# Patient Record
Sex: FEMALE | Race: BLACK OR AFRICAN AMERICAN | NOT HISPANIC OR LATINO | Employment: OTHER | URBAN - METROPOLITAN AREA
[De-identification: names, ages, dates, MRNs, and addresses within clinical notes are randomized per-mention and may not be internally consistent; named-entity substitution may affect disease eponyms.]

---

## 2017-01-05 ENCOUNTER — ALLSCRIPTS OFFICE VISIT (OUTPATIENT)
Dept: OTHER | Facility: OTHER | Age: 57
End: 2017-01-05

## 2017-02-24 ENCOUNTER — ALLSCRIPTS OFFICE VISIT (OUTPATIENT)
Dept: OTHER | Facility: OTHER | Age: 57
End: 2017-02-24

## 2017-03-01 ENCOUNTER — GENERIC CONVERSION - ENCOUNTER (OUTPATIENT)
Dept: OTHER | Facility: OTHER | Age: 57
End: 2017-03-01

## 2017-03-15 ENCOUNTER — GENERIC CONVERSION - ENCOUNTER (OUTPATIENT)
Dept: OTHER | Facility: OTHER | Age: 57
End: 2017-03-15

## 2017-03-29 ENCOUNTER — ALLSCRIPTS OFFICE VISIT (OUTPATIENT)
Dept: OTHER | Facility: OTHER | Age: 57
End: 2017-03-29

## 2017-04-10 ENCOUNTER — ALLSCRIPTS OFFICE VISIT (OUTPATIENT)
Dept: OTHER | Facility: OTHER | Age: 57
End: 2017-04-10

## 2017-04-12 ENCOUNTER — GENERIC CONVERSION - ENCOUNTER (OUTPATIENT)
Dept: OTHER | Facility: OTHER | Age: 57
End: 2017-04-12

## 2017-04-12 ENCOUNTER — ALLSCRIPTS OFFICE VISIT (OUTPATIENT)
Dept: OTHER | Facility: OTHER | Age: 57
End: 2017-04-12

## 2017-05-10 ENCOUNTER — ALLSCRIPTS OFFICE VISIT (OUTPATIENT)
Dept: OTHER | Facility: OTHER | Age: 57
End: 2017-05-10

## 2017-05-17 ENCOUNTER — ALLSCRIPTS OFFICE VISIT (OUTPATIENT)
Dept: OTHER | Facility: OTHER | Age: 57
End: 2017-05-17

## 2017-06-01 ENCOUNTER — GENERIC CONVERSION - ENCOUNTER (OUTPATIENT)
Dept: OTHER | Facility: OTHER | Age: 57
End: 2017-06-01

## 2017-06-14 ENCOUNTER — ALLSCRIPTS OFFICE VISIT (OUTPATIENT)
Dept: OTHER | Facility: OTHER | Age: 57
End: 2017-06-14

## 2017-06-28 ENCOUNTER — ALLSCRIPTS OFFICE VISIT (OUTPATIENT)
Dept: OTHER | Facility: OTHER | Age: 57
End: 2017-06-28

## 2017-08-03 ENCOUNTER — ALLSCRIPTS OFFICE VISIT (OUTPATIENT)
Dept: OTHER | Facility: OTHER | Age: 57
End: 2017-08-03

## 2017-08-17 ENCOUNTER — ALLSCRIPTS OFFICE VISIT (OUTPATIENT)
Dept: OTHER | Facility: OTHER | Age: 57
End: 2017-08-17

## 2017-08-30 ENCOUNTER — ALLSCRIPTS OFFICE VISIT (OUTPATIENT)
Dept: OTHER | Facility: OTHER | Age: 57
End: 2017-08-30

## 2017-08-31 ENCOUNTER — ALLSCRIPTS OFFICE VISIT (OUTPATIENT)
Dept: OTHER | Facility: OTHER | Age: 57
End: 2017-08-31

## 2017-10-19 ENCOUNTER — ALLSCRIPTS OFFICE VISIT (OUTPATIENT)
Dept: OTHER | Facility: OTHER | Age: 57
End: 2017-10-19

## 2017-11-01 ENCOUNTER — GENERIC CONVERSION - ENCOUNTER (OUTPATIENT)
Dept: OTHER | Facility: OTHER | Age: 57
End: 2017-11-01

## 2017-11-15 ENCOUNTER — ALLSCRIPTS OFFICE VISIT (OUTPATIENT)
Dept: OTHER | Facility: OTHER | Age: 57
End: 2017-11-15

## 2017-11-17 ENCOUNTER — ALLSCRIPTS OFFICE VISIT (OUTPATIENT)
Dept: OTHER | Facility: OTHER | Age: 57
End: 2017-11-17

## 2017-11-29 ENCOUNTER — ALLSCRIPTS OFFICE VISIT (OUTPATIENT)
Dept: OTHER | Facility: OTHER | Age: 57
End: 2017-11-29

## 2018-01-09 NOTE — PSYCH
History of Present Illness  [facility] Progress Note [facility]:   Specialized Services Documentation - Therapist must complete separate progress note for each specific clinical activity in which the client participated during the day  (230) Group Psychotherapy: ([id number]) Antolin Bay participated only during the introductory phase of psychotherapy group focused on recovery  She sat quietly with poor eye contact and head bowed  No progress towards goal  Continue group to offer opportunity to relate to peers around similar issues  Treatment Plan Problem(s): 1 1  Latoya Llanes LCSW     (318) Group Psychotherapy: [id number] Antolin Bay participated minimally in wellness group focused on assessment of weekly goal work in each area of functioning; including physical, emotional, cognitive, social and spiritual  Pt sat slumped down in chair with hat on and had poor eye contact with peers  Antolin Bay briefly shared that she will work on emotional area of functioning next week but did not want to discuss objectives to this goal  Pt made minimal progress toward goal  Continue group to educate and encourage patient to identify areas of functioning requiring ongoing attention for healthier functioning  Treatment Plan Problem(s): 1 1  Prisca Romero RN       (535) Education Therapy Goals set - exercise    Treatment Plan Problem(s): 1 1  Education Therapy Time - 0900 - 0930, Time first treatment day Previous goal was met  Readiness to Learning:  She is receptive to learning  There are  no barriers to learning  Learning Assessment Time - 1330 - 1400   Education completed on  illness, medication and wellness tools  The teaching method was  verbal and written  Shared area of learning: Yes  ALEYDA Grace     (163) Allied Therapy [id number] was excused related to case management evaluation   ALEYDA Grace     ( ) Other Case Management/UR Per Tee Joseph of shun VALENTIN, 12 PHP days approved with LCD 5/2/16  [facility]  # [phone]  Reviewer: Gray Ashley [phone] Anival Meza  [id number]  Treatment Plan Problem(s): N/A  Alberto Robles LCSW           Active Problems     1  Abnormal CBC (790 6) (R79 89)   2  Allergic rhinitis (477 9) (J30 9)   3  Asthma (493 90) (J45 909)   4  Asymptomatic menopausal state (V49 81) (Z78 0)   5  Breast cancer screening (V76 10) (Z12 39)   6  Bronchospasm (519 11) (J98 01)   7  Eczema (692 9) (L30 9)   8  Encounter for screening mammogram for malignant neoplasm of breast (V76 12)   (Z12 31)   9  Fatigue (780 79) (R53 83)   10  History of allergy (V15 09) (Z88 9)   11  Hyperlipidemia (272 4) (E78 5)   12  Laboratory examination ordered as part of a routine general medical examination    (V72 62) (Z00 00)   13  Major depressive disorder, recurrent, moderate (296 32) (F33 1)   14  Need for hepatitis B vaccination (V05 3) (Z23)   15  Need for prophylactic vaccination and inoculation against bacterial diseases (V03 9)    (Z23)   16  Need for prophylactic vaccination and inoculation against influenza (V04 81) (Z23)   17  Positive PPD (795 51) (R76 11)   18  Post-menopausal (V49 81) (Z78 0)   19  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)   20  Screening examination for pulmonary tuberculosis (V74 1) (Z11 1)   21  Vitamin D deficiency (268 9) (E55 9)    Severe recurrent major depression without psychotic features (296 33) (F33 2)       JOSE ROBERTO (generalized anxiety disorder) (300 02) (F41 1)          Past Medical History    1  History of A Fall (E888 9)   2  History of Acute upper respiratory infection (465 9) (J06 9)   3  History of Atelectasis (518 0) (J98 11)   4  History of Contusion Of The Chest Wall With Intact Skin Surface (922 1)   5  History of acute bronchitis (V12 69) (Z87 09)   6  History of acute bronchitis (V12 69) (Z87 09)   7  History of fatigue (V13 89) (Z87 898)   8  Denied: History of head injury   9  History of low back pain (V13 59) (Z87 39)   10   History of low back pain (V13 59) (Z87 39)   11  History of shortness of breath (V13 89) (Z87 898)   12  History of Joint pain, knee (719 46) (M25 569)   13  Need for hepatitis B vaccination (V05 3) (Z23)   14  Need for prophylactic vaccination and inoculation against bacterial diseases (V03 9)    (Z23)   15  Need for prophylactic vaccination and inoculation against influenza (V04 81) (Z23)   16  History of Palpitations (785 1) (R00 2)   17  History of 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)   18  Denied: History of Seizures   19  History of Subconjunctival hemorrhage, unspecified laterality (372 72) (H11 30)    Allergies    1  No Known Drug Allergies    Current Meds   1  Advair Diskus 250-50 MCG/DOSE Inhalation Aerosol Powder Breath Activated; 1 PUFF   TWICE DAILY; RINSE MOUTH AFTER USE AS NEEDED; Therapy: 43PIQ0008 to (Evaluate:54Mup6015)  Requested for: 21Apr2015; Last   Rx:21Apr2015 Ordered   2  ALPRAZolam 0 5 MG Oral Tablet; Take 1 tablet 3 times daily as needed; Therapy: 39ANI1615 to (Evaluate:59Bur4320)  Requested for: 12Apr2016; Last   Rx:02Mar2016; Status: ACTIVE - Renewal Denied Ordered   3  Calcium 1250 MG TABS; TAKE 1 TABLET DAILY; Therapy: 47VMJ3285 to Recorded   4  Fluticasone Propionate 50 MCG/ACT Nasal Suspension; USE 2 SPRAYS IN EACH   NOSTRIL ONCE DAILY; Therapy: 44HWT6475 to (Arlon Babinski)  Requested for: 81VXH5268; Last   Rx:12May2015 Ordered   5  Multi-Vitamins Oral Tablet; TAKE 1 TABLET DAILY; Therapy: 02OAZ9245 to Recorded   6  PARoxetine HCl - 30 MG Oral Tablet; TAKE 1 TABLET IN THE EVENING; Therapy: 51RCB0368 to (Evaluate:30Pwp3866)  Requested for: 88DTD1742; Last   Rx:02Mar2016 Ordered   7  ProAir  (90 Base) MCG/ACT Inhalation Aerosol Solution; INHALE 2 PUFFS   EVERY 4 HOURS AS NEEDED FOR COUGH AND WHEEZE;   Therapy: 85IUP3353 to (Evaluate:70Gcu3885)  Requested for: 21Apr2015; Last   Rx:21Apr2015 Ordered   8   Verapamil HCl  MG Oral Capsule Extended Release 24 Hour; TAKE 1 CAPSULE   Weekly; Therapy: 29Gqo4863 to (Evaluate:23Jun2015) Recorded   9  Vitamin C 1000 MG Oral Tablet; TAKE 1 TABLET DAILY; Therapy: 06DOY4417 to Recorded   10  Vitamin D3 2000 UNIT Oral Capsule; TAKE 1 CAPSULE Daily; Therapy: 63QTH9178 to (Last Rx:25Oct2013) Ordered    Family Psych History  Mother    1  No family history of mental disorder  Father    2  No family history of mental disorder  Grandmother    3  Family history of depression (V17 0) (Z81 8)  Family History    4  Family history of Hodgkin Disease   5  Family history of Hypothyroidism   6  Family history of Osteoporosis (V17 81)   7  Family history of Stroke Syndrome (V17 1)    Social History    · Being A Social Drinker   · Marital History - Currently    · Never A Smoker   · No drug use   · Denied: History of Tobacco Use   · Travel / Residence In [place]   · Denied: History of Using Intravenous Drugs   · Work History    Future Appointments    Date/Time Provider Specialty Site   04/18/2016 12:15 PM SHANTE Patel  Psychiatry [facility]   04/19/2016 11:45 AM SHANTE Patel  Psychiatry [facility]   04/20/2016 11:45 AM SHANTE Patel  [facility]iatry [facility]   04/21/2016 11:45 AM SHANTE Patel  [facility]iatry [facility]   04/22/2016 11:45 AM SHANTE Patel  [facility]iatry [facility]   06/29/2016 02:15 PM SHANTE Bowman  [facility]iatry [facility]   05/05/2016 11:15 AM Emily Cabrera MS, APRN, PMHCNS_BS  [facility]     Signatures   Electronically signed by : ALEYDA Che;  Apr 15 2016  2:18PM EST                       (Author)    Electronically signed by : Darlene Kam LCSW; Apr 15 2016  3:27PM EST                       (Author)    Electronically signed by : Veronica Chong RN; Apr 18 2016  3:08PM EST                       (Author)

## 2018-01-09 NOTE — PSYCH
Progress Note  Psychotherapy Provided [facility]: Individual Psychotherapy 45 minutes provided today  Goals addressed in session:   Goal #1  D: "It's a 'down day',and I can't seem to get organized   "   Pt Shelbi Branham) has a depressed, low-energy affect  Depressed mood  Low motivation, and she acknowledges that her home is 'disorganized ' Pt denies BRENNAN Samuels  PHQ9=14  Her sleep is in a hypersomnia-pattern---she sleeps "to escape " Appetite is good,and pt is trying to sit-on her eating and to lose a few lbs  Pt processes her stressors,and her thoughts, feelings,and behaviors  She is angry at herself for having such a clutttered/ disorganized house, with "paperwork everywhere", taxes not done (even from years ago), and she lost an important large envelope of paperwork to do, for Francesco's [address]---" I received it in the mail,and I don't know what I did with it " She has slight memory lapses, at times, with her depression  Pt also identifies an over-riding constant stressor---Francesco, 14, suffer from severe Autism,and he's in [employer] this year, but pt says , " I don't have any Plans together for him for when he DOES graduate High School, like where could he possibly go? What could he do?"    and "He's too big for me, to wash in the shower, and to teach him how to urinate properly without pulling his whole pants down at the urinal, when we go places with him---like when we were in [place] with him recently---it was so embarrassing when whole boat- crew went in the [facility] and saw him that way!" Pt does some Cognitive-reframing,and problem-solving, in session today  She will enlist the help of her  Jh Mcnally ,to teach Betty Arbela his toileting regimen and hand-washing afterward  She is advised to join the [employer] Group for parents of Autistic Children ,and to give political input to her ,as a Group, re: Autistic Care Residences for young adults, for post-High School years   (She's already been given the numbers for Support Group/ and National Association, in the past ) Pt to give self-affirmation, for her personal strengths, accomplishments in her life,and for keeping her family together  She has Francesco in [employer] past [employer], plus other Seasonal Sports with the [employer], at times  Active Listening,Support,and Validation given  Today ,her goal is that she will get a NEW packet from Francesco's school,and fill it out right away,and submit it  Then , organize her piles of paperwork at home, clear the decks---put important documents in a file cabinet---she hasn't done that yet  She did start Exercise,each week,and went this am--->affirmation given  She continues meds, no side effects except some weight  She is thankful for some Financial relief from [employer], a positive in her current life  A: Major Depressive Disorder, recurrent, severe, F33 2; Generalized Anxiety Disorder, F41 10  Stress re: being the parent of an adopted Severely Autistic Son,and total uncertainty about his future  P: Continue Treatment Plan, Meds, and Psychotherapy  Pt to organize herself,and get her paperwork either done, or filed, or discarded if appropriate! Pain Scale and Suicide Risk [facility]: Current Pain Assessment: moderate to severe   On a scale of 0 to 10, the patient rates current pain at 1   Current suicide risk is low   Behavioral Health Treatment Plan Jaylan Singh: Diagnosis and Treatment Plan explained to patient, patient relates understanding diagnosis and is agreeable to Treatment Plan  Results/Data  PHQ-9 Adult Depression Screening [id number] 10:56AM Jesus Bean     Test Name Result Flag Reference   PHQ-9 Adult Depression Score 14     Over the last two weeks, how often have you been bothered by any of the following problems?   Little interest or pleasure in doing things: Several days - 1  Feeling down, depressed, or hopeless: More than half the days - 2  Trouble falling or staying asleep, or sleeping too much: More than half the days - 2  Feeling tired or having little energy: Nearly every day - 3  Poor appetite or over eating: Several days - 1  Feeling bad about yourself - or that you are a failure or have let yourself or your family down: More than half the days - 2  Trouble concentrating on things, such as reading the newspaper or watching television: More than half the days - 2  Moving or speaking so slowly that other people could have noticed  Or the opposite -  being so fidgety or restless that you have been moving around a lot more than usual: Several days - 1  Thoughts that you would be better off dead, or of hurting yourself in some way: Not at all - 0   PHQ-9 Adult Depression Screening Positive     PHQ-9 Difficulty Level Very difficult     PHQ-9 Severity Moderate Depression         Assessment    1  Major depressive disorder, recurrent severe without psychotic features (296 33) (F33 2)   2  JOSE ROBERTO (generalized anxiety disorder) (300 02) (F41 1)   3   Marital conflict (F32 00) (M62 6)    Signatures   Electronically signed by : [facility] , MSAPRNPMHCNS-BC; Aug 31 2017 12:51PM EST                       (Author)    Electronically signed by : [facility] , MSAPRNPCNS-BC; Aug 31 2017 12:51PM EST                       (Author)

## 2018-01-10 NOTE — PSYCH
Treatment Plan Tracking    #1 Treatment Plan not completed within required time limits due to: Client presented with emotional/behavioral issues that required clinical intervention            Signatures   Electronically signed by : Junie Vásquez MSAPRNPMHCNARIADNE; Mar 29 2017  2:25PM EST                       (Author)

## 2018-01-10 NOTE — PSYCH
Progress Note  Psychotherapy Provided [facility]: Individual Psychotherapy 50 minutes provided today  Goals addressed in session:   Goal #1  D: " I have been very, very depressed this week  Elin King I'm worried    my 's On-Strike with his co-workers at [address] will only pay them $200 after striking a full 2 weeks---we will never get by on that " (Crying)   " I have almost no energy, no motivation, I didn't even do the things that our  needed  '   "I can't think straight",  " I feel hopeless "'   " I admit that yesterday, for the first time, I actually had suicidal thoughts and I wanted to take extra pills"    " I didn't take them , though"   " I did talk to Dr Paresh Reece, and I did talk to the Crisis worker that the [place] sent out,and I don't think I need to go to the hospital'"   " I'm taking care of Josué Petties, I had a meeting at his school yesterday    and we just deal with his Autism one day at a time    but THAT is so depressing! "  Elin King " I promise I will not hurt myself "   " I WILL go to [facility], as you are saying today " (Crying)    Pt has a depressed, hopeless mood  Pt has an anxious, low-energy , tearful affect,and poor eye-contact, until two-thirds of the session is over  Pt had SI yesterday,and [facility] interventions were done, as directed by Dr Paresh Reece  A Crisis Worker "Berna" ([facility]) also spoke with this Therapist this morning,and she confirmed that pt made a Verbal Safety Contract yesterday, when Patrice Pino spoke with pt at her home for an hour  Pt denies an active suicidal plan or intent today,and if the SI returns , pt is continuing her Verbal Safety Contract  Pt thinks of her ,and her autistic adopted son Francesco---who depends on pt a great deal, for functioning in this life   Pt is aware of emergency Resources,also,and she agrees to go to the nearest Emergency Room if she ever gets persistent SI---pt is keeping communications open with her  "Enrrique Cary", also  Pt processes the recent precipitating events---especially dire financial situation, as now the sole Provider in their family, Enrrique Cary, has been placed out On- Strike at [employer] they have been working without a contract since August of 2015,and pt feels that the workers are being "used" and hurt by [facility], working excessive overtime, having limited workers to do the expanding work, and pt worries about her 's health, too---"how long can he be under this stress, with no income from me?", pt states  Pt has limited energy and concentration and stamina in recent months,and she is not able to hold down a job outside the home at this time  Pt's energy she does muster, goes toward the care and educational /services coordination, of their adopted severely autistic son "Samuel Orozco", who is almost a teen  Pt is given listening, support,and validation as a person  This Therapist offers pt treatment at [facility], here at [facility]  Pt agrees to participate in the program,and I fill out the extensive Referral form---> Form is given to NAVEED Ballard,  for ALICE Garcia they do have openings  (A copy of the Referral to [facility] is also in the "Scanning" bin, to be entered in pt's electronic chart ) Dr Alycia Cleary is aware  Pt also expresses much worry and hopelessness re: the fact that her Social Security  Marian Madden  ) just announced his long-term   pt's claim was Denied already by Social Security,and pt was in the process of Appealing it  Today, pt signed another updated Release of Information,and we called Saud Adhikari's office: [phone]  The 's  stated that Bushra Rodriguez will go forward with pt's Appeal process,and his office will get back to pt,after his current Vacation plans  Pt thanks this Therapist for making the call to Saud Adhikari,and she has a smidgeon of Hope, at the end of session   We review calm, deep-breathing,and review some of her strengths and blessings-- pt regularly attends Religion---her Antonia Mock is important to her and to her &son  Pt continues her medications ,and will only take the doses of Paxil and Xanax as prescribed by Dr Vivien Lundborg, she states  A: Major Depressive Disorder, recurrent, severe, F33 2, no psychosis,and no current SI / plan today, but pt has decompensated; Generalized Anxiety Disorder, F41 1; Severe Financial Stress, and Family stressors while raising severely autistic son  P: Pt is referred to [facility],and she agrees to attend  Continue Verbal Safety Contract,and pt to use as positive of Coping strategies as possible  She also is aware of Emergency Resources if ever needed  Pt to resume Outpatient Psychotherapy with this Therapist after the [facility] program is completed  Pain Scale and Suicide Risk [facility]: Current Pain Assessment: moderate to severe   On a scale of 0 to 10, the patient rates current pain at 2   Current suicide risk is low   Behavioral Health Treatment Plan [place]: Diagnosis and Treatment Plan explained to patient, patient relates understanding diagnosis and is agreeable to Treatment Plan  Assessment    1  Severe recurrent major depression without psychotic features (296 33) (F33 2)   2  JOSE ROBERTO (generalized anxiety disorder) (300 02) (F41 1)    Signatures   Electronically signed by : ELISABET Jarrett; Apr 14 2016  9:27PM EST                       (Author)    Electronically signed by : ELISABET Jarrett;  Apr 14 2016  9:28PM EST                       (Author)

## 2018-01-10 NOTE — PSYCH
Psych Med Mgmt    Appearance: was calm and cooperative, adequate hygiene and grooming and good eye contact  Observed mood: depressed and anxious  Observed mood: affect was constricted  Speech: speech soft  Thought processes: coherent/organized  Hallucinations: no hallucinations present  Thought Content: no delusions  Abnormal Thoughts: The patient has no suicidal thoughts and no homicidal thoughts  Orientation: The patient is oriented to person, place and time  Recent and Remote Memory: short term memory intact and long term memory intact  Attention Span And Concentration: concentration impaired  Insight: Insight intact  Judgment: Her judgment was intact  Muscle Strength And Tone  Muscle strength and tone were normal  Normal gait and station  Language: no difficulty naming common objects  Fund of knowledge: Patient displays adequate knowledge of current events, adequate fund of knowledge regarding past history and adequate fund of knowledge regarding vocabulary  The patient is experiencing no localized pain  Treatment Recommendations: Continue Paxil 30 mg per day and switch to evening due to tiredness  Does not want dose increase  Continue Xanax 0 5 mg tid PRN  Therapy with Niyah Bertrand  Risks, Benefits And Possible Side Effects Of Medications: Risks, benefits, and possible side effects of medications explained to patient and patient verbalizes understanding  She reports normal appetite, decreased energy, no weight change and normal number of sleep hours  Patient states she still has been experiencing on and off anxiety on and off "every day is different"  Still some depression "it is about the same'  No suicidality or homicidality  No psychotic symptoms  Reports some tiredness, no other side effects from medications reported          Vitals  Signs [Data Includes: Current Encounter]   Recorded: [id number] 03:48PM   Heart Rate: 74  Systolic: 978  Diastolic: 82  BP Cuff Size: Large  Height: 5 ft 3 in  Weight: 166 lb   BMI Calculated: 29 41  BSA Calculated: 1 79    Assessment    1  JOSE ROBERTO (generalized anxiety disorder) (300 02) (F41 1)   2  Major depressive disorder, recurrent, moderate (296 32) (F33 1)    Plan    1  ALPRAZolam 0 5 MG Oral Tablet (Xanax); Take 1 tablet 3 times daily as needed    2  From  PARoxetine HCl - 30 MG Oral Tablet TAKE 1 TABLET DAILY To   PARoxetine HCl - 30 MG Oral Tablet TAKE 1 TABLET IN THE EVENING   3  Follow-up visit in 3 months Evaluation and Treatment  Follow-up  Status: Hold For -   Scheduling  Requested for: 96ZWS2319    Review of Systems    Constitutional: feeling tired  Cardiovascular: no complaints of slow or fast heart rate, no chest pain, no palpitations  Respiratory: no complaints of shortness of breath, no wheezing, no dyspnea on exertion  Gastrointestinal: no complaints of abdominal pain, no constipation, no nausea, no diarrhea, no vomiting  Genitourinary: no complaints of dysuria, no incontinence, no pelvic pain, no urinary frequency  Musculoskeletal: no complaints of arthralgia, no myalgias, no limb pain, no joint stiffness  Integumentary: no complaints of skin rash, no itching, no dry skin  Neurological: no complaints of headache, no confusion, no numbness, no dizziness  Other Symptoms: Nasal congestion  Past Psychiatric History    Past Psychiatric History: No prior history of inpatient psychiatric treatment  Attended Partial Program in past one time  No history of past suicide attempts  Active Problems    1  Abnormal CBC (790 6) (R79 89)   2  Allergic rhinitis (477 9) (J30 9)   3  Asthma (493 90) (J45 909)   4  Asymptomatic menopausal state (V49 81) (Z78 0)   5  Breast cancer screening (V76 10) (Z12 39)   6  Bronchospasm (519 11) (J98 01)   7  Eczema (692 9) (L30 9)   8  Encounter for screening mammogram for malignant neoplasm of breast (V76 12)   (Z12 31)   9  Fatigue (780 79) (R53 83)   10   JOSE ROBERTO (generalized anxiety disorder) (300 02) (F41 1)   11  History of allergy (V15 09) (Z88 9)   12  Hyperlipidemia (272 4) (E78 5)   13  Laboratory examination ordered as part of a routine general medical examination    (V72 62) (Z00 00)   14  Need for hepatitis B vaccination (V05 3) (Z23)   15  Need for prophylactic vaccination and inoculation against bacterial diseases (V03 9)    (Z23)   16  Need for prophylactic vaccination and inoculation against influenza (V04 81) (Z23)   17  Positive PPD (795 51) (R76 11)   18  Post-menopausal (V49 81) (Z78 0)   19  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)   20  Screening examination for pulmonary tuberculosis (V74 1) (Z11 1)   21  Severe recurrent major depression without psychotic features (296 33) (F33 2)   22  Vitamin D deficiency (268 9) (E55 9)    Past Medical History    1  History of A Fall (E888 9)   2  History of Acute upper respiratory infection (465 9) (J06 9)   3  History of Atelectasis (518 0) (J98 11)   4  History of Contusion Of The Chest Wall With Intact Skin Surface (922 1)   5  History of acute bronchitis (V12 69) (Z87 09)   6  History of acute bronchitis (V12 69) (Z87 09)   7  History of fatigue (V13 89) (Z87 898)   8  Denied: History of head injury   9  History of low back pain (V13 59) (Z87 39)   10  History of low back pain (V13 59) (Z87 39)   11  History of shortness of breath (V13 89) (Z87 898)   12  History of Joint pain, knee (719 46) (M25 569)   13  Need for hepatitis B vaccination (V05 3) (Z23)   14  Need for prophylactic vaccination and inoculation against bacterial diseases (V03 9)    (Z23)   15  Need for prophylactic vaccination and inoculation against influenza (V04 81) (Z23)   16  History of Palpitations (785 1) (R00 2)   17  History of 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)   18  Denied: History of Seizures   19   History of Subconjunctival hemorrhage, unspecified laterality (372 72) (H11 30)    The active problems and past medical history were reviewed and updated today  Allergies    1  No Known Drug Allergies    Current Meds   1  Advair Diskus 250-50 MCG/DOSE Inhalation Aerosol Powder Breath Activated; 1 PUFF   TWICE DAILY; RINSE MOUTH AFTER USE AS NEEDED; Therapy: 34TXO7288 to (Evaluate:18Sep2015)  Requested for: 21Apr2015; Last   Rx:21Apr2015 Ordered   2  ALPRAZolam 0 5 MG Oral Tablet; Take 1 tablet 3 times daily as needed; Therapy: 65DRF4771 to (Evaluate:12Mar2016)  Requested for: 54XHO1510; Last   Rx:45Fee8332; Status: ACTIVE - Renewal Denied Ordered   3  Calcium 1250 MG TABS; TAKE 1 TABLET DAILY; Therapy: 93XDW7067 to Recorded   4  Fluticasone Propionate 50 MCG/ACT Nasal Suspension; USE 2 SPRAYS IN EACH   NOSTRIL ONCE DAILY; Therapy: 42YWJ4730 to (Billy Miguel)  Requested for: 79ARB4982; Last   Rx:56Dpv8849 Ordered   5  Multi-Vitamins Oral Tablet; TAKE 1 TABLET DAILY; Therapy: 01EQW4386 to Recorded   6  PARoxetine HCl - 30 MG Oral Tablet; TAKE 1 TABLET DAILY; Therapy: 82KWG1369 to (Evaluate:13Mar2016)  Requested for: 84Jqu6637; Last   Rx:12Ffq8021 Ordered   7  ProAir  (90 Base) MCG/ACT Inhalation Aerosol Solution; INHALE 2 PUFFS   EVERY 4 HOURS AS NEEDED FOR COUGH AND WHEEZE;   Therapy: 17QZS4080 to (Evaluate:14Efl6412)  Requested for: 21Apr2015; Last   Rx:21Apr2015 Ordered   8  Verapamil HCl  MG Oral Capsule Extended Release 24 Hour; TAKE 1 CAPSULE   Weekly; Therapy: 82Vbz5087 to (Evaluate:23Jun2015) Recorded   9  Vitamin C 1000 MG Oral Tablet; TAKE 1 TABLET DAILY; Therapy: 57AVX2179 to Recorded   10  Vitamin D3 2000 UNIT Oral Capsule; TAKE 1 CAPSULE Daily; Therapy: 87RSA9044 to (Last Rx:25Oct2013) Ordered    The medication list was reviewed and updated today  Family Psych History    1  No family history of mental disorder    2  No family history of mental disorder    3  Family history of depression (V17 0) (Z81 8)    4  Family history of Hodgkin Disease   5   Family history of Hypothyroidism   6  Family history of Osteoporosis (V17 81)   7  Family history of Stroke Syndrome (V17 1)    The family history was reviewed and updated today  Social History    · Being A Social Drinker   · Marital History - Currently    · Never A Smoker   · No drug use   · Denied: History of Tobacco Use   · Travel / Residence In [place]   · Denied: History of Using Intravenous Drugs   · Work History  The social history was reviewed and updated today  The social history was reviewed and is unchanged  , lives with  and 15year old adoptive son who is autistic  Unemployed, worked in office management  Applied for disability  Has bachelor's degree in communications  History Of Phys/Sex Abuse Or Perpetration    History Of Phys/Sex Abuse or Perpetration: No history of physical or sexual abuse  End of Encounter Meds    1  Advair Diskus 250-50 MCG/DOSE Inhalation Aerosol Powder Breath Activated; 1 PUFF   TWICE DAILY; RINSE MOUTH AFTER USE AS NEEDED; Therapy: 56TSI5764 to (Evaluate:17Uxe1407)  Requested for: 21Apr2015; Last   Rx:21Apr2015 Ordered   2  Fluticasone Propionate 50 MCG/ACT Nasal Suspension; USE 2 SPRAYS IN EACH   NOSTRIL ONCE DAILY; Therapy: 80EFX3936 to (Equilla Ora)  Requested for: 99HSW9868; Last   Rx:12May2015 Ordered    3  ProAir  (90 Base) MCG/ACT Inhalation Aerosol Solution; INHALE 2 PUFFS   EVERY 4 HOURS AS NEEDED FOR COUGH AND WHEEZE;   Therapy: 75XER4306 to (Evaluate:13Qny0474)  Requested for: 21Apr2015; Last   Rx:21Apr2015 Ordered    4  ALPRAZolam 0 5 MG Oral Tablet (Xanax); Take 1 tablet 3 times daily as needed; Therapy: 61HLL0186 to (Evaluate:51Rog5994)  Requested for: 49SWF3059; Last   Rx:02Mar2016 Ordered    5  PARoxetine HCl - 30 MG Oral Tablet; TAKE 1 TABLET IN THE EVENING; Therapy: 23QCT5330 to (Evaluate:13Ori0347)  Requested for: 38WEQ8534; Last   Rx:02Mar2016 Ordered    6  Calcium 1250 MG TABS; TAKE 1 TABLET DAILY;    Therapy: 70YLS5653 to Recorded   7  Multi-Vitamins Oral Tablet; TAKE 1 TABLET DAILY; Therapy: 10DZE9763 to Recorded   8  Vitamin C 1000 MG Oral Tablet; TAKE 1 TABLET DAILY; Therapy: 57JES6871 to Recorded    9  Verapamil HCl  MG Oral Capsule Extended Release 24 Hour; TAKE 1 CAPSULE   Weekly; Therapy: 21Frc6870 to (Evaluate:23Jun2015) Recorded    10  Vitamin D3 2000 UNIT Oral Capsule; TAKE 1 CAPSULE Daily;     Therapy: 74AEL7453 to (Last Rx:25Oct2013) Ordered    Future Appointments    Date/Time Provider Specialty Site   03/03/2016 10:15 AM Jasmine Trammell MS, SINTIA, PMHCNS_BS  [facility]   03/31/2016 11:15 AM Jasmine Trammell MS, APRN, PMHCNS_BS  [facility]   04/14/2016 11:15 AM Jasmine Trammell MS, APRN, PMHCNS_BS  [facility]   05/05/2016 11:15 AM Jasmine Trammell MS, APRN, PMHCNS_BS  [facility]     Signatures   Electronically signed by : SHANTE Brown ; Mar  2 2016  3:59PM EST                       (Author)

## 2018-01-10 NOTE — PSYCH
Progress Note  Psychotherapy Provided [facility]: Individual Psychotherapy 50 minutes provided today  Goals addressed in session:   Goal #1  D: ' I have low motivation to get anything done, I have to push, push myself "'   " I finally got our information to our ,and we'll see what happens about our Back-Taxes"      "Our finances are in shambles, since I haven't been able to work   and my  is out sick this week, recovering from surgery for Nasal Polyps removal---he couldn't breathe well,and now he CAN "   " But he was suspended for a week before his surgery, by [employer], because of like a miniscule time problem---he always goes to work,and he works overtime for them, but this is the thanks he gets!"   " He will go back to work when the surgeon releases him "   " We may have to foreclose on my Mommy's house, where my sister lives---we got an 's letter from [place] about this "   " And now , NO WORD from my [address] claim,and my  is retiring? Perfect timing, I can't stand it!" (Crying)   " Baldo Rich is still [place], and HE is most of my work, with his Autism   "   Pt has a depressed mood, low energy,low motivation,and appears to not have the stamina for a job right now  Denies Holly Mercado / Gustabo Gee / Raymond Christensen /   Affirmation is given, for getting her tax info to her   Pt processes her stressors,and her thoughts, feelings,and behaviors  Pt has severe financial stress---I give her Information that I received today in a general letter to patients, re; her  Jennifer Corona, is retiring and ,his practice is sold to an  Gretchen---pt to call the new  ASAP, to see if any hearing can be expedited re: her case  Pt is assisted with deep-breathing,and cognitive reframing---all is not bleak, pt and  just have to continue problem-solving, one step at a time   Pt continues meds,and continues daily care and support of their severely autistic adopted son Baldo Rich  Listening, support,and validation also given  A: Major depressive disorder, recurrent ,severe, F33 2; Generalized Anxiety Disorder, F41 1, severe family stressors and Financial stressors  P: Continue Treatment Plan, Meds,and Psychotherapy  Pt to make an joe't ASAP with the new  (Christopher Gudino) who bought her 's, [employer], [employer] Practice, and see if her case can possibly be expedited for a Hearing, due to dire financial straits  Pain Scale and Suicide Risk [facility]: Current Pain Assessment: moderate to severe   On a scale of 0 to 10, the patient rates current pain at 1   Current suicide risk is low   Behavioral Health Treatment Plan [facility]: Diagnosis and Treatment Plan explained to patient, patient relates understanding diagnosis and is agreeable to Treatment Plan  Assessment    1  JOSE ROBERTO (generalized anxiety disorder) (300 02) (F41 1)   2   Severe recurrent major depression without psychotic features (296 33) (F33 2)    Signatures   Electronically signed by : Johnson Eddy MSAPRNPMHCNS-BC; Mar 31 2016 10:00PM EST                       (Author)

## 2018-01-10 NOTE — PSYCH
Progress Note  Psychotherapy Provided [facility]: Individual Psychotherapy 50 minutes provided today  Goals addressed in session:   Goal #1 (See new Treatment Plan, completed today )  D: " I feel SPENT "  Adriana Skipper     " I don't know what's wrong with me"   " No energy, no motivation---I do push myself to get Francesco ready and off to school each school day, but I'm NOT organized with my housework,at all "   " Lewis Alanus is too much, for me   he's too big for me to bathe alone, I need Genaro's help---but Lawrencejordi Vickers has to work long hours to support us    I feel guilty, that I can't get it together to work and have a job    but I have no concentration or drive, to work the way I used to, years ago at [employer]"   " Ever since Francesco---I'm down, I'm spent "   Pt has a depressed,tearful affect and mood  Denies SI Herminia Thakkarer [address]  But she has hopeless thoughts many days, has low energy  low motivation, and her sleep is sporadic  Pt processes her thoughts, feelings,and behaviors  We attempt Cognitive-reframing---but pt is not amenable to changing her automatic- negative thoughts to more realistic or some positive thoughts  Pt does her Treatment Plan today  Pt to do one goal per day (see Tx  Plan),and then give self-affirmation  Pt says her Saleem Quinn helps her to [place],and they will go to [facility] this Easter Sunday  Pt to ask Lawrence Vickers for help showering Francesco/ helping with Francesco's Shelvy Sofie is in puberty, age 15 now, and stronger/ bigger than pt Kayleigh Crooks  Pt is preparing for Francesco's IEP meeting At his school April 20,and 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, if possible---all of this is a full time job for pt,and she feels "spent ' Pt thought that "Respite Care" for Debbie Wagner was more trouble than it was worth,so pt did not want to pursue it again  Pt has a Social Security Disability Hearing in [place]---she already signed Releases for her Psychiatric information to be shared with her  ,Richard To, [address]     A: Major Depressive Disorder , recurrent, severe, F33 2; Generalized Anxiety Disorder, F41 10; Severe Stress related to the care of her Adopted severely Autistic son Shanique Askew   P: Continue new Treatment Plan, meds, and Psychotherapy  Pain Scale and Suicide Risk [facility]: Current Pain Assessment: moderate to severe   On a scale of 0 to 10, the patient rates current pain at 1   Current suicide risk is low   Behavioral Health Treatment Plan [facility]: Diagnosis and Treatment Plan explained to patient, patient relates understanding diagnosis and is agreeable to Treatment Plan  Assessment    1  Major depressive disorder, recurrent severe without psychotic features (296 33) (F33 2)   2  JOSE ROBERTO (generalized anxiety disorder) (300 02) (F41 1)   3  Marital conflict (J02 02) (Z42 5)    Signatures   Electronically signed by : NATALY Aragon-BC; Apr 12 2017  7:57PM EST                       (Author)    Electronically signed by : NATALY Aragon-BC;  Apr 12 2017  7:57PM EST                       (Author)

## 2018-01-11 NOTE — MISCELLANEOUS
Message  Pt was NO SHOW for today's 11:15am Psychotherapy joe't    She did not call  This Therapist called her and left a phone message re: the No Show, and that I hope she is doing all right today,and to please call and reschedule joe't  at our [facility]  office [phone] ---X  Deborah, MS, APRN, PMHCNS      Active Problems    1  Abnormal CBC (790 6) (R79 89)   2  Allergic rhinitis (477 9) (J30 9)   3  Asthma (493 90) (J45 909)   4  Asymptomatic menopausal state (V49 81) (Z78 0)   5  Breast cancer screening (V76 10) (Z12 39)   6  Bronchospasm (519 11) (J98 01)   7  Eczema (692 9) (L30 9)   8  Encounter for screening mammogram for malignant neoplasm of breast (V76 12)   (Z12 31)   9  Fatigue (780 79) (R53 83)   10  JOSE ROBERTO (generalized anxiety disorder) (300 02) (F41 1)   11  History of allergy (V15 09) (Z88 9)   12  Hyperlipidemia (272 4) (E78 5)   13  Laboratory examination ordered as part of a routine general medical examination    (V72 62) (Z00 00)   14  Major depressive disorder, recurrent severe without psychotic features (296 33) (F33 2)   15  Need for hepatitis B vaccination (V05 3) (Z23)   16  Need for prophylactic vaccination and inoculation against bacterial diseases (V03 9)    (Z23)   17  Need for prophylactic vaccination and inoculation against influenza (V04 81) (Z23)   18  Positive PPD (795 51) (R76 11)   19  Post-menopausal (V49 81) (Z78 0)   20  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)   21  Screening examination for pulmonary tuberculosis (V74 1) (Z11 1)   22  Vitamin D deficiency (268 9) (E55 9)    Current Meds   1  Advair Diskus 250-50 MCG/DOSE Inhalation Aerosol Powder Breath Activated; 1 PUFF   TWICE DAILY; RINSE MOUTH AFTER USE AS NEEDED; Therapy: 08VXG6972 to (Evaluate:49Yhb0281)  Requested for: 21Apr2015; Last   Rx:21Apr2015 Ordered   2  ALPRAZolam 0 5 MG Oral Tablet (Xanax); Take 1 tablet 3 times daily as needed;    Therapy: 57ZWE7958 to (Evaluate:06Mar2017)  Requested for: 20FYV8874; Last   Rx:99Ptp2396; Status: ACTIVE - Renewal Denied Ordered   3  BuPROPion HCl ER (SR) 100 MG Oral Tablet Extended Release 12 Hour; Take 1 tablet   daily; Therapy: 78Wuz4152 to (Evaluate:52Xcs1145)  Requested for: 79Acb6917; Last   Rx:15Apr2016 Ordered   4  BuPROPion HCl ER (XL) 300 MG Oral Tablet Extended Release 24 Hour; TAKE 1   TABLET DAILY; Therapy: 55PLP2522 to (Evaluate:04Jan2017)  Requested for: 39VIB5624; Last   Rx:06Oct2016 Ordered   5  Calcium 1250 MG TABS; TAKE 1 TABLET DAILY; Therapy: 96BRK4443 to Recorded   6  Fluticasone Propionate 50 MCG/ACT Nasal Suspension; USE 2 SPRAYS IN EACH   NOSTRIL ONCE DAILY; Therapy: 89ZDQ6296 to (Kaelyn Greene)  Requested for: 20EPY4864; Last   Rx:12May2015 Ordered   7  Multi-Vitamins Oral Tablet; TAKE 1 TABLET DAILY; Therapy: 25MBW4702 to Recorded   8  PARoxetine HCl - 30 MG Oral Tablet; TAKE 1 TABLET IN THE EVENING; Therapy: 42BIP1648 to (Desire Joel)  Requested for: 96IWI6427; Last   Rx:73Lvl9810 Ordered   9  ProAir  (90 Base) MCG/ACT Inhalation Aerosol Solution; INHALE 2 PUFFS   EVERY 4 HOURS AS NEEDED FOR COUGH AND WHEEZE;   Therapy: 93SKH0764 to (Evaluate:53Msl0391)  Requested for: 21Apr2015; Last   Rx:21Apr2015 Ordered   10  Verapamil HCl  MG Oral Capsule Extended Release 24 Hour; TAKE 1 CAPSULE    Weekly; Therapy: 27Vxi4212 to (Evaluate:23Jun2015) Recorded   11  Vitamin C 1000 MG Oral Tablet; TAKE 1 TABLET DAILY; Therapy: 38LJO1363 to Recorded   12  Vitamin D3 2000 UNIT Oral Capsule; TAKE 1 CAPSULE Daily; Therapy: 72ZDF1849 to (Last Rx:25Oct2013) Ordered    Allergies    1   No Known Drug Allergies    Signatures   Electronically signed by : TAMARA SoiranoMHCNS-BC; Oct 20 2016  2:54PM EST                       (Author)

## 2018-01-11 NOTE — PSYCH
Provider Comments  Provider Comments:   Pt was NO SHOW for 2:15pm appt  today, so this Therapist called her ---> pt answered the phone,and she is home today (with her severely Autistic son, and he has no school today due to [address] has Cancelled All Classes today), and pt said she could not get through on the phone- lines to CX today's Therapy appt  for herself  Pt was notified that she can leave a message with the Answering Service, to give 24hrs notice if possible for a Cancellation of her joe't    Support also given to her on the phone  Pt confirmed that she will try to come to the next scheduled joe't here, this month (March, 2017)  -SHANTE Cabrera MS, APRN, [address]      Signatures   Electronically signed by : Adria Mckeon MSAPRNPMHCNARIADNE; Mar 15 2017  2:56PM EST                       (Author)

## 2018-01-11 NOTE — PSYCH
History of Present Illness  [facility] Clinical Progress Note [facility]:   Specialized Services Documentation - Therapist must complete separate progress note for each specific clinical activity in which the client participated during the day  (392) Group Psychotherapy: ([phone]) Madiha Aguilar participated only during the introductory phase of psychotherapy group focused on family relationships  She seemed to be listening as peers shared despite not joining in any peer discussions  No progress towards goal  Continue group to offer opportunity to relate to peers around similar issues  Treatment Plan Problem(s): 1 1  Emmy Carrero LCSW     (433) Group Psychotherapy: 1230-130 Madiha Aguilar participated in wellness group focused on the [employer]  shared that she will become physically tired and feel drained when overwhelmed  Pt stated that she feels she can report S/S of physical as well as mental health symptoms to care givers  Peers discussed practicing relaxation breathing and Madiha Aguilar finds this helpful also to cope with anxiety  Pt made slow progress toward goal  Continue group to educate patient on healthy ways to cope with stressors in an effort to prevent emotional distress creating or exacerbating physical symptoms  Treatment Plan Problem(s): 1 1  Ping Washington RN       (523) Education Therapy Goals set - exercise    Treatment Plan Problem(s): 1 1  Education Therapy Time - 0900 - 0930 Previous goal was met  Readiness to Learning:  She is receptive to learning  There are  no barriers to learning  Learning Assessment Time - 1330 - 1400   Education completed on  illness and wellness tools  The teaching method was  verbal  Shared area of learning: Yes  ALEYDA Obrien     (427) Allied Therapy [id number] Madiha Aguilar actively shared in [facility] group focused on managing anger  She engaged in task exploring effective versus ineffective ways in addition to skills to refocus in the moment   She took notes and was attentive asking relevant questions related to decreasing intensity of emotion and learning productive releases of energy  Group explored the benefits of emotional control and positive choices with intense emotion  Patient encouraged to recognize ânormalcy of angerâ and ability to work with intense emotion  Some beginning effort toward goal noted  Continue AT to explore wellness tool awareness and practice  Treatment Plan Problem(s): 1 2  Lizabeth Cornejo, [facility]       Case Management Note:   ([id number]) June Harris reported a more productive day after program yesterday  She did not nap, repotted some plants, watched a TV show with her  and washed her hair  Because she started her hair care process too late last night, she did not get to sleep until 0100, but did sleep in her bed as opposed to the couch  She is aware she needs to go to bed earlier and get up earlier to avoid rushing around to get her son ready for school  This writer addressed her tardiness and discussed the importance of getting to program on time which is part of the protocol  She has not yet picked up the Wellbutrin from the pharmacy  She reported experiencing a better day in program today  TREATMENT SESSION NUMBER: 3   Current suicide risk is low  Medications not changed/added/denied  Shyla Nguyen LCSW      Active Problems    1  Abnormal CBC (790 6) (R79 89)   2  Allergic rhinitis (477 9) (J30 9)   3  Asthma (493 90) (J45 909)   4  Asymptomatic menopausal state (V49 81) (Z78 0)   5  Breast cancer screening (V76 10) (Z12 39)   6  Bronchospasm (519 11) (J98 01)   7  Eczema (692 9) (L30 9)   8  Encounter for screening mammogram for malignant neoplasm of breast (V76 12)   (Z12 31)   9  Fatigue (780 79) (R53 83)   10  JOSE ROBERTO (generalized anxiety disorder) (300 02) (F41 1)   11  History of allergy (V15 09) (Z88 9)   12  Hyperlipidemia (272 4) (E78 5)   13   Laboratory examination ordered as part of a routine general medical examination (V72 62) (Z00 00)   14  Major depressive disorder, recurrent, moderate (296 32) (F33 1)   15  Need for hepatitis B vaccination (V05 3) (Z23)   16  Need for prophylactic vaccination and inoculation against bacterial diseases (V03 9)    (Z23)   17  Need for prophylactic vaccination and inoculation against influenza (V04 81) (Z23)   18  Positive PPD (795 51) (R76 11)   19  Post-menopausal (V49 81) (Z78 0)   20  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)   21  Screening examination for pulmonary tuberculosis (V74 1) (Z11 1)   22  Severe recurrent major depression without psychotic features (296 33) (F33 2)   23  Vitamin D deficiency (268 9) (E55 9)    Past Medical History    1  History of A Fall (E888 9)   2  History of Acute upper respiratory infection (465 9) (J06 9)   3  History of Atelectasis (518 0) (J98 11)   4  History of Contusion Of The Chest Wall With Intact Skin Surface (922 1)   5  History of acute bronchitis (V12 69) (Z87 09)   6  History of acute bronchitis (V12 69) (Z87 09)   7  History of fatigue (V13 89) (Z87 898)   8  Denied: History of head injury   9  History of low back pain (V13 59) (Z87 39)   10  History of low back pain (V13 59) (Z87 39)   11  History of shortness of breath (V13 89) (Z87 898)   12  History of Joint pain, knee (719 46) (M25 569)   13  Need for hepatitis B vaccination (V05 3) (Z23)   14  Need for prophylactic vaccination and inoculation against bacterial diseases (V03 9)    (Z23)   15  Need for prophylactic vaccination and inoculation against influenza (V04 81) (Z23)   16  History of Palpitations (785 1) (R00 2)   17  History of 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)   18  Denied: History of Seizures   19  History of Subconjunctival hemorrhage, unspecified laterality (372 72) (H11 30)    Allergies    1  No Known Drug Allergies    Current Meds   1   Advair Diskus 250-50 MCG/DOSE Inhalation Aerosol Powder Breath Activated; 1 PUFF   TWICE DAILY; RINSE MOUTH AFTER USE AS NEEDED; Therapy: 54OWG0777 to (Evaluate:02Yew5110)  Requested for: 21Apr2015; Last   Rx:21Apr2015 Ordered   2  ALPRAZolam 0 5 MG Oral Tablet; Take 1 tablet 3 times daily as needed; Therapy: 31CRM4061 to (Evaluate:89Pwr6551)  Requested for: 12Apr2016; Last   Rx:02Mar2016; Status: ACTIVE - Renewal Denied Ordered   3  BuPROPion HCl ER (SR) 100 MG Oral Tablet Extended Release 12 Hour; Take 1 tablet   daily; Therapy: 15Apr2016 to (Evaluate:03Zgt3994)  Requested for: 15Apr2016; Last   Rx:15Apr2016 Ordered   4  Calcium 1250 MG TABS; TAKE 1 TABLET DAILY; Therapy: 25AZJ0405 to Recorded   5  Fluticasone Propionate 50 MCG/ACT Nasal Suspension; USE 2 SPRAYS IN EACH   NOSTRIL ONCE DAILY; Therapy: 77GVR9667 [facility] ([facility])  Requested for: 20NMK3070; Last   Rx:12May2015 Ordered   6  Multi-Vitamins Oral Tablet; TAKE 1 TABLET DAILY; Therapy: 37RSB8677 to Recorded   7  PARoxetine HCl - 30 MG Oral Tablet; TAKE 1 TABLET IN THE EVENING; Therapy: 61ILH9677 to (Evaluate:00Ilp3803)  Requested for: 51YOK2544; Last   Rx:02Mar2016 Ordered   8  ProAir  (90 Base) MCG/ACT Inhalation Aerosol Solution; INHALE 2 PUFFS   EVERY 4 HOURS AS NEEDED FOR COUGH AND WHEEZE;   Therapy: 02WEP1877 to (Evaluate:85Lhq1578)  Requested for: 21Apr2015; Last   Rx:21Apr2015 Ordered   9  Verapamil HCl  MG Oral Capsule Extended Release 24 Hour; TAKE 1 CAPSULE   Weekly; Therapy: 58Abc9625 to (Evaluate:23Jun2015) Recorded   10  Vitamin C 1000 MG Oral Tablet; TAKE 1 TABLET DAILY; Therapy: 39DAO4345 to Recorded   11  Vitamin D3 2000 UNIT Oral Capsule; TAKE 1 CAPSULE Daily; Therapy: 77NQI0804 to (Last Rx:25Oct2013) Ordered    Family Psych History  Mother    1  No family history of mental disorder  Father    2  No family history of mental disorder  Grandmother    3  Family history of depression (V17 0) (Z81 8)  Family History    4  Family history of Hodgkin Disease   5  Family history of Hypothyroidism   6   Family history of Osteoporosis (V17 81)   7  Family history of Stroke Syndrome (V17 1)    Social History    · Being A Social Drinker   · College graduate   · Marital History - Currently    · Never A Smoker   · No drug use   · Denied: History of Tobacco Use   · Travel / Residence In [place]   · Denied: History of Using Intravenous Drugs   · Work History    Future Appointments    Date/Time Provider Specialty Site   04/20/2016 11:45 AM SHANTE Green  Psychiatry [facility] PARTIAL HOSPITALIZATION   04/21/2016 11:45 AM SHANTE Green  Psychiatry [facility] PARTIAL HOSPITALIZATION   04/22/2016 11:45 AM SHANTE Green  Psychiatry [facility] PARTIAL HOSPITALIZATION   06/29/2016 02:15 PM SHANTE Jay  Psychiatry [facility]   05/05/2016 11:15 AM Vickie Cabrera MS, APRN, PMHCNS_BS  [facility] PSYCH ASSOC THERAPISTS     Signatures   Electronically signed by : ALEYDA Marte;  Apr 19 2016  2:20PM EST                       (Author)    Electronically signed by : Gloria Hoang LCSW; Apr 19 2016  2:28PM EST                       (Author)    Electronically signed by : Payton Trujillo RN; Apr 19 2016  4:24PM EST                       (Author)

## 2018-01-11 NOTE — PSYCH
History of Present Illness  [facility] Progress [facility]:   Specialized Services Documentation - Therapist must complete separate progress note for each specific clinical activity in which the client participated during the day  (284) Group Psychotherapy: (5048-6189) Antonio Hamilton briefly participated towards the end of psychotherapy group focused on stressors  Antonio Hamilton joined in peer discussion centered on family responsibilities  Although she engaged in conversation with a peer, she did not share her own personal issues that this writer knew she had that were similar to this particular peer's issues  Slow progress towards goal  Continue group to offer opportunity to relate to peers around similar issues  Treatment Plan Problem(s): 1 1  Kenny Martinez LCSW     (652) Group Psychotherapy: [id number] Antonio Hamilton participated in wellness group focused on learning to use positive coping skills to replace negative âhabitsâ of coping with mental health symptoms, as well as daily life  Pt shared more actively in group today her struggles to complete activities of daily living and being "underproductive" due to depression  Peers encouraged pt to "take baby steps" and productivity can increase as pt begins to practice better coping strategies than were being used such as "going to bed when depressed " Pt liked the suggestion of keeping a small notebook with top goals listed and checked off as they are accomplished throughout the day  Antonio Hamilton stated that doing this would help her to see her progress toward increased productivity  Pt stated she will try this behavioral strategy in recovery  Pt made good progress toward goal  Continue group to educate pt and provide opportunity to rehearse new behaviors and try new, healthier cognitive strategies for coping  Treatment Plan Problem(s): 1 1  Radha Woody, RN       (153) Education Therapy Goals set - "I am going to exercise"    Treatment Plan Problem(s): 1 1  Education Therapy Time - 0900 - 0930 Previous goal was not met  Readiness to Learning:  She is receptive to learning  There are  no barriers to learning  Learning Assessment Time - 1330 - 1400   Education completed on  illness and wellness tools  The teaching method was  verbal and demonstration  Shared area of learning: Yes  ALEYDA Oliver     (208) Allied Therapy 9770-6958 [facility] actively shared in [facility] group focused on challenging cognitive distortions and relaxation exercises  Fully engaged in therapist led relaxation exercises offering feedback related to benefits  She expressed struggling with unhelpful thinking styles and found learning the different types of cognitive distortions to be very helpful  She asked questions related to [facility] to challenge them and engaged as group practiced CBT technique to challenge and change thoughts  She was encouraged to do additional practice of skill/strategy with given handout  Some beginning effort noted toward goal  Continue AT to increase awareness and use of wellness tools  Treatment Plan Problem(s): 1 1, 1 2  ALEYDA Oliver       Case Management Note:   (2887-5798) [facility] denied significant issues, but tardiness to program occurred again today  After program yesterday she took her son to his equestrian therapy appointment and did not nap  She again got to bed later than she wanted to--around midnight  [facility] denied SI since she began program  She attributed recent SI to becoming overwhelmed with life stressors such that she was unable to rationally problem solve  Finances remain a worry since at this time her  is not getting paid due to the strike  Disorganization and her son's special needs remain ongoing stressors  It is unclear what [facility] has deemed priorities to address while in program  She often becomes tangential, but realizes she has gotten somewhat off topic, as she will stop herself and say she was rambling   Ineffective coping continues  TREATMENT SESSION NUMBER: 4   Current suicide risk is low  Medications not changed/added/denied  Simin Shafer LCSW      Active Problems    1  Abnormal CBC (790 6) (R79 89)   2  Allergic rhinitis (477 9) (J30 9)   3  Asthma (493 90) (J45 909)   4  Asymptomatic menopausal state (V49 81) (Z78 0)   5  Breast cancer screening (V76 10) (Z12 39)   6  Bronchospasm (519 11) (J98 01)   7  Eczema (692 9) (L30 9)   8  Encounter for screening mammogram for malignant neoplasm of breast (V76 12)   (Z12 31)   9  Fatigue (780 79) (R53 83)   10  JOSE ROBERTO (generalized anxiety disorder) (300 02) (F41 1)   11  History of allergy (V15 09) (Z88 9)   12  Hyperlipidemia (272 4) (E78 5)   13  Laboratory examination ordered as part of a routine general medical examination    (V72 62) (Z00 00)   14  Major depressive disorder, recurrent, moderate (296 32) (F33 1)   15  Need for hepatitis B vaccination (V05 3) (Z23)   16  Need for prophylactic vaccination and inoculation against bacterial diseases (V03 9)    (Z23)   17  Need for prophylactic vaccination and inoculation against influenza (V04 81) (Z23)   18  Positive PPD (795 51) (R76 11)   19  Post-menopausal (V49 81) (Z78 0)   20  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)   21  Screening examination for pulmonary tuberculosis (V74 1) (Z11 1)   22  Severe recurrent major depression without psychotic features (296 33) (F33 2)   23  Vitamin D deficiency (268 9) (E55 9)    Past Medical History    1  History of A Fall (E888 9)   2  History of Acute upper respiratory infection (465 9) (J06 9)   3  History of Atelectasis (518 0) (J98 11)   4  History of Contusion Of The Chest Wall With Intact Skin Surface (922 1)   5  History of acute bronchitis (V12 69) (Z87 09)   6  History of acute bronchitis (V12 69) (Z87 09)   7  History of fatigue (V13 89) (Z87 898)   8  Denied: History of head injury   9  History of low back pain (V13 59) (Z87 39)   10   History of low back pain (V13 59) (Z87 39)   11  History of shortness of breath (V13 89) (Z87 898)   12  History of Joint pain, knee (719 46) (M25 569)   13  Need for hepatitis B vaccination (V05 3) (Z23)   14  Need for prophylactic vaccination and inoculation against bacterial diseases (V03 9)    (Z23)   15  Need for prophylactic vaccination and inoculation against influenza (V04 81) (Z23)   16  History of Palpitations (785 1) (R00 2)   17  History of 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)   18  Denied: History of Seizures   19  History of Subconjunctival hemorrhage, unspecified laterality (372 72) (H11 30)    Allergies    1  No Known Drug Allergies    Current Meds   1  Advair Diskus 250-50 MCG/DOSE Inhalation Aerosol Powder Breath Activated; 1 PUFF   TWICE DAILY; RINSE MOUTH AFTER USE AS NEEDED; Therapy: 54WEC3251 to (Evaluate:95Eqg1954)  Requested for: 05Nzd2669; Last   Rx:19Wmx3260 Ordered   2  ALPRAZolam 0 5 MG Oral Tablet; Take 1 tablet 3 times daily as needed; Therapy: 18EJK4576 to (Evaluate:28Yxb7452)  Requested for: 11Ldw9975; Last   Rx:02Mar2016; Status: ACTIVE - Renewal Denied Ordered   3  BuPROPion HCl ER (SR) 100 MG Oral Tablet Extended Release 12 Hour; Take 1 tablet   daily; Therapy: 43Wwt6688 to (Evaluate:94Ebw1514)  Requested for: 99Jhj2257; Last   Rx:08Qph4323 Ordered   4  Calcium 1250 MG TABS; TAKE 1 TABLET DAILY; Therapy: 35OIX6751 to Recorded   5  Fluticasone Propionate 50 MCG/ACT Nasal Suspension; USE 2 SPRAYS IN EACH   NOSTRIL ONCE DAILY; Therapy: 54KSF3796 to ([facility])  Requested for: 68YRX0920; Last   Rx:00Bcn2057 Ordered   6  Multi-Vitamins Oral Tablet; TAKE 1 TABLET DAILY; Therapy: 76HGR8450 to Recorded   7  PARoxetine HCl - 30 MG Oral Tablet; TAKE 1 TABLET IN THE EVENING; Therapy: 44NQX1711 to (Evaluate:52Pfs6924)  Requested for: 91ELJ7116; Last   Rx:02Mar2016 Ordered   8   ProAir  (90 Base) MCG/ACT Inhalation Aerosol Solution; INHALE 2 PUFFS   EVERY 4 HOURS AS NEEDED FOR COUGH AND WHEEZE;   Therapy: 40VAP6321 to (Evaluate:89Foa1267)  Requested for: 21Apr2015; Last   Rx:21Apr2015 Ordered   9  Verapamil HCl  MG Oral Capsule Extended Release 24 Hour; TAKE 1 CAPSULE   Weekly; Therapy: 07Ost7794 to (Evaluate:44Jrj9981) Recorded   10  Vitamin C 1000 MG Oral Tablet; TAKE 1 TABLET DAILY; Therapy: 89LLV5670 to Recorded   11  Vitamin D3 2000 UNIT Oral Capsule; TAKE 1 CAPSULE Daily; Therapy: 99KSD5125 to (Last Rx:10Yzn5528) Ordered    Family Psych History  Mother    1  No family history of mental disorder  Father    2  No family history of mental disorder  Grandmother    3  Family history of depression (V17 0) (Z81 8)  Family History    4  Family history of Hodgkin Disease   5  Family history of Hypothyroidism   6  Family history of Osteoporosis (V17 81)   7  Family history of Stroke Syndrome (V17 1)    Social History    · Being A Social Drinker   · College graduate   · Marital History - Currently    · Never A Smoker   · No drug use   · Denied: History of Tobacco Use   · Travel / Residence In [place]   · Denied: History of Using Intravenous Drugs   · Work History    Future Appointments    Date/Time Provider Specialty Site   04/21/2016 11:45 AM SHANTE Leal  Psychiatry [facility] PARTIAL HOSPITALIZATION   04/22/2016 11:45 AM SHANTE Leal  Psychiatry [facility] HOSPITALIZATION   06/29/2016 02:15 PM SHANTE Hernandez  Psychiatry [facility] PSYCHIATRIC ASSOC   05/05/2016 11:15 AM Altagracia Cabrera, MS, APRN, PMHCNS_BS  [facility] ASS THERAPISTS     Signatures   Electronically signed by : Kathy De Leon LCSW; Apr 20 2016  1:12PM EST                       (Author)    Electronically signed by : ALEYDA Oliver;  Apr 20 2016  2:14PM EST                       (Author)    Electronically signed by : Stephanie Loja RN; Apr 20 2016  2:32PM EST                       (Author)

## 2018-01-11 NOTE — PSYCH
Psych Med Mgmt    Appearance: was calm and cooperative, adequate hygiene and grooming, demonstrated behavior psychomotor retardation and poor eye contact  Observed mood: depressed and anxious  Observed mood: affect was blunted  Speech: a normal rate and fluent   Normal volume  Thought processes: coherent/organized   No loose associations  Hallucinations: no hallucinations present  Thought Content: no delusions  Abnormal Thoughts: The patient has no suicidal thoughts and no homicidal thoughts  Orientation: The patient is oriented to person, place and time  Recent and Remote Memory: short term memory intact and long term memory intact  Attention Span And Concentration: concentration intact  Insight: Insight intact  Judgment: Her judgment was intact  Muscle Strength And Tone  Normal gait and station  Language: no difficulty naming common objects and no difficulty repeating a phrase  Fund of knowledge: Patient displays adequate knowledge of current events, adequate fund of knowledge regarding past history and adequate fund of knowledge regarding vocabulary  The patient is experiencing no localized pain  Treatment Recommendations: Pt is having moderate depressive and anxiety Sxs and GI SE to Bupropion XL  Pt already stopped the Bupropion XL and I strongly advised against making medication changes on her own  I will increase Paroxetine now  Pt offered the [employer] [facility] but refuses this at this time  She denies any SI or HI and I gave crisis line/suicide hot line #s anyway  She verbally contracts for safety  She does not appear to be a danger to herself or others at present and she will be followed as an outpatient  I encouraged her to think about volunteerism to then segue into a job  Gave information for [employer] "[facility] to [employer]" course and phone # with website      Pt accepts the plan     Formally D/C Bupropion XL  Increase Paroxetine to 40mg (1) tab po qd # 90  Continue the following, of which she has refills from prior Rx:  Alprazolam 0 5mg (1) tab po tid prn anxiety  Vitamin D over the counter preparation   Continue psychotherapy  F/U Dr Luis Seen 5/11/2017 3:00PM--appt set  Risks, Benefits And Possible Side Effects Of Medications: Risks, benefits, and possible side effects of medications explained to patient and patient verbalizes understanding  Discussed with patient the risks of sedation, respiratory depression, impairment of ability to drive and potential for abuse and addiction related to treatment with benzodiazepine medications  The patient understands risk of treatment with benzodiazepine medications, agrees to not drive if feels impaired and agrees to take medications as prescribed  The patient has been filling controlled prescriptions on time as prescribed to [employer] [facility] program     She reports decreased appetite, decreased energy, no weight change and increase in number of sleep hours "Sometimes too much"  Shelbi Branham is a 62y/o AA female here for medication review with primary c/o "Low energy, just same general depression " She is sleeping to "Escape " She is self-isolating, functioning at home is difficult due to lack of motivation and energy  She feels overwhelmed  Pt blames herself for her marriage difficulties because her  would like her to work but she does not feel she can  She has almost daily anxiety with twice monthly panic attacks  Panic Sxs are severe (8/10) anxiety, crying, SOB, and heart racing  She presently denies SI, HI, or manic or psychotic Sxs, and denies any ETOH or illicit drug abuse  She reports compliance to her medicines, but Bupropion XL was causing nausea  She tried taking it with a meal but it did not help so she stopped the drug altogether approx 3 weeks ago  No present nausea  Pt continues Psychotherapy with Mrs Cabrera whose notes I reviewed her 12/2017 through 3/2017 notes        Vitals  Signs   Recorded: 83Iht4966 05:16PM   Heart Rate: 66  Systolic: 008, LUE, Sitting  Diastolic: 82, LUE, Sitting  Height: 5 ft 3 in  Weight: 163 lb 3 2 oz  BMI Calculated: 28 91  BSA Calculated: 1 77    Assessment    1  Major depressive disorder, recurrent severe without psychotic features (296 33) (F33 2)   2  JOSE ROBERTO (generalized anxiety disorder) (300 02) (F41 1)   3  Vitamin D deficiency (268 9) (E55 9)    Plan    1  PARoxetine HCl - 40 MG Oral Tablet; TAKE 1 TABLET IN THE EVENING    Review of Systems    Constitutional: feeling tired, but as noted in HPI  Cardiovascular: fast heart rate and during panic attacks  Respiratory: during panic attacks, but as noted in HPI  Gastrointestinal: nausea, but as noted in HPI  Genitourinary: no complaints of dysuria, no incontinence, no pelvic pain, no urinary frequency  Musculoskeletal: no complaints of arthralgia, no myalgias, no limb pain, no joint stiffness  Integumentary: no complaints of skin rash, no itching, no dry skin  Neurological: no complaints of headache, no confusion, no numbness, no dizziness  Past Psychiatric History    Past Psychiatric History: Pt has h/o participation in the [employer] [facility] twice in the past       Pt denies any h/o suicide attempts, self-mutilation/self harm behaviors, violent behaviors, psychiatric admissions, ECT, or legal or  Hx  Substance Abuse Hx    Substance Abuse History: Pt denies any h/o ETOH or illicit drug abuse  Active Problems    1  Abnormal CBC (790 6) (R79 89)   2  Allergic rhinitis (477 9) (J30 9)   3  Asthma (493 90) (J45 909)   4  Asymptomatic menopausal state (V49 81) (Z78 0)   5  Breast cancer screening (V76 10) (Z12 39)   6  Bronchospasm (519 11) (J98 01)   7  Eczema (692 9) (L30 9)   8  Encounter for screening mammogram for malignant neoplasm of breast (V76 12)   (Z12 31)   9  Fatigue (780 79) (R53 83)   10  JOSE ROBERTO (generalized anxiety disorder) (300 02) (F41 1)   11   History of allergy (V15 09) (Z88 9)   12  Hyperlipidemia (272 4) (E78 5)   13  Laboratory examination ordered as part of a routine general medical examination    (V72 62) (Z00 00)   14  Major depressive disorder, recurrent severe without psychotic features (296 33) (F33 2)   15  Marital conflict (R85 26) (K10 9)   16  Need for hepatitis B vaccination (V05 3) (Z23)   17  Need for prophylactic vaccination and inoculation against bacterial diseases (V03 9)    (Z23)   18  Need for prophylactic vaccination and inoculation against influenza (V04 81) (Z23)   19  Positive PPD (795 51) (R76 11)   20  Post-menopausal (V49 81) (Z78 0)   21  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)   22  Screening examination for pulmonary tuberculosis (V74 1) (Z11 1)   23  Vitamin D deficiency (268 9) (E55 9)    Past Medical History    1  History of A Fall (E888 9)   2  History of Acute upper respiratory infection (465 9) (J06 9)   3  History of Atelectasis (518 0) (J98 11)   4  History of Contusion Of Chest Wall With Intact Skin Surface (922 1)   5  History of acute bronchitis (V12 69) (Z87 09)   6  History of acute bronchitis (V12 69) (Z87 09)   7  History of fatigue (V13 89) (Z87 898)   8  Denied: History of head injury   9  History of low back pain (V13 59) (Z87 39)   10  History of low back pain (V13 59) (Z87 39)   11  History of shortness of breath (V13 89) (Z87 898)   12  History of Joint pain, knee (719 46) (M25 569)   13  Need for hepatitis B vaccination (V05 3) (Z23)   14  Need for prophylactic vaccination and inoculation against bacterial diseases (V03 9)    (Z23)   15  Need for prophylactic vaccination and inoculation against influenza (V04 81) (Z23)   16  History of Palpitations (785 1) (R00 2)   17  History of 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)   18  Denied: History of Seizures   19   History of Subconjunctival hemorrhage, unspecified laterality (372 72) (H11 30)    The active problems and past medical history were reviewed and updated today  Allergies    1  No Known Drug Allergies    Current Meds   1  Advair Diskus 250-50 MCG/DOSE Inhalation Aerosol Powder Breath Activated; 1 PUFF   TWICE DAILY; RINSE MOUTH AFTER USE AS NEEDED; Therapy: 76XME4708 to (Evaluate:99Mhi3313)  Requested for: 21Apr2015; Last   Rx:21Apr2015 Ordered   2  ALPRAZolam 0 5 MG Oral Tablet; Take 1 tablet 3 times daily as needed; Therapy: 62JQK8675 to (467 50 057)  Requested for: 29VFN5293; Last   Rx:61Bdt8091 Ordered   3  Calcium 1250 MG TABS; TAKE 1 TABLET DAILY; Therapy: 80VZC1343 to Recorded   4  Fluticasone Propionate 50 MCG/ACT Nasal Suspension; USE 2 SPRAYS IN EACH   NOSTRIL ONCE DAILY; Therapy: 60WLA1962 to (Clarissa Diss)  Requested for: 70TSQ3668; Last   Rx:25Unb5869 Ordered   5  Multi-Vitamins Oral Tablet; TAKE 1 TABLET DAILY; Therapy: 50OTS2289 to Recorded   6  PARoxetine HCl - 30 MG Oral Tablet; TAKE 1 TABLET IN THE EVENING; Therapy: 58ZXU5268 to (Evaluate:01Lpv6046)  Requested for: 13NHX4855; Last   Rx:66Sto3846 Ordered   7  ProAir  (90 Base) MCG/ACT Inhalation Aerosol Solution; INHALE 2 PUFFS   EVERY 4 HOURS AS NEEDED FOR COUGH AND WHEEZE;   Therapy: 45ZEY1591 to (Evaluate:91Cvv5595)  Requested for: 21Apr2015; Last   Rx:21Apr2015 Ordered   8  Verapamil HCl  MG Oral Capsule Extended Release 24 Hour; TAKE 1 CAPSULE   Weekly; Therapy: 92Qkz5390 to (Evaluate:23Jun2015) Recorded   9  Vitamin C 1000 MG Oral Tablet; TAKE 1 TABLET DAILY; Therapy: 39XUK2938 to Recorded   10  Vitamin D3 2000 UNIT Oral Capsule; TAKE 1 CAPSULE Daily; Therapy: 71SMH8346 to (Last Rx:25Oct2013) Ordered    The medication list was reviewed and updated today  Family Psych History  Mother    1  No family history of mental disorder  Father    2  No family history of mental disorder  Grandmother    3  Family history of depression (V17 0) (Z81 8)  Family History    4  Family history of Hodgkin Disease   5   Family history of Hypothyroidism   6  Family history of Osteoporosis (V17 81)   7  Family history of Stroke Syndrome (V17 1)    The family history was reviewed and updated today  Social History    · Being A Social Drinker   · College graduate   · Marital conflict (V96 76) (I24 4)   · Marital History - Currently    · Never A Smoker   · No drug use   · Denied: History of Tobacco Use   · Travel / Residence In [place]   · Denied: History of Using Intravenous Drugs   · Work History  The social history was reviewed and updated today  The social history was reviewed and is unchanged  History Of Phys/Sex Abuse Or Perpetration    History Of Phys/Sex Abuse or Perpetration: Pt denies any h/o childhood physical or sexual abuse  Education  Pt denies any h/o learning disabilities and reached childhood milestones on time as far as she knows  End of Encounter Meds    1  Advair Diskus 250-50 MCG/DOSE Inhalation Aerosol Powder Breath Activated; 1 PUFF   TWICE DAILY; RINSE MOUTH AFTER USE AS NEEDED; Therapy: 46GTD6227 to (Evaluate:89Ksu8277)  Requested for: 21Apr2015; Last   Rx:21Apr2015 Ordered   2  Fluticasone Propionate 50 MCG/ACT Nasal Suspension; USE 2 SPRAYS IN EACH   NOSTRIL ONCE DAILY; Therapy: 74SCL3263 to (Manjeet So)  Requested for: 79ZWQ5263; Last   Rx:42Ozj4269 Ordered    3  ProAir  (90 Base) MCG/ACT Inhalation Aerosol Solution; INHALE 2 PUFFS   EVERY 4 HOURS AS NEEDED FOR COUGH AND WHEEZE;   Therapy: 69UAO2515 to (Evaluate:63Nhd0139)  Requested for: 21Apr2015; Last   Rx:11Ebq1236 Ordered    4  ALPRAZolam 0 5 MG Oral Tablet (Xanax); Take 1 tablet 3 times daily as needed; Therapy: 40YEO9495 to (Katy Hassan)  Requested for: 61HLM8443; Last   Rx:68Ilb1409 Ordered    5  PARoxetine HCl - 40 MG Oral Tablet; TAKE 1 TABLET IN THE EVENING; Therapy: 28DVU2432 to (Evaluate:21Wgb6699)  Requested for: 41Xqq7247; Last   Rx:48Mot1124 Ordered    6   Calcium 1250 MG TABS; TAKE 1 TABLET DAILY; Therapy: 85WSQ4462 to Recorded   7  Multi-Vitamins Oral Tablet; TAKE 1 TABLET DAILY; Therapy: 83MQA8299 to Recorded   8  Vitamin C 1000 MG Oral Tablet; TAKE 1 TABLET DAILY; Therapy: 43HPS0803 to Recorded    9  Verapamil HCl  MG Oral Capsule Extended Release 24 Hour; TAKE 1 CAPSULE   Weekly; Therapy: 79Hub1772 to (Evaluate:23Jun2015) Recorded    10  Vitamin D3 2000 UNIT Oral Capsule; TAKE 1 CAPSULE Daily; Therapy: 42LAV9734 to (Last Rx:25Oct2013) Ordered    Future Appointments    Date/Time Provider Specialty Site   05/11/2017 03:00 PM SHANTE Wood   Psychiatry [facility]   04/12/2017 11:15 AM Merary Cabrera, MS, APRN, PMHCNS_BS  [facility]   05/03/2017 10:15 AM Vaibhav Liriano, MS, APRN, PMHCNS_BS  [facility]   05/17/2017 04:15 PM Vaibhav Liriano, MS, APRN, PMHCNS_BS  [facility]   06/01/2017 01:15 PM Vaibhav Liriano, MS, APRN, PMHCNS_BS  [facility]   06/14/2017 10:15 AM Vaibhav Liriano, MS, APRN, PMHCNS_BS  [facility]   06/28/2017 10:15 AM Vaibhav Liriano, MS, APRN, PMHCNS_BS  [facility]     Signatures   Electronically signed by : [facility]; Apr 10 2017  5:37PM EST                       (Author)    Electronically signed by : [facility]; Apr 10 2017  5:42PM EST                       (Author)    Electronically signed by : SHANTE Akhtar ; Apr 11 2017  1:09PM EST                       (Acknowledgement)    Electronically signed by : SHANTE Akhtar ; Apr 11 2017  1:09PM EST                       (Acknowledgement)

## 2018-01-11 NOTE — MISCELLANEOUS
Message  This Therapist had patients all day yesterday, and I returned pt's phone call today, [phone], and left a message confirming today's 11:15 am joe't    I also spoke with "David Gordon" from [address] [place] [address] this morning at 10am,and she reported that she spoke with pt  Nessa File) at her home yesterday for one hour, the pt was depressed but she did verbally Contract for Safety,and the pt then attended to activities re: taking care of her autistic son after school,and pt told Crisis worker David Gordon that pt had a meeting at [facility] today,so she wouldn't be going to hospital, evidently  ( This Therapist predicted that pt may call us when the newspapers reported that [employer]) was on-strike  Pt has wanted Social Security to speed-up the review of her Disability claim for a while, and this recent [employer] factor may be a trigger for increased depression in pt   to be evaluated today  ) If pt is still very depressed, and continuing victor m for safety, this Therapist will recommend  [facility] to pt , so that she can still tend to her son's needs at home after his school hours, since this is a big priority in pt's life  [Pt and  may also have to consider other problem-solving / financial -relief options, such as selling pt's  parents' home in [place], and selling  parents' property in a [place], options which have been discussed in previous therapy sessions ]  -Norbert Cabrera, MS, APRN, PMHCNS      Active Problems    1  Abnormal CBC (790 6) (R79 89)   2  Allergic rhinitis (477 9) (J30 9)   3  Asthma (493 90) (J45 909)   4  Asymptomatic menopausal state (V49 81) (Z78 0)   5  Breast cancer screening (V76 10) (Z12 39)   6  Bronchospasm (519 11) (J98 01)   7  Eczema (692 9) (L30 9)   8  Encounter for screening mammogram for malignant neoplasm of breast (V76 12)   (Z12 31)   9  Fatigue (780 79) (R53 83)   10   JOSE ROBERTO (generalized anxiety disorder) (300 02) (F41 1)   11  History of allergy (V15 09) (Z88 9)   12  Hyperlipidemia (272 4) (E78 5)   13  Laboratory examination ordered as part of a routine general medical examination    (V72 62) (Z00 00)   14  Major depressive disorder, recurrent, moderate (296 32) (F33 1)   15  Need for hepatitis B vaccination (V05 3) (Z23)   16  Need for prophylactic vaccination and inoculation against bacterial diseases (V03 9)    (Z23)   17  Need for prophylactic vaccination and inoculation against influenza (V04 81) (Z23)   18  Positive PPD (795 51) (R76 11)   19  Post-menopausal (V49 81) (Z78 0)   20  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)   21  Screening examination for pulmonary tuberculosis (V74 1) (Z11 1)   22  Severe recurrent major depression without psychotic features (296 33) (F33 2)   23  Vitamin D deficiency (268 9) (E55 9)    Current Meds   1  Advair Diskus 250-50 MCG/DOSE Inhalation Aerosol Powder Breath Activated; 1 PUFF   TWICE DAILY; RINSE MOUTH AFTER USE AS NEEDED; Therapy: 59UYC1001 to (Evaluate:12Mgw3370)  Requested for: 21Apr2015; Last   Rx:21Apr2015 Ordered   2  ALPRAZolam 0 5 MG Oral Tablet (Xanax); Take 1 tablet 3 times daily as needed; Therapy: 86RKI4320 to (Evaluate:91Xsr5403)  Requested for: 53Uge1620; Last   Rx:02Mar2016; Status: ACTIVE - Renewal Denied Ordered   3  Calcium 1250 MG TABS; TAKE 1 TABLET DAILY; Therapy: 06IZG8848 to Recorded   4  Fluticasone Propionate 50 MCG/ACT Nasal Suspension; USE 2 SPRAYS IN EACH   NOSTRIL ONCE DAILY; Therapy: 60NDD2061 to (Paulo Lundborg)  Requested for: 44MFP2191; Last   Rx:51Xzc3407 Ordered   5  Multi-Vitamins Oral Tablet; TAKE 1 TABLET DAILY; Therapy: 87RWJ8815 to Recorded   6  PARoxetine HCl - 30 MG Oral Tablet; TAKE 1 TABLET IN THE EVENING; Therapy: 37CLC2343 to (Evaluate:08Fjw4163)  Requested for: 27NJU1876; Last   Rx:02Mar2016 Ordered   7   ProAir  (90 Base) MCG/ACT Inhalation Aerosol Solution; INHALE 2 PUFFS   EVERY 4 HOURS AS NEEDED FOR COUGH AND WHEEZE;   Therapy: 43NOO5265 to (Evaluate:21Zug6263)  Requested for: 21Apr2015; Last   Rx:21Apr2015 Ordered   8  Verapamil HCl  MG Oral Capsule Extended Release 24 Hour; TAKE 1 CAPSULE   Weekly; Therapy: 02Xwg8767 to (Evaluate:23Jun2015) Recorded   9  Vitamin C 1000 MG Oral Tablet; TAKE 1 TABLET DAILY; Therapy: 39OQH6210 to Recorded   10  Vitamin D3 2000 UNIT Oral Capsule; TAKE 1 CAPSULE Daily; Therapy: 43CRL8392 to (Last Rx:25Oct2013) Ordered    Allergies    1  No Known Drug Allergies    Signatures   Electronically signed by : Merlinda Bank, MSAPRNPMHCNS-BC;  Apr 14 2016 10:36AM EST                       (Author)

## 2018-01-11 NOTE — PSYCH
History of Present Illness  [facility] Progress Note [facility]:   Specialized Services Documentation - Therapist must complete separate progress note for each specific clinical activity in which the client participated during the day  (034) Group Psychotherapy: ([id number]) Krupa Crowe participated only during the introductory phase of psychotherapy group focused on coping  She did not join in any peer discussions and her eye contact was poor throughout group  No progress towards goal  Continue group to offer opportunity to relate to peers around similar issues  Treatment Plan Problem(s): 1 1  Illa Bloch, LCSW     (775) Group Psychotherapy: [id number] Krupa Crowe participated in wellness group discussing "Masks people wear" or dysfunctional coping strategies such as being a "people pleaser", "victim", or "rescuer"  Pt only shared that she has the "overwhelmed" mask on and that everything overwhelms her these days  Pt appeared tired during peer discussion but was attentive although did not participate again in discussion of more positive coping styles interacting on a daily basis  Pt made no progress toward goal  Continue group to educate pt and provide opportunity for [place] Crowe to practice healthy coping skills to decrease dysfunctional daily coping behaviors and cognitions  Treatment Plan Problem(s): 1 1  Fawn Su RN       (800) Education Therapy Goals set - get nails done    Treatment Plan Problem(s): 1 1  Education Therapy Time - 0900 - 0930 Previous goal was not met  Readiness to Learning:  She is receptive to learning  There are  no barriers to learning  Learning Assessment Time - 1330 - 1400   Education completed on  illness and wellness tools  The teaching method was  verbal  Shared area of learning: Yes  Joy Ferreira, MT-BC     (095) Allied Therapy [id number] Krupaemil Kanisaac moderately shared in [facility] group focused on motivation   She engaged in yazan discussion and tasks exploring definition of, challenges to and ways to improve motivation  Group explored Behavioral Activation techniques to encourage movement and set self up for success  She appeared attentive taking notes, yet had poor eye contact  Group discussed the risk of thinking about illness/ issues over and over (ruminating) without taking positive action  Slow effort noted toward goals  Continue AT to explore wellness tools and encourage active practice  Treatment Plan Problem(s): 1 1, 1 2  Mya Pavon, [facility]       Case Management Note:   ([id number]) Pt  reported her sleep schedule was "off" over the weekend  Further discussion revealed she fell aslep on the couch Friday night, woke up at 0300 and stayed awake because she feared she would oversleep and miss her son's horse therapy appointment at 1100  Afterwards she napped and was underproductive  Sunday she got up early and later in the morning they streamed in the [employer] service from [address]  She again napped after breakfast until 3:30pm at which time her  woke her up to accompany he, their son, and his nephew for pizza and then a movie  She continues to struggle with psychomotor retardation, poor motivation,sleeping too much, anhedonia, and low energy  She becomes easily overwhelmed with disorganization and then shuts down which perpetuates the poor motivation, etc  She did not  the Wellbutrin prescribed by program psychiatrist 4/15/16  Pt  was informed she was approved for 12 days by her insurance company  Treatment plan reviewed  TREATMENT SESSION NUMBER: 2   Current suicide risk is low  Medications not changed/added/denied  Fredy Jeff, LCSW      Active Problems    1  Abnormal CBC (790 6) (R79 89)   2  Allergic rhinitis (477 9) (J30 9)   3  Asthma (493 90) (J45 909)   4  Asymptomatic menopausal state (V49 81) (Z78 0)   5  Breast cancer screening (V76 10) (Z12 39)   6  Bronchospasm (519 11) (J98 01)   7  Eczema (692 9) (L30 9)   8   Encounter for screening mammogram for malignant neoplasm of breast (V76 12)   (Z12 31)   9  Fatigue (780 79) (R53 83)   10  JOSE ROBERTO (generalized anxiety disorder) (300 02) (F41 1)   11  History of allergy (V15 09) (Z88 9)   12  Hyperlipidemia (272 4) (E78 5)   13  Laboratory examination ordered as part of a routine general medical examination    (V72 62) (Z00 00)   14  Major depressive disorder, recurrent, moderate (296 32) (F33 1)   15  Need for hepatitis B vaccination (V05 3) (Z23)   16  Need for prophylactic vaccination and inoculation against bacterial diseases (V03 9)    (Z23)   17  Need for prophylactic vaccination and inoculation against influenza (V04 81) (Z23)   18  Positive PPD (795 51) (R76 11)   19  Post-menopausal (V49 81) (Z78 0)   20  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)   21  Screening examination for pulmonary tuberculosis (V74 1) (Z11 1)   22  Severe recurrent major depression without psychotic features (296 33) (F33 2)   23  Vitamin D deficiency (268 9) (E55 9)    Past Medical History    1  History of A Fall (E888 9)   2  History of Acute upper respiratory infection (465 9) (J06 9)   3  History of Atelectasis (518 0) (J98 11)   4  History of Contusion Of The Chest Wall With Intact Skin Surface (922 1)   5  History of acute bronchitis (V12 69) (Z87 09)   6  History of acute bronchitis (V12 69) (Z87 09)   7  History of fatigue (V13 89) (Z87 898)   8  Denied: History of head injury   9  History of low back pain (V13 59) (Z87 39)   10  History of low back pain (V13 59) (Z87 39)   11  History of shortness of breath (V13 89) (Z87 898)   12  History of Joint pain, knee (719 46) (M25 569)   13  Need for hepatitis B vaccination (V05 3) (Z23)   14  Need for prophylactic vaccination and inoculation against bacterial diseases (V03 9)    (Z23)   15  Need for prophylactic vaccination and inoculation against influenza (V04 81) (Z23)   16  History of Palpitations (785 1) (R00 2)   17   History of 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)   18  Denied: History of Seizures   19  History of Subconjunctival hemorrhage, unspecified laterality (372 72) (H11 30)    Allergies    1  No Known Drug Allergies    Current Meds   1  Advair Diskus 250-50 MCG/DOSE Inhalation Aerosol Powder Breath Activated; 1 PUFF   TWICE DAILY; RINSE MOUTH AFTER USE AS NEEDED; Therapy: 93QLR4429 to (Evaluate:43Bpd8014)  Requested for: 21Apr2015; Last   Rx:21Apr2015 Ordered   2  ALPRAZolam 0 5 MG Oral Tablet; Take 1 tablet 3 times daily as needed; Therapy: 23PTN2814 to (Evaluate:00Lir6737)  Requested for: 91Peg5205; Last   Rx:02Mar2016; Status: ACTIVE - Renewal Denied Ordered   3  BuPROPion HCl ER (SR) 100 MG Oral Tablet Extended Release 12 Hour; Take 1 tablet   daily; Therapy: 65Nbq6149 to (Evaluate:05Yoy5473)  Requested for: 55Glc5350; Last   Rx:15Apr2016 Ordered   4  Calcium 1250 MG TABS; TAKE 1 TABLET DAILY; Therapy: 10JMI5433 to Recorded   5  Fluticasone Propionate 50 MCG/ACT Nasal Suspension; USE 2 SPRAYS IN EACH   NOSTRIL ONCE DAILY; Therapy: 54KDC7810 to ([facility])  Requested for: 35NSL0226; Last   Rx:12May2015 Ordered   6  Multi-Vitamins Oral Tablet; TAKE 1 TABLET DAILY; Therapy: 88UTN4125 to Recorded   7  PARoxetine HCl - 30 MG Oral Tablet; TAKE 1 TABLET IN THE EVENING; Therapy: 70HUH0771 to (Evaluate:22Ber6327)  Requested for: 66KHB4819; Last   Rx:02Mar2016 Ordered   8  ProAir  (90 Base) MCG/ACT Inhalation Aerosol Solution; INHALE 2 PUFFS   EVERY 4 HOURS AS NEEDED FOR COUGH AND WHEEZE;   Therapy: 82OPK2415 to (Evaluate:00Lyq3971)  Requested for: 21Apr2015; Last   Rx:21Apr2015 Ordered   9  Verapamil HCl  MG Oral Capsule Extended Release 24 Hour; TAKE 1 CAPSULE   Weekly; Therapy: 20Cfr1472 to (Evaluate:21Hzh0221) Recorded   10  Vitamin C 1000 MG Oral Tablet; TAKE 1 TABLET DAILY; Therapy: 46YZN2685 to Recorded   11  Vitamin D3 2000 UNIT Oral Capsule; TAKE 1 CAPSULE Daily;     Therapy: 25ATQ0278 to (Last Rx:25Oct2013) Ordered    Family Psych History  Mother    1  No family history of mental disorder  Father    2  No family history of mental disorder  Grandmother    3  Family history of depression (V17 0) (Z81 8)  Family History    4  Family history of Hodgkin Disease   5  Family history of Hypothyroidism   6  Family history of Osteoporosis (V17 81)   7  Family history of Stroke Syndrome (V17 1)    Social History    · Being A Social Drinker   · College graduate   · Marital History - Currently    · Never A Smoker   · No drug use   · Denied: History of Tobacco Use   · Travel / Residence In [place]   · Denied: History of Using Intravenous Drugs   · Work History    Future Appointments    Date/Time Provider Specialty Site   04/19/2016 11:45 AM SHANTE Uriarte  Psychiatry [facility]   04/20/2016 11:45 AM SHANTE Uriarte  Psychiatry [facility] HOSPITAL[facility]   04/21/2016 11:45 AM SHANTE Uriarte  Psychiatry [facility] HOSPITAL[facility]   04/22/2016 11:45 AM SHANTE Uriarte  Psychiatry [facility]   06/29/2016 02:15 PM SHANTE Day  Psychiatry [facility]   05/05/2016 11:15 AM Cathryn Cabrera MS, APRN, PMHCNS_BS  [facility]     Signatures   Electronically signed by : Leo Villa LCSW; Apr 18 2016  2:35PM EST                       (Author)    Electronically signed by : ALEYDA Sibley; Apr 18 2016  2:36PM EST                       (Author)    Electronically signed by : ALEYDA Sibley;  Apr 18 2016  2:38PM EST                       (Author)    Electronically signed by : Lisbeth Slaughter RN; Apr 19 2016  6:07PM EST                       (Author)

## 2018-01-12 VITALS
SYSTOLIC BLOOD PRESSURE: 108 MMHG | WEIGHT: 167 LBS | BODY MASS INDEX: 29.59 KG/M2 | HEART RATE: 89 BPM | DIASTOLIC BLOOD PRESSURE: 73 MMHG | HEIGHT: 63 IN

## 2018-01-12 NOTE — PSYCH
Date of Initial Treatment Plan: (Chart not available  )  Date of Current Treatment Plan: 9 /21/ 12  Treatment Plan 10  Strengths/Personal Resources for Self Care: I am a good mother of Christianna Alpers; I also organize his resources for his Special Needs (Autism and Intellectual Disability )   I've been  15 years  I worked hard at [employer] for 23 years, until layoffs occurred  I do take my meds  as prescribed  Diagnosis:   Axis I: Major Depressive Disorder, recurrent, F33 2; Generalized Anxiety Disorder, F41  10  Axis II: Deferred  Axis III: Mitral Valve Prolapse; hx  of palpitations  Area of Needs: I have high Financial Stress, am Unemployed right now,and am stressed re; back taxes  Long Term Goals:   I will try to get the tax data to our  ASAP, and I will try to do my Positive Coping Skills, during this time of stress  Target Date: Taxes: October 1, 2016; The rest: 1/ 21/ 17  Short Term Objectives:   Goal 1:   I participate in Psychotherapy  I take meds as prescribed by Dr Amelie Copeland  In Therapy, I identify my stressors, especially: Financial, Taxes, Marital, and Francesco's needs  I process my thoughts, feelings,and behaviors  I will do cognitive-reframing, focussing on my strengths, my abilities  I will get my tax info to our  by October 1, 2016  I will call my  (Attn'y  Sadie Ramsey) by the end of this week  I will do calm Deep -breathing, and positive Visualization each day  I will try to build-in some exercise, even Walking outside 10 to 20 minutes a day, a few times a week  I try to keep communications open with my  Lucrecia Harvey  I attend Couples groups/ meetings at [facility]  I get out with Christianna Alpers for swimming and horseback -riding, after-school activities--will try to enjoy this Fall weather  Target Date: 1 / 21 /17  GOAL 1: Modality: Individual 2 to 3 x per month Target Date: 1 /21/ 17  GOAL 1: Modality: Group Offered   x per month   GOAL 1: Modality: Couples Offered  And pt attends Couples Group at [facility]  x per month    GOAL 1: Modality: Medication Management 1 x per month Target Date: Ongoing  The person(s) responsible for carrying out the plan is Client: Yael Co; Therapist: Candance Condon; and Psychiatrist: Dr Kaylene Lieberman  The first scheduled review date is 1/ 21/ 17       The expected length of service is 3 to 4 more months       Level of functioning at initial assessment: 55  The highest level of functioning in the past year was 58       The current level of functioning is 58   Po CLIENT COMMENTS / Please share your thoughts, feelings, need and/or experiences regarding your treatment plan: _____________________________________________________________________________________________________________________________________________________________________________________________________________________________________________________________________________________________________________________ Date/Time: ______________     Patient Signature: _________________________________ Date/Time: ______________        1  Abnormal CBC (790 6) (R79 89)   2  Allergic rhinitis (477 9) (J30 9)   3  Asthma (493 90) (J45 909)   4  Asymptomatic menopausal state (V49 81) (Z78 0)   5  Breast cancer screening (V76 10) (Z12 39)   6  Bronchospasm (519 11) (J98 01)   7  Eczema (692 9) (L30 9)   8  Encounter for screening mammogram for malignant neoplasm of breast (V76 12)   (Z12 31)   9  Fatigue (780 79) (R53 83)   10  JOSE ROBERTO (generalized anxiety disorder) (300 02) (F41 1)   11  History of allergy (V15 09) (Z88 9)   12  Hyperlipidemia (272 4) (E78 5)   13  Laboratory examination ordered as part of a routine general medical examination    (V72 62) (Z00 00)   14  Major depressive disorder, recurrent severe without psychotic features (296 33) (F33 2)   15  Need for hepatitis B vaccination (V05 3) (Z23)   16   Need for prophylactic vaccination and inoculation against bacterial diseases (V03 9)    (Z23)   17  Need for prophylactic vaccination and inoculation against influenza (V04 81) (Z23)   18  Positive PPD (795 51) (R76 11)   19  Post-menopausal (V49 81) (Z78 0)   20  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)   21  Screening examination for pulmonary tuberculosis (V74 1) (Z11 1)   22   Vitamin D deficiency (268 9) (E55 9)     Electronically signed by : ELISABET Kyle; Sep 21 2016 12:32PM EST                       (Author)    Electronically signed by : ELISABET Kyle; Sep 21 2016  6:32PM EST                       (Author)

## 2018-01-12 NOTE — PSYCH
Progress Note  Psychotherapy Provided [facility]: Individual Psychotherapy 50 minutes provided today  Goals addressed in session:   Goal #1  D: " I helped a 72yr old neighbor who had fallen, and his wife ---she doesn't get much help from their son, unfortunately "   " And, I'm doing paperwork, to help get Shanique Askew what he needs"   " No, I didn't finish getting the Excel spreadsheet completed, that I wanted to do for the , but I did find some papers, receipts,and things I needed for it---I'm afraid to call him, because he's going to want $2500 more, to do his part of the job,and I don't have it "   " I didn't call my , yet either, to tell him how much Financial hardship and stress we have---And, we've had NO word about my application or a Hearing date "   " If I get Hopeless thoughts, I get some inspiration from my Taoist speakers,and radio shows, like [employer]  [employer] Cassette Lajean Cassette There is always someone in this world who has it worse off than we do, I remind myself "  Lajean Cassette Lajean Cassette Pt has a depressed mood and affect, and she gets intermittent hopeless thoughts  But she denies SI /HI  Has no AH / VH  Pt processes her thoughts, feelings,and behaviors  She is still procrastinating, re: getting info to her ,and procrastinating re: giving a phone-call update to her   We explore this  Cognitive reframing is done-->-"What the worst that could happen if I get these things done?" Pt knows it makes no logical sense to procrastinate   and she and  could be in better financial shape, long-term, if she prioritizes theses things and gets them completed SOON  Pt is assisted with calm , deep-breathing,and re-setting her mind on getting these important things done  Pt is given affirmation for helping her elderly neighbors,and pt  is a [place], giving, woman   pt to put some of this energy into prioritizing herself and family, re: increased security, decreased anxiety and depression, in the future weeks  Pt continues meds, no side effects  A: Major Depressive Disorder , recurrent, F33 2; Generalized Anxiety Disorder, F41 10  P: Continue Treatment Plan, Meds,and Psychotherapy  Pt to stop her procrastination and get the Tax info to her Tax [address], so that further fees and penalties do not accrue  Pt to give a phone call update to her ,also, this week  Pain Scale and Suicide Risk [facility]: Current Pain Assessment: no pain   On a scale of 0 to 10, the patient rates current pain at 0   Current suicide risk is low   Behavioral Health Treatment Plan [employer]: Diagnosis and Treatment Plan explained to patient, patient relates understanding diagnosis and is agreeable to Treatment Plan  Assessment    1  Major depressive disorder, recurrent severe without psychotic features (296 33) (F33 2)   2   JOSE ROBERTO (generalized anxiety disorder) (300 02) (F41 1)    Signatures   Electronically signed by : NATALY Palafox-BC; Oct  5 2016  4:51PM EST                       (Author)    Electronically signed by : ELISABET Palafox; Oct  5 2016  4:51PM EST                       (Author)

## 2018-01-12 NOTE — PSYCH
Provider Comments  Provider Comments:   NO SHOW for today's 10:15am Outpatient Psychotherapy joe't  , so this Therapist called her   >Pt answered the phone and stated she had overslept, as she was up late last night  Pt to attend next scheduled Therapy joe't  (It is also noted that pt attended only 4 days at [facility],and was absent and/ or tardy the other days )---Apple Cabrera MS, APRN, [address]      Signatures   Electronically signed by : Noman House MSAPRNPMHCNS-BC; Jul 19 2016 10:57AM EST                       (Author)
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
## 2018-01-12 NOTE — MISCELLANEOUS
Message   Recorded as Task  Date: 04/13/2016 01:35 PM, Created By: Meaghan Cline  Task Name: Call Back  Assigned To: Pat Aschoff  Regarding Patient: Fitz Pitts, Status: Active  Comment:   Marva Brooks - 13 Apr 2016 1:35 PM    TASK CREATED  Caller: Self; [phone] (Home); [phone] (Work)  MAT CALLED SHE NEEDS TO SPEAK WITH YOU IT IS URGENT SHE SAID  I ASKED IF SHE WOULD LIKE TO TELL ME A LITTLE SOMETHING THAT I COULD RELAY TO YOU  SHE SAID NO I NEEDS TO TALK TO DR Fina Yepez TODAY  MAT'S # [phone]   Spoke with patient - she is very upset about delay in her disability case and wanted us to call Social Security office or her  to "speed up" her case  Her  who is the only financial provider for her family is on strike with [employer] and the patient is very worried about their financial situation  The patient was tearful and expressed hopelessness  I asked her is she had suicidal thoughts: she was initially giving very vague answers "I don't know", and then expressed a desire to take extra medications  She was not able to contract for safety  I told the patient that I was going to contact [facility] to evaluate her at home if she needed inpatient admission  The patient also wanted her therapist Deanne Ferrell to be notified about this situation  I informed the patient that we did not have any influence over Social Security Disability decisions except providing records of her treatment here and our recommendations         Signatures   Electronically signed by : Taras Oppenheim, M D ; Apr 13 2016  3:05PM EST                       (Author)

## 2018-01-12 NOTE — PSYCH
Progress Note  Psychotherapy Provided [facility]: Individual Psychotherapy 50 minutes provided today  Goals addressed in session:   Goal #1 (See New Treatment Plan, completed today )  D: " I'm frustrated, pretty overwhelmed! Wayne Nance  I didn't get the Tax stuff to our  yet,and now he's even charging us $2500 MORE !"   " Sometimes I just want to escape all of this "   " I do think of Genaro,and of Francesco, though,and somehow I get up each day, but it's very hard '   Pt has a depressed and stressed affect  She has a depressed mood,and she gets escape-ideation, at times  Pt has no evidence of overt SI / HI / AH / VH  Pt does think of her son Edilberto Sethi thinks of her  and her brother,and her sister, in her times of very low mood  Pt continues her Wellbutrin, Paxil,and Xanax, as prescribed,and she says the Wellbutrin does give her somewhat of a boost in energy and motivation, some days  Pt processes her thoughts, feelings,and behaviors  We do her Treatment Plan today  We do cognitive-reframing,and problem-solving  Pt to get her tax info and substantiating data, (from 2010 to present) to her , ASAP, By October 1st or sooner,and give positive self-affirmations when accomplished  She will call her Disability [place], [place], to ask him to try to expedite her Social Security Disability case,as pt and  have SEVERE financial stress, and pt is apparently unable to work at this time  Pt to do exercise, [facility] deep-breathing/ Mindfulness, each day, as part of her Coping strategies  A: Major Depressive Disorder, recurrent, severe, F33 2; Generalized Anxiety Disorder, F41 10; Marital, Z63 0   P: Continue Treatment Plan, Meds,and Psychotherapy  Pain Scale and Suicide Risk [facility]: Current Pain Assessment: no pain   On a scale of 0 to 10, the patient rates current pain at 0   Current suicide risk is low          Behavioral Health Treatment Plan [facility]: Diagnosis and Treatment Plan explained to patient, patient relates understanding diagnosis and is agreeable to Treatment Plan  Assessment    1  Major depressive disorder, recurrent severe without psychotic features (296 33) (F33 2)   2   JOSE ROBERTO (generalized anxiety disorder) (300 02) (F41 1)    Signatures   Electronically signed by : Snehal Espana MSAPRNPMHCNS-BC; Sep 21 2016  6:49PM EST                       (Author)

## 2018-01-12 NOTE — PSYCH
Progress Note  Psychotherapy Provided [facility]: Individual Psychotherapy 50 minutes provided today  Goals addressed in session:   Goal #1  D: " I have a lot of anxiety "   " My  Devora Johnson is upset, because my sister has been at our house since the first week of July, for a month---I guess I feel sorry for her, that's why I encouraged her to stay   "   "My  has tremendous pressure at work---he's been at [employer] for 30 Years, as of this month---But his boss 'rides' him all the time, and the boss has him calling in to the boss every hour, when Devora Johnson is working in the heat, getting the Technical problems assessed,and getting things done"   "Devora Johnson is going to get Psychological testing,and he is starting Counseling ,and I hope this helps him "   " I am very thankful, that I received my first [employer] check---we can pay our bills,and we can have less Financial stress, eventually  Edilberto Reina But we have a lot of stress, with Francesco's autism and his care, and my sister does help me with Heather Rangel, when she is here"   " I still have mountains of paperwork and clutter , at my house---it's never-ending   " (Briefly tearful  )  Edilberto Reina Pt has an anxious, depressed affect  She is briefly tearful for a few moments,as she describes her multiple stressors  She denies SI /HI/AH /VH  Her PHQ9= 12, today  She identifies her stressors, including her worries about her autistic adopted son Heather Rangel, worries about her  Devora Johnson,( who has worked in the [employer] at [employer] for 30 years but who has distractability,and who has very high pressure to increase productivity, from his Management),and worries about her "scattered" sister (who does work as a phone caller for [employer], but she gets no Benefits ) Pt processes her thoughts, feelings,and behaviors  We do Calm, Deep-breathing,and re-focussing on one problem at a time  Cognitive-reframing done   Pt to try to decrease the stress on her , who is the sole provider in her family---> pt will ask her sister to please go back to her own home in [place], for this weekend and onward  Pt and  to try to have a "date night', once every 2 or 3 weeks,and have a  for Francesco---they can afford it, with pt's Social Security Disability income  Pt will keep communications open with her ,and she is helping him to get Psychological Counselling  She will do one "de-cluttering/ organizing" task at her house each day,and give self-affirmation  She is starting exercise/ Yoga at "[employer]", once a week, when [employer] by September  She continues meds as prescribed by Physicians  Active Listening, Support,and Validation also given, in session  A: Major Depressive Disorder, recurrent, F33 2; Generalized Anxiety Disorder, F41 10;and multiple family stressors  P: Continue Treatment Plan, Meds,and Psychotherapy  Pain Scale and Suicide Risk [facility]: Current Pain Assessment: moderate to severe   On a scale of 0 to 10, the patient rates current pain at 1   Current suicide risk is low   [facility]: Diagnosis and Treatment Plan explained to patient, patient relates understanding diagnosis and is agreeable to Treatment Plan  Results/Data  PHQ-9 Adult Depression Screening [id number] 11:08AM Matt Pantoja     Test Name Result Flag Reference   PHQ-9 Adult Depression Score 12     Over the last two weeks, how often have you been bothered by any of the following problems?   Little interest or pleasure in doing things: More than half the days - 2  Feeling down, depressed, or hopeless: More than half the days - 2  Trouble falling or staying asleep, or sleeping too much: More than half the days - 2  Feeling tired or having little energy: More than half the days - 2  Poor appetite or over eating: Several days - 1  Feeling bad about yourself - or that you are a failure or have let yourself or your family down: Several days - 1  Trouble concentrating on things, such as reading the newspaper or watching television: More than half the days - 2  Moving or speaking so slowly that other people could have noticed  Or the opposite -  being so fidgety or restless that you have been moving around a lot more than usual: Not at all - 0  Thoughts that you would be better off dead, or of hurting yourself in some way: Not at all - 0   PHQ-9 Adult Depression Screening Positive     PHQ-9 Difficulty Level Very difficult     PHQ-9 Severity Moderate Depression         Assessment    1  Major depressive disorder, recurrent severe without psychotic features (296 33) (F33 2)   2  JOSE ROBERTO (generalized anxiety disorder) (300 02) (F41 1)   3   Marital conflict (T11 42) (F61 2)    Signatures   Electronically signed by : Shiloh Banuelos MSAPRNPMHCNSKatherineBC; Aug  3 2017  2:24PM EST                       (Author)
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
## 2018-01-12 NOTE — DISCHARGE SUMMARY
Discharge Summary  [facility] Discharge Summary Dorcheikh Needles:   Admission Date: 4/15/16  Patient was referred by Dr Hola Mccluer  Discharge Date: 4/25/16  Pt  request      Diagnosis: Axis I: Generalized Anxiety Disorder(F41 1)  Treating Physician: Dr Mackenzie Dey  Treatment Complications: Tardiness and missed days  Presenting Problem: Ms Cooper Pérez was referred to [facility] by her OP psychiatrist who determined a higher level of care was indicated  Ms Cooper Pérez became overwhelmed with her stressors--financial, son's care taking needs, and status of her social security disability claim--and called to speak to her psychiatrist Dr Hola Mcclure  Ms Cooper Pérez indicated she was suicidal with thoughts to overdose  At that time, Dr Hola Mcclure got the [facility] crisis intervention unit involved and they made a home visit  They did not recommend inpatient admission and Ms oCoper Pérez was referred to [facility]  Course of treatment includes group counseling, pharmacotherapy, individual case management, allied therapy, psychoeducation and psychiatric evaluation  Treatment Progress: Ms Cooepr Pérez attended four (4) days of program  She called out 4/21/16 due to severe allergies, did not attend program on 4/22/16, calling at around 1100 to say she wasn't coming in, and did not attend program on 4/25/16 without any call  During the days she was here, she often presented overwhelmed and scattered, arriving late to program every day  She identified her stressors as financial due to her  going on strike and her care taking responsibilities of their 15 yo autistic son  When Delmi Cardenas was in program, she was typically quiet in groups unless directly prompted and had difficulty identifying what she wanted to do differently in her life when meeting with this writer individually   She never picked up the prescription for Bupropion  mg po daily which was ordered on her first day in program  When this writer called her today the conversation revealed that Wendy Julian viewed this program as more of a stressor than a benefit as it required her to be out of her home for a number of hours that in turn put her behind in her household responsibilities  She denied SI during her stay in program and opted to be discharged today  She was encouraged to keep the appointments in place with her OP psychiatrist and therapist      Aftercare recommendations include    1  Appointment with SINTIA Cool,MS scheduled 5/5/16 @ 11:15 AM [phone] Holley Osgood [place], [address]    2  Appointment with Dr Marcio Coles 6/29/16 @ 2:15 PM [phone] Holley Osgood [place], [address]  3  Pt  may benefit from support group(s) offered by [address] [place] [address] E  Discharge Medications include: See below  Active Problems    1  Abnormal CBC (790 6) (R79 89)   2  Allergic rhinitis (477 9) (J30 9)   3  Asthma (493 90) (J45 909)   4  Asymptomatic menopausal state (V49 81) (Z78 0)   5  Breast cancer screening (V76 10) (Z12 39)   6  Bronchospasm (519 11) (J98 01)   7  Eczema (692 9) (L30 9)   8  Encounter for screening mammogram for malignant neoplasm of breast (V76 12)   (Z12 31)   9  Fatigue (780 79) (R53 83)   10  JOSE ROBERTO (generalized anxiety disorder) (300 02) (F41 1)   11  History of allergy (V15 09) (Z88 9)   12  Hyperlipidemia (272 4) (E78 5)   13  Laboratory examination ordered as part of a routine general medical examination    (V72 62) (Z00 00)   14  Major depressive disorder, recurrent, moderate (296 32) (F33 1)   15  Need for hepatitis B vaccination (V05 3) (Z23)   16  Need for prophylactic vaccination and inoculation against bacterial diseases (V03 9)    (Z23)   17  Need for prophylactic vaccination and inoculation against influenza (V04 81) (Z23)   18  Positive PPD (795 51) (R76 11)   19  Post-menopausal (V49 81) (Z78 0)   20  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)   21   Screening examination for pulmonary tuberculosis (V74 1) (Z11 1)   22  Severe recurrent major depression without psychotic features (296 33) (F33 2)   23  Vitamin D deficiency (268 9) (E55 9)    Past Medical History    1  History of A Fall (E888 9)   2  History of Acute upper respiratory infection (465 9) (J06 9)   3  History of Atelectasis (518 0) (J98 11)   4  History of Contusion Of The Chest Wall With Intact Skin Surface (922 1)   5  History of acute bronchitis (V12 69) (Z87 09)   6  History of acute bronchitis (V12 69) (Z87 09)   7  History of fatigue (V13 89) (Z87 898)   8  Denied: History of head injury   9  History of low back pain (V13 59) (Z87 39)   10  History of low back pain (V13 59) (Z87 39)   11  History of shortness of breath (V13 89) (Z87 898)   12  History of Joint pain, knee (719 46) (M25 569)   13  Need for hepatitis B vaccination (V05 3) (Z23)   14  Need for prophylactic vaccination and inoculation against bacterial diseases (V03 9)    (Z23)   15  Need for prophylactic vaccination and inoculation against influenza (V04 81) (Z23)   16  History of Palpitations (785 1) (R00 2)   17  History of 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)   18  Denied: History of Seizures   19  History of Subconjunctival hemorrhage, unspecified laterality (372 72) (H11 30)    Social History    · Being A Social Drinker   · College graduate   · Marital History - Currently    · Never A Smoker   · No drug use   · Denied: History of Tobacco Use   · Travel / Residence In [place]   · Denied: History of Using Intravenous Drugs   · Work History    Current Meds   1  Advair Diskus 250-50 MCG/DOSE Inhalation Aerosol Powder Breath Activated; 1 PUFF   TWICE DAILY; RINSE MOUTH AFTER USE AS NEEDED; Therapy: 03PMP3997 to (Evaluate:44Sor5588)  Requested for: 21Apr2015; Last   Rx:21Apr2015 Ordered   2  ALPRAZolam 0 5 MG Oral Tablet; Take 1 tablet 3 times daily as needed;    Therapy: 60XBS0323 to (Evaluate:47Qoh2761)  Requested for: 12Apr2016; Last   Rx:02Mar2016; Status: ACTIVE - Renewal Denied Ordered   3  BuPROPion HCl ER (SR) 100 MG Oral Tablet Extended Release 12 Hour; Take 1 tablet   daily; Therapy: 75Iqb9232 to (Evaluate:31Mnm1195)  Requested for: 2016; Last   Rx:2016 Ordered   4  Calcium 1250 MG TABS; TAKE 1 TABLET DAILY; Therapy: 89MIV4706 to Recorded   5  Fluticasone Propionate 50 MCG/ACT Nasal Suspension; USE 2 SPRAYS IN EACH   NOSTRIL ONCE DAILY; Therapy: 08MXO9469 to ()  Requested for: ; Last   Rx:2015 Ordered   6  Multi-Vitamins Oral Tablet; TAKE 1 TABLET DAILY; Therapy: 63YEE9065 to Recorded   7  PARoxetine HCl - 30 MG Oral Tablet; TAKE 1 TABLET IN THE EVENING; Therapy: 52AAD2436 to (Evaluate:62Dkl2711)  Requested for: 46NCG0339; Last   Rx:2016 Ordered   8  ProAir  (90 Base) MCG/ACT Inhalation Aerosol Solution; INHALE 2 PUFFS   EVERY 4 HOURS AS NEEDED FOR COUGH AND WHEEZE;   Therapy: 33GQG8252 to (Evaluate:90Drx3462)  Requested for: 2015; Last   Rx:2015 Ordered   9  Verapamil HCl  MG Oral Capsule Extended Release 24 Hour; TAKE 1 CAPSULE   Weekly; Therapy: 04Lhy2369 to (Evaluate:2015) Recorded   10  Vitamin C 1000 MG Oral Tablet; TAKE 1 TABLET DAILY; Therapy: 23SXX2509 to Recorded   11  Vitamin D3 2000 UNIT Oral Capsule; TAKE 1 CAPSULE Daily; Therapy: 50RBF8039 to (Last Rx:2013) Ordered    Allergies    1   No Known Drug Allergies    Immunizations  Hepatitis B --- Claudine Burgos: 35JGB5440Uazoa Tierney: 43CJG7908; Dillingham Flin67MMO7849   Influenza --- Series1: 63JBS8290   Meningococcal --- Series1: 64JJH7330   MMR --- Series1: [place]   PPD --- Claudine Burgos: 91ETH6083Fduuf Tierney: 37YJQ3518   Tdap --- Series1: 02Rku0749   Tubersol 5 UNIT/0 1ML Intradermal Solution --- Claudine Burgos: 02YCX2570Mpiaj Penner: 24BEI8052     Future Appointments    Date/Time Provider Specialty Site   2016 11:15 AM Jeimy Garcia MS, APRN, [facility]  [facility]   2016 02:15 PM Shirley Alvarado Consuelo Sandifer, M D   Pending sale to [facility]     Signatures   Electronically signed by : Davey Sheth LCSW; Apr 25 2016  3:44PM EST                       (Author)    Electronically signed by : SHANTE Oscar ; Apr 26 2016  8:17AM EST                       (Author)

## 2018-01-12 NOTE — PSYCH
Psych Med Mgmt    Appearance: adequate hygiene and grooming, demonstrated behavior psychomotor retardation and good eye contact  Observed mood: depressed and anxious  Observed mood: affect was blunted  Speech: speech soft and fluent  Thought processes: coherent/organized  Hallucinations: no hallucinations present  Thought Content: no delusions  Abnormal Thoughts: The patient has no suicidal thoughts and no homicidal thoughts  Orientation: The patient is oriented to person, place and time  Recent and Remote Memory: short term memory intact and long term memory intact  Attention Span And Concentration: concentration impaired  Insight: Insight intact  Judgment: Her judgment was intact  Muscle Strength And Tone  Muscle strength and tone were normal  Normal gait and station  Language: no difficulty naming common objects, no difficulty repeating a phrase and no difficulty writing a sentence  Fund of knowledge: Patient displays adequate knowledge of current events, adequate fund of knowledge regarding past history and adequate fund of knowledge regarding vocabulary  The patient is experiencing no localized pain  On a scale of 0 - 10 the pain severity is a 0  Treatment Recommendations: Continue Paxil 40 mg every evening  Off Abilify - does not want to restart  Continue Xanax 0 5 mg tid PRN  Does not want any medication changes at present  Therapy with Poli Nunez  Risks, Benefits And Possible Side Effects Of Medications: Risks, benefits, and possible side effects of medications explained to patient and patient verbalizes understanding  Discussed with patient the risks of sedation, respiratory depression, impairment of ability to drive and potential for abuse and addiction related to treatment with benzodiazepine medications   The patient understands risk of treatment with benzodiazepine medications, agrees to not drive if feels impaired and agrees to take medications as prescribed  The patient has been filling controlled prescriptions on time as prescribed to [facility] [employer] [facility] program       She reports normal appetite, decreased energy, recent 1 lbs weight gain  and increase in number of sleep hours   Janel Cueto she has been feeling slightly less depressed since last visit, despite not taking Abilify "made my heart race"  Glad that her disability was approved  Still has anxiety symptoms on and off  No suicidal or homicidal ideation  No psychotic symptoms  Still reports hypersomnia  Appetite is adequate  No other side effects from medications reported  PHQ-9 is decreased at 20 today (from 23 at the last visit)  Vitals  Signs   Recorded: 30Aug2017 02:17PM   Heart Rate: 89  Systolic: 054  Diastolic: 73  BP Cuff Size: Standard  Height: 5 ft 3 in  Weight: 167 lb   BMI Calculated: 29 58  BSA Calculated: 1 79    Assessment    1  JOSE ROBERTO (generalized anxiety disorder) (300 02) (F41 1)   2  Major depressive disorder, recurrent severe without psychotic features (296 33) (F33 2)    Plan    1  Follow-up visit in 2 months Evaluation and Treatment  Follow-up  Status: Hold For -   Scheduling  Requested for: 98Uuq8987    Review of Systems    Constitutional: recent 1 lb weight gain and feeling tired  Cardiovascular: no complaints of slow or fast heart rate, no chest pain, no palpitations  Respiratory: no complaints of shortness of breath, no wheezing, no dyspnea on exertion  Gastrointestinal: no complaints of abdominal pain, no constipation, no nausea, no diarrhea, no vomiting  Genitourinary: no complaints of dysuria, no incontinence, no pelvic pain, no urinary frequency  Musculoskeletal: no complaints of arthralgia, no myalgias, no limb pain, no joint stiffness  Integumentary: no complaints of skin rash, no itching, no dry skin  Neurological: no complaints of headache, no confusion, no numbness, no dizziness        Past Psychiatric History    Past Psychiatric History: No prior history of inpatient psychiatric treatment  Attended Partial Program in past one time  No history of past suicide attempts  Past medication trials with Paxil, Effexor, Wellbutrin, Xanax  Substance Abuse Hx    Substance Abuse History: Social alcohol use, no recreational drug use  Active Problems    1  Abnormal CBC (790 6) (R79 89)   2  Allergic rhinitis (477 9) (J30 9)   3  Asthma (493 90) (J45 909)   4  Asymptomatic menopausal state (V49 81) (Z78 0)   5  Breast cancer screening (V76 10) (Z12 39)   6  Bronchospasm (519 11) (J98 01)   7  Eczema (692 9) (L30 9)   8  Encounter for screening mammogram for malignant neoplasm of breast (V76 12)   (Z12 31)   9  Fatigue (780 79) (R53 83)   10  JOSE ROBERTO (generalized anxiety disorder) (300 02) (F41 1)   11  History of allergy (V15 09) (Z88 9)   12  Hyperlipidemia (272 4) (E78 5)   13  Laboratory examination ordered as part of a routine general medical examination    (V72 62) (Z00 00)   14  Major depressive disorder, recurrent severe without psychotic features (296 33) (F33 2)   15  Marital conflict (S55 09) (N02 2)   16  Need for hepatitis B vaccination (V05 3) (Z23)   17  Need for prophylactic vaccination and inoculation against bacterial diseases (V03 9)    (Z23)   18  Need for prophylactic vaccination and inoculation against influenza (V04 81) (Z23)   19  Positive PPD (795 51) (R76 11)   20  Post-menopausal (V49 81) (Z78 0)   21  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)   22  Screening examination for pulmonary tuberculosis (V74 1) (Z11 1)   23  Vitamin D deficiency (268 9) (E55 9)    Past Medical History    1  History of A Fall (E888 9)   2  History of Acute upper respiratory infection (465 9) (J06 9)   3  History of Atelectasis (518 0) (J98 11)   4  History of Contusion Of Chest Wall With Intact Skin Surface (922 1)   5  History of acute bronchitis (V12 69) (Z87 09)   6  History of acute bronchitis (V12 69) (Z87 09)   7  History of fatigue (V13 89) (Z87 898)   8  Denied: History of head injury   9  History of low back pain (V13 59) (Z87 39)   10  History of low back pain (V13 59) (Z87 39)   11  History of shortness of breath (V13 89) (Z87 898)   12  History of Joint pain, knee (719 46) (M25 569)   13  Need for hepatitis B vaccination (V05 3) (Z23)   14  Need for prophylactic vaccination and inoculation against bacterial diseases (V03 9)    (Z23)   15  Need for prophylactic vaccination and inoculation against influenza (V04 81) (Z23)   16  History of Palpitations (785 1) (R00 2)   17  History of 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)   18  Denied: History of Seizures   19  History of Subconjunctival hemorrhage, unspecified laterality (372 72) (H11 30)    The active problems and past medical history were reviewed and updated today  Allergies    1  No Known Drug Allergies    Current Meds   1  Advair Diskus 250-50 MCG/DOSE Inhalation Aerosol Powder Breath Activated; 1 PUFF   TWICE DAILY; RINSE MOUTH AFTER USE AS NEEDED; Therapy: 23LIN4369 to (Evaluate:36Pmp9426)  Requested for: 94Uhh8964; Last   Rx:92Qrx7154 Ordered   2  ALPRAZolam 0 5 MG Oral Tablet; Take 1 tablet 3 times daily as needed; Therapy: 32RXK1892 to (Maria E Schneider)  Requested for: 97MJW0487; Last   Rx:63Krd7950 Ordered   3  Calcium 1250 MG TABS; TAKE 1 TABLET DAILY; Therapy: 87TDN1847 to Recorded   4  Fluticasone Propionate 50 MCG/ACT Nasal Suspension; USE 2 SPRAYS IN EACH   NOSTRIL ONCE DAILY; Therapy: 64TFS0892 to (Winston Pineda)  Requested for: 70GZM3106; Last   Rx:13Dkt6789 Ordered   5  Multi-Vitamins Oral Tablet; TAKE 1 TABLET DAILY; Therapy: 77IFU0980 to Recorded   6  PARoxetine HCl - 40 MG Oral Tablet; TAKE 1 TABLET IN THE EVENING; Therapy: 36OWB7200 to (Maria E Schneider)  Requested for: 33MHR4903; Last   Rx:50Fby7986 Ordered   7   ProAir  (90 Base) MCG/ACT Inhalation Aerosol Solution; INHALE 2 PUFFS   EVERY 4 HOURS AS NEEDED FOR COUGH AND WHEEZE;   Therapy: 82IWK9848 to (Evaluate:12Hsg7282)  Requested for: 21Apr2015; Last   Rx:21Apr2015 Ordered   8  Verapamil HCl  MG Oral Capsule Extended Release 24 Hour; TAKE 1 CAPSULE   Weekly; Therapy: 49Oku0013 to (Evaluate:23Jun2015) Recorded   9  Vitamin C 1000 MG Oral Tablet; TAKE 1 TABLET DAILY; Therapy: 68VGQ3837 to Recorded   10  Vitamin D3 2000 UNIT Oral Capsule; TAKE 1 CAPSULE Daily; Therapy: 44JQG9510 to (Last Rx:49Tzd3314) Ordered    The medication list was reviewed and updated today  Family Psych History  Mother    1  No family history of mental disorder  Father    2  No family history of mental disorder  Grandmother    3  Family history of depression (V17 0) (Z81 8)  Family History    4  Family history of Hodgkin Disease   5  Family history of Hypothyroidism   6  Family history of Osteoporosis (V17 81)   7  Family history of Stroke Syndrome (V17 1)    The family history was reviewed and updated today  Social History    · Being A Social Drinker   · College graduate   · Marital conflict (V66 12) (Q49 3)   · Marital History - Currently    · Never A Smoker   · No drug use   · Denied: History of Tobacco Use   · Travel / Residence In [place]   · Denied: History of Using Intravenous Drugs   · Work History  The social history was reviewed and updated today  , lives with  and 15year old adoptive son who is autistic  Unemployed, worked in office management  On disability  Has bachelor's degree in communications  History Of Phys/Sex Abuse Or Perpetration    History Of Phys/Sex Abuse or Perpetration: No history of physical or sexual abuse  End of Encounter Meds    1  Advair Diskus 250-50 MCG/DOSE Inhalation Aerosol Powder Breath Activated; 1 PUFF   TWICE DAILY; RINSE MOUTH AFTER USE AS NEEDED; Therapy: 53RWL1468 to (Evaluate:69Sxf3276)  Requested for: 21Apr2015; Last   Rx:21Apr2015 Ordered   2   Fluticasone Propionate 50 MCG/ACT Nasal Suspension; USE 2 SPRAYS IN EACH   NOSTRIL ONCE DAILY; Therapy: 29YGP1867 to (Stephanie Portillo)  Requested for: 77YBK2076; Last   Rx:12May2015 Ordered    3  ProAir  (90 Base) MCG/ACT Inhalation Aerosol Solution; INHALE 2 PUFFS   EVERY 4 HOURS AS NEEDED FOR COUGH AND WHEEZE;   Therapy: 28QCO9687 to (Evaluate:49Tcn4917)  Requested for: 21Apr2015; Last   Rx:21Apr2015 Ordered    4  ALPRAZolam 0 5 MG Oral Tablet (Xanax); Take 1 tablet 3 times daily as needed; Therapy: 59YXT9015 to (Bobby Galarza)  Requested for: 21UUC5000; Last   Rx:10May2017 Ordered    5  PARoxetine HCl - 40 MG Oral Tablet; TAKE 1 TABLET IN THE EVENING; Therapy: 90UPS4513 to (Bobby Galarza)  Requested for: 14QOD0170; Last   Rx:10May2017 Ordered    6  Calcium 1250 MG TABS; TAKE 1 TABLET DAILY; Therapy: 62DFQ2615 to Recorded   7  Multi-Vitamins Oral Tablet; TAKE 1 TABLET DAILY; Therapy: 80LBP5638 to Recorded   8  Vitamin C 1000 MG Oral Tablet; TAKE 1 TABLET DAILY; Therapy: 86WZX8666 to Recorded    9  Verapamil HCl  MG Oral Capsule Extended Release 24 Hour; TAKE 1 CAPSULE   Weekly; Therapy: 33Bqq4896 to (Evaluate:23Jun2015) Recorded    10  Vitamin D3 2000 UNIT Oral Capsule; TAKE 1 CAPSULE Daily;     Therapy: 73NXA4328 to (Last Rx:25Oct2013) Ordered    Future Appointments    Date/Time Provider Specialty Site   08/31/2017 10:15 AM Theo Ambrocio MS, APRN, [facility]BS  [facility]   09/08/2017 10:15 AM Theo Ambrocio MS, APRN, PMHCNS_BS  [facility]     Signatures   Electronically signed by : SHANTE Daniels ; Aug 30 2017  2:27PM EST                       (Author)

## 2018-01-12 NOTE — PSYCH
Psych Med Mgmt    Appearance: adequate hygiene and grooming, demonstrated behavior psychomotor retardation and poor eye contact  Observed mood: depressed and anxious  Observed mood: affect was blunted  Speech: speech soft  Thought processes: coherent/organized  Hallucinations: no hallucinations present  Abnormal Thoughts: The patient has no suicidal thoughts and no homicidal thoughts  Orientation: The patient is oriented to person, place and time  Recent and Remote Memory: short term memory impaired and long term memory intact  Attention Span And Concentration: concentration impaired  Insight: Insight intact  Judgment: Her judgment was intact  Muscle Strength And Tone  Muscle strength and tone were normal  Normal gait and station  Language: no difficulty naming common objects, no difficulty repeating a phrase and no difficulty writing a sentence  Fund of knowledge: Patient displays adequate knowledge of current events, adequate fund of knowledge regarding past history and adequate fund of knowledge regarding vocabulary  The patient is experiencing no localized pain  Individual Psychotherapy 20 minutes provided today  Goals addressed in session: Counseling provided during the session today  Discussed with patient stressors related to problems with her son and financial issues  Coping skills reviewed  support provided  Treatment Recommendations: Continue Paxil 30 mg every evening  Restart Wellbutrin  mg daily  Continue Xanax 0 5 mg tid PRN  Therapy with Johnson Eddy  Does not want to attend PHP  No records found for benzodiazepine prescriptions according to James Palafox 17      Risks, Benefits And Possible Side Effects Of Medications: Risks, benefits, and possible side effects of medications explained to patient and patient verbalizes understanding       She reports normal appetite, decreased energy, no weight change and increase in number of sleep hours 11  Jason states she still has been feeling depressed and anxious since last visit  Feels stressed out and overwhelmed with ongoing issues with het autistic son and with financial problems  Has poor eye contact, soft speech and psychomotor retardation today  States she has not been able to enjoy things she liked to do in the past   Still has low energy and hypersomnia  No suicidal or homicidal ideation at present  Occasional passive death wish on and off - denies today  Feels safe at home  No psychotic symptoms  Not taking Wellbutrin XL - took it for 1 week only and then stopped "it did not work"  No side effects from medications reported  PHQ-9 is 23 today (25 at the last visit)  Vitals  Signs   Recorded: 68LRE7356 16:83YH   Systolic: 673  Diastolic: 81  Heart Rate: 69  Height: 5 ft 3 in  Weight: 167 lb   BMI Calculated: 29 58  BSA Calculated: 1 79    Assessment    1  JOSE ROBERTO (generalized anxiety disorder) (300 02) (F41 1)   2  Major depressive disorder, recurrent severe without psychotic features (296 33) (F33 2)    Plan    1  ALPRAZolam 0 5 MG Oral Tablet (Xanax); Take 1 tablet 3 times daily as needed    2  PARoxetine HCl - 30 MG Oral Tablet; TAKE 1 TABLET IN THE EVENING   3  Follow-up Visit in 4 Weeks Evaluation and Treatment  Follow-up  Status: Hold For -   Scheduling  Requested for: 02Fum7032    4  BuPROPion HCl ER (XL) 150 MG Oral Tablet Extended Release 24 Hour; TAKE 1   TABLET DAILY    Review of Systems    Constitutional: feeling tired  Cardiovascular: no complaints of slow or fast heart rate, no chest pain, no palpitations  Respiratory: no complaints of shortness of breath, no wheezing, no dyspnea on exertion  Gastrointestinal: no complaints of abdominal pain, no constipation, no nausea, no diarrhea, no vomiting  Genitourinary: no complaints of dysuria, no incontinence, no pelvic pain, no urinary frequency     Musculoskeletal: no complaints of arthralgia, no myalgias, no limb pain, no joint stiffness  Integumentary: no complaints of skin rash, no itching, no dry skin  Neurological: no complaints of headache, no confusion, no numbness, no dizziness  Other Symptoms: allergy symptoms - nasal congestion  Past Psychiatric History    Past Psychiatric History: No prior history of inpatient psychiatric treatment  Attended Partial Program in past one time  No history of past suicide attempts  Substance Abuse Hx    Substance Abuse History: Social alcohol use, no recreational drug use  Active Problems    1  Abnormal CBC (790 6) (R79 89)   2  Allergic rhinitis (477 9) (J30 9)   3  Asthma (493 90) (J45 909)   4  Asymptomatic menopausal state (V49 81) (Z78 0)   5  Breast cancer screening (V76 10) (Z12 39)   6  Bronchospasm (519 11) (J98 01)   7  Eczema (692 9) (L30 9)   8  Encounter for screening mammogram for malignant neoplasm of breast (V76 12)   (Z12 31)   9  Fatigue (780 79) (R53 83)   10  JOSE ROBERTO (generalized anxiety disorder) (300 02) (F41 1)   11  History of allergy (V15 09) (Z88 9)   12  Hyperlipidemia (272 4) (E78 5)   13  Laboratory examination ordered as part of a routine general medical examination    (V72 62) (Z00 00)   14  Major depressive disorder, recurrent severe without psychotic features (296 33) (F33 2)   15  Need for hepatitis B vaccination (V05 3) (Z23)   16  Need for prophylactic vaccination and inoculation against bacterial diseases (V03 9)    (Z23)   17  Need for prophylactic vaccination and inoculation against influenza (V04 81) (Z23)   18  Positive PPD (795 51) (R76 11)   19  Post-menopausal (V49 81) (Z78 0)   20  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)   21  Screening examination for pulmonary tuberculosis (V74 1) (Z11 1)   22  Vitamin D deficiency (268 9) (E55 9)    Past Medical History    1  History of A Fall (E888 9)   2  History of Acute upper respiratory infection (465 9) (J06 9)   3  History of Atelectasis (518 0) (J98 11)   4  History of Contusion Of Chest Wall With Intact Skin Surface (922 1)   5  History of acute bronchitis (V12 69) (Z87 09)   6  History of acute bronchitis (V12 69) (Z87 09)   7  History of fatigue (V13 89) (Z87 898)   8  Denied: History of head injury   9  History of low back pain (V13 59) (Z87 39)   10  History of low back pain (V13 59) (Z87 39)   11  History of shortness of breath (V13 89) (Z87 898)   12  History of Joint pain, knee (719 46) (M25 569)   13  Need for hepatitis B vaccination (V05 3) (Z23)   14  Need for prophylactic vaccination and inoculation against bacterial diseases (V03 9)    (Z23)   15  Need for prophylactic vaccination and inoculation against influenza (V04 81) (Z23)   16  History of Palpitations (785 1) (R00 2)   17  History of 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)   18  Denied: History of Seizures   19  History of Subconjunctival hemorrhage, unspecified laterality (372 72) (H11 30)    The active problems and past medical history were reviewed and updated today  Allergies    1  No Known Drug Allergies    Current Meds   1  Advair Diskus 250-50 MCG/DOSE Inhalation Aerosol Powder Breath Activated; 1 PUFF   TWICE DAILY; RINSE MOUTH AFTER USE AS NEEDED; Therapy: 45RIM1984 to (Evaluate:06Abc7226)  Requested for: 21Apr2015; Last   Rx:21Apr2015 Ordered   2  ALPRAZolam 0 5 MG Oral Tablet; Take 1 tablet 3 times daily as needed; Therapy: 74WEL3590 to (Evaluate:55Qsq0002)  Requested for: 12Apr2016; Last   Rx:02Mar2016; Status: ACTIVE - Renewal Denied Ordered   3  BuPROPion HCl ER (SR) 100 MG Oral Tablet Extended Release 12 Hour; Take 1 tablet   daily; Therapy: 77Ckj7464 to (Evaluate:54Xqs6581)  Requested for: 15Apr2016; Last   Rx:15Apr2016 Ordered   4  BuPROPion HCl ER (XL) 150 MG Oral Tablet Extended Release 24 Hour; TAKE 1   TABLET DAILY; Therapy: 62YAU3077 to (Evaluate:58Zyn9804)  Requested for: 52IOC5658; Last   Rx:29Jun2016 Ordered   5   Calcium 1250 MG TABS; TAKE 1 TABLET DAILY; Therapy: 45PSX0662 to Recorded   6  Fluticasone Propionate 50 MCG/ACT Nasal Suspension; USE 2 SPRAYS IN EACH   NOSTRIL ONCE DAILY; Therapy: 69YNH6246 to (Marcello Medici)  Requested for: 82AVD7985; Last   Rx:65Usq2087 Ordered   7  Multi-Vitamins Oral Tablet; TAKE 1 TABLET DAILY; Therapy: 29WCW9960 to Recorded   8  PARoxetine HCl - 30 MG Oral Tablet; TAKE 1 TABLET IN THE EVENING; Therapy: 70LCL0273 to (Evaluate:53Kbl6137)  Requested for: 39GMV6890; Last   Rx:02Mar2016 Ordered   9  ProAir  (90 Base) MCG/ACT Inhalation Aerosol Solution; INHALE 2 PUFFS   EVERY 4 HOURS AS NEEDED FOR COUGH AND WHEEZE;   Therapy: 34AMM7407 to (Evaluate:15Lav2628)  Requested for: 21Apr2015; Last   Rx:87Bfw2009 Ordered   10  Verapamil HCl  MG Oral Capsule Extended Release 24 Hour; TAKE 1 CAPSULE    Weekly; Therapy: 33Mkg6654 to (Evaluate:13Aqq1178) Recorded   11  Vitamin C 1000 MG Oral Tablet; TAKE 1 TABLET DAILY; Therapy: 30XSY8121 to Recorded   12  Vitamin D3 2000 UNIT Oral Capsule; TAKE 1 CAPSULE Daily; Therapy: 57DQM9821 to (Last Rx:00Stv8236) Ordered    The medication list was reviewed and updated today  Family Psych History  Mother    1  No family history of mental disorder  Father    2  No family history of mental disorder  Grandmother    3  Family history of depression (V17 0) (Z81 8)  Family History    4  Family history of Hodgkin Disease   5  Family history of Hypothyroidism   6  Family history of Osteoporosis (V17 81)   7  Family history of Stroke Syndrome (V17 1)    The family history was reviewed and updated today  Social History    · Being A Social Drinker   · College graduate   · Marital History - Currently    · Never A Smoker   · No drug use   · Denied: History of Tobacco Use   · Travel / Residence In [place]   · Denied: History of Using Intravenous Drugs   · Work History  The social history was reviewed and updated today   The social history was reviewed and is unchanged  , lives with  and 15year old adoptive son who is autistic  Unemployed, worked in office management  Applied for disability  Has bachelor's degree in communications  History Of Phys/Sex Abuse Or Perpetration    History Of Phys/Sex Abuse or Perpetration: No history of physical or sexual abuse  End of Encounter Meds    1  Advair Diskus 250-50 MCG/DOSE Inhalation Aerosol Powder Breath Activated; 1 PUFF   TWICE DAILY; RINSE MOUTH AFTER USE AS NEEDED; Therapy: 27RRA9026 to (Evaluate:45Mey7163)  Requested for: 21Apr2015; Last   Rx:21Apr2015 Ordered   2  Fluticasone Propionate 50 MCG/ACT Nasal Suspension; USE 2 SPRAYS IN EACH   NOSTRIL ONCE DAILY; Therapy: 61CEP4991 to (Hansel Plata)  Requested for: 03IPO9483; Last   Rx:12May2015 Ordered    3  ProAir  (90 Base) MCG/ACT Inhalation Aerosol Solution; INHALE 2 PUFFS   EVERY 4 HOURS AS NEEDED FOR COUGH AND WHEEZE;   Therapy: 13SCJ9649 to (Evaluate:29Tgp1247)  Requested for: 21Apr2015; Last   Rx:21Apr2015 Ordered    4  ALPRAZolam 0 5 MG Oral Tablet (Xanax); Take 1 tablet 3 times daily as needed; Therapy: 41XGA0337 to (Natividad Joy)  Requested for: 63FRP6014; Last   Rx:11Uqi0331 Ordered    5  PARoxetine HCl - 30 MG Oral Tablet; TAKE 1 TABLET IN THE EVENING; Therapy: 45TIY5208 to (Natividad Joy)  Requested for: 07Hoh6472; Last   Rx:63Drs0829; Status: [address] [facility] - Awaiting Verification Ordered    6  Calcium 1250 MG TABS; TAKE 1 TABLET DAILY; Therapy: 23VZH4092 to Recorded   7  Multi-Vitamins Oral Tablet; TAKE 1 TABLET DAILY; Therapy: 14ZSF6423 to Recorded   8  Vitamin C 1000 MG Oral Tablet; TAKE 1 TABLET DAILY; Therapy: 18YWA6874 to Recorded    9  BuPROPion HCl ER (XL) 150 MG Oral Tablet Extended Release 24 Hour; TAKE 1   TABLET DAILY;    Therapy: 61QCF3398 to (Evaluate:05Jan2017)  Requested for: 09Cjx5346; Last   GL:52OOG3152; Status: [address] [facility] - [facility]waiting Verification Ordered    10  Verapamil HCl  MG Oral Capsule Extended Release 24 Hour; TAKE 1 CAPSULE    Weekly; Therapy: 31Cnb2963 to (Evaluate:23Jun2015) Recorded    11  BuPROPion HCl ER (SR) 100 MG Oral Tablet Extended Release 12 Hour; Take 1 tablet    daily; Therapy: 46Nwt0126 to (Evaluate:67Pcl2649)  Requested for: 15Apr2016; Last    Rx:53Zuy0197 Ordered    12  Vitamin D3 2000 UNIT Oral Capsule; TAKE 1 CAPSULE Daily;     Therapy: 94OSM9670 to (Last Rx:25Oct2013) Ordered    Future Appointments    Date/Time Provider Specialty Site   09/21/2016 11:15 AM Cass Batres MS, APRN, PMHCNS_BS  [facility]   10/05/2016 11:15 AM Cass Batres MS, APRN, PMHCNS_BS  [facility]   10/20/2016 11:15 AM Cass Batres MS, APRN, PMHCNS_BS  [facility]   11/04/2016 11:15 AM Cass Batres MS, APRN, PMHCNS_BS  [address] [facility]     Signatures   Electronically signed by : SHANTE Alarcon ; Sep  7 2016  4:44PM EST                       (Author)

## 2018-01-12 NOTE — PSYCH
Progress Note  Psychotherapy Provided [facility]: Individual Psychotherapy 45 minutes provided today  Goals addressed in session:   Goal #1  D: " I pushed myself, Frida Sorto and I, to join a "Connect Group" at [employer], for  couples  Renetta Marrufo I think it will help ease the social isolation I've been feeling   "   " But , my depression stays with me   my motivation is very low, my energy is spent, I'm so tired   ' " I did stay up until 2am last night, to watch a special show on "[facility] the [facility]", re: women and people in [facility]---I'll try to get better rest"   " I'm still NOT organized, and I procrastinate, I didn't get the documents to the  that he needs , yet   we are about 6 yrs  behind in taxes, unfortunately  Renetta Marrufo it's overwhelming   "    Pt has an anxious, depressed, affect and mood  Pt has no evidence of any SI / HI / Druscilla Moritz / VH  But she gets overwhelmed feelings, and some hopeless feelings, some days  Pt has low energy, low motivation, has procrastination, and she has variable sleep patterns, has guilt feelings re: "not being organized", and guilt feelings re: not getting important documents to her Tax Robert Jeffries in a timely way  Pt processes her thoughts, feelings, and behaviors  Pt is assisted with deep-breathing, cognitive-reframing, and with a step-by step approach to getting the "Tax thing off my plate!" Pt to focus ONLY on finding what documents she can, in their house, until this Friday---> then giving whatever she has, to the , to have him do his end of the job  Pt finds increased social and spiritual support at their new [employer] group for  couples---This Therapist gives her affirmation for same, and pt is giving a self-affirmation today  She continues Paxil 30mg per day, no side effects  Pt to try to increase her exercise, after she finds the tax documents and brings them to her      A: Major Depressive Disorder, recurrent, severe, F33 2; Generalized Anxiety Disorder, F41 1; financial and marital stressors  P: Continue Treatment Plan, Meds, psychotherapy, and pt to get their Tax Documents to their , as she and Dayday Stubbs agreed, as Dayday Stubbs is working long hours providing for the family at this time  Pain Scale and Suicide Risk [facility]: Current Pain Assessment: no pain   On a scale of 0 to 10, the patient rates current pain at 0   Current suicide risk is low   Behavioral Health Treatment Plan [facility]: Diagnosis and Treatment Plan explained to patient, patient relates understanding diagnosis and is agreeable to Treatment Plan            Signatures   Electronically signed by : Johnson Eddy MSAPRNPMHCNS-BC; Feb 17 2016  1:03PM EST                       (Author)

## 2018-01-12 NOTE — PSYCH
Progress Note  Psychotherapy Provided [facility]: Individual Psychotherapy 50 minutes provided today  Goals addressed in session:   Goal #1  D: " I somehow made it, through 5 WEEKS of having company at our house! I'm exhausted "   " We put them on the plane yesterday morning, my Auntie and my cousin  Debora Organ Robbins Organ Robbins Organ They were bickering,and in competition with each other, baking things, the whole time they were here---I still have whole cakes they made, to put into the freezer! Robbins Organ Robbins Organ Robbins Organ I'm relieved to see them go, but my sister and my brother are still at my house today---They will leave today  "  Robbins Organ Robbins Organ "Allison Cyrus will have his house back,and so will I!"   " I have no energy, no motivation, to get done what I HAVE to get done"   "My goal today is to clear off our bed, clean and straighten my bedroom---it's the least I can do---My  deserves to have a clean, uncluttered bed to sleep in, after his long days at work "   Pt [employer]) has an exhausted, moderately anxious affect, and moderately depressed mood  She expresses relief, but feeling exhausted, after hosting her Aunt &cousin from the [facility] for 5 weeks, plus having them and her brother and sister for Thanksgiving and weekend ---pt and  finally will have their home to themselves and to son Reynaldo Mccullough  Pt denies any SI/HI/AH/VH  Her PHQ9= 13, moderate depression  She processes her thoughts, feelings,and behaviors  She does Cognitive-reframing, re: her survivor strengths,and her ability to be generous and care for others---but sometimes it's to the detriment of her mental health  We Role-play how to say "NO", in the future---she will set limits,and consult with , BEFORE taking-on company,and they together will limit the house-guests to 4 to 5 days, max   She and  discussed these issues at their [facility]---pt states her group reinforced these same problem-solving suggestions with her and Allison Bridges, and she will use the "No" word with others, more often, for her physical and mental health's sake,and for the sake of her marriage  Her mini-goal is to clear-off her bed, clean the linens, make the bed,and clean her bedroom up, today  A: Major Depressive Disorder, recurrent, moderate, F33 1; Generalized Anxiety Disorder, F41 10; need for increased communications with ,and assertiveness with others  P: Continue Treament Plan, meds,and Psychotherapy  Pt to complete the mini-goals she set above  Pain Scale and Suicide Risk [facility]: Current Pain Assessment: no pain   On a scale of 0 to 10, the patient rates current pain at 0   Current suicide risk is low   Behavioral Health Treatment Plan [facility]: Diagnosis and Treatment Plan explained to patient, patient relates understanding diagnosis and is agreeable to Treatment Plan  Results/Data  PHQ-9 Adult Depression Screening 29Nov2017 12:09PM Akbar Patel     Test Name Result Flag Reference   PHQ-9 Adult Depression Score 13     Over the last two weeks, how often have you been bothered by any of the following problems? Little interest or pleasure in doing things: More than half the days - 2  Feeling down, depressed, or hopeless: More than half the days - 2  Trouble falling or staying asleep, or sleeping too much: Nearly every day - 3  Feeling tired or having little energy: More than half the days - 2  Poor appetite or over eating: Not at all - 0  Feeling bad about yourself - or that you are a failure or have let yourself or your family down: More than half the days - 2  Trouble concentrating on things, such as reading the newspaper or watching television: More than half the days - 2  Moving or speaking so slowly that other people could have noticed   Or the opposite -  being so fidgety or restless that you have been moving around a lot more than usual: Not at all - 0  Thoughts that you would be better off dead, or of hurting yourself in some way: Not at all - 0   PHQ-9 Adult Depression Screening Positive     PHQ-9 Difficulty Level Very difficult     PHQ-9 Severity Moderate Depression         Assessment    1  Major depressive disorder, recurrent episode, moderate with anxious distress (296 32)   (F33 1)   2  JOSE ROBERTO (generalized anxiety disorder) (300 02) (F41 1)   3   Marital problem (V61 10) (Z63 0)    Signatures   Electronically signed by : ELISABET Kyle; Nov 29 2017 12:47PM EST                       (Author)    Electronically signed by : ELISABET Kyle; Nov 29 2017 12:47PM EST                       (Author)

## 2018-01-12 NOTE — PSYCH
Message  Message Free Text Note Form: Pt called and CANCELLED today's Psychotherapy joe't for 11:15am, due to Insurance reasons---she has no insurance at this time, she reported to   Pt stated she will resume Psychotherapy when she has Insurance re-instated  PT IS DISCHARGED at this time, in the interim  --Darline Simmichelle Cabrera, MS, APRN, PMHCNS      Active Problems    1  Abnormal CBC (790 6) (R79 89)   2  Allergic rhinitis (477 9) (J30 9)   3  Asthma (493 90) (J45 909)   4  Asymptomatic menopausal state (V49 81) (Z78 0)   5  Breast cancer screening (V76 10) (Z12 39)   6  Bronchospasm (519 11) (J98 01)   7  Eczema (692 9) (L30 9)   8  Encounter for screening mammogram for malignant neoplasm of breast (V76 12)   (Z12 31)   9  Fatigue (780 79) (R53 83)   10  JOSE ROBERTO (generalized anxiety disorder) (300 02) (F41 1)   11  History of allergy (V15 09) (Z88 9)   12  Hyperlipidemia (272 4) (E78 5)   13  Laboratory examination ordered as part of a routine general medical examination    (V72 62) (Z00 00)   14  Major depressive disorder, recurrent, moderate (296 32) (F33 1)   15  Need for hepatitis B vaccination (V05 3) (Z23)   16  Need for prophylactic vaccination and inoculation against bacterial diseases (V03 9)    (Z23)   17  Need for prophylactic vaccination and inoculation against influenza (V04 81) (Z23)   18  Positive PPD (795 51) (R76 11)   19  Post-menopausal (V49 81) (Z78 0)   20  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)   21  Screening examination for pulmonary tuberculosis (V74 1) (Z11 1)   22  Severe recurrent major depression without psychotic features (296 33) (F33 2)   23  Vitamin D deficiency (268 9) (E55 9)    Current Meds   1  Advair Diskus 250-50 MCG/DOSE Inhalation Aerosol Powder Breath Activated; 1 PUFF   TWICE DAILY; RINSE MOUTH AFTER USE AS NEEDED; Therapy: 41QBH2016 to (Evaluate:26Qtp1490)  Requested for: 21Apr2015; Last   Rx:21Apr2015 Ordered   2  ALPRAZolam 0 5 MG Oral Tablet (Xanax);  Take 1 tablet 3 times daily as needed; Therapy: 05SKE1335 to (Evaluate:03Osb5602)  Requested for: 12Apr2016; Last   Rx:02Mar2016; Status: ACTIVE - Renewal Denied Ordered   3  BuPROPion HCl ER (SR) 100 MG Oral Tablet Extended Release 12 Hour; Take 1 tablet   daily; Therapy: 12Igz6019 to (Evaluate:60Gdr3798)  Requested for: 15Obi8688; Last   Rx:15Apr2016 Ordered   4  Calcium 1250 MG TABS; TAKE 1 TABLET DAILY; Therapy: 14UCA7465 to Recorded   5  Fluticasone Propionate 50 MCG/ACT Nasal Suspension; USE 2 SPRAYS IN EACH   NOSTRIL ONCE DAILY; Therapy: 16HRV3027 to (Gemma Brookhaven)  Requested for: 26RFM1361; Last   Rx:12May2015 Ordered   6  Multi-Vitamins Oral Tablet; TAKE 1 TABLET DAILY; Therapy: 50BEN7472 to Recorded   7  PARoxetine HCl - 30 MG Oral Tablet; TAKE 1 TABLET IN THE EVENING; Therapy: 87PSO1350 to (Evaluate:31Phy2014)  Requested for: 67LPZ4454; Last   Rx:02Mar2016 Ordered   8  ProAir  (90 Base) MCG/ACT Inhalation Aerosol Solution; INHALE 2 PUFFS   EVERY 4 HOURS AS NEEDED FOR COUGH AND WHEEZE;   Therapy: 02ENS7465 to (Evaluate:96Icn9407)  Requested for: 21Apr2015; Last   Rx:21Apr2015 Ordered   9  Verapamil HCl  MG Oral Capsule Extended Release 24 Hour; TAKE 1 CAPSULE   Weekly; Therapy: 09Aac8549 to (Evaluate:23Jun2015) Recorded   10  Vitamin C 1000 MG Oral Tablet; TAKE 1 TABLET DAILY; Therapy: 81XGE6655 to Recorded   11  Vitamin D3 2000 UNIT Oral Capsule; TAKE 1 CAPSULE Daily; Therapy: 81PZK3452 to (Last Rx:25Oct2013) Ordered    Allergies    1   No Known Drug Allergies    Signatures   Electronically signed by : TAMARA CoolMHCNS-BC; May  5 2016  7:49PM EST                       (Author)

## 2018-01-13 NOTE — PSYCH
Progress Note  Psychotherapy Provided [facility]: Individual Psychotherapy 50 minutes provided today  Goals addressed in session:   Goal #1  D: " I feel physically exhausted! "   " We've had Company all month---My Karlene Ndiaye is a very negative person,and my cousin, both from the [facility] [place]note 2[place] where the [place] was--And, I'm cooking for them, making them feel comfortable, driving them everywhere, plus getting ready for a big Thanksgiving dinner"   " Plus, my  is SO under pressure at his Job, they even threaten to fire him, because he's been there for 30 years and they don't want senior people at [facility] [employer] anymore ---But the younger people come in for a while and then they quit---So, What did the company gain?"   "I really feel for Genaro---I'm not going to do this to him again, all this company at our house---he needs some peace, too!"   Pt Nicolas Mohamud) has a very fatigued affect,and a depressed mood  She doesn't feel organized at home; doesn't feel as though she is getting things done---> We do Cognitive-reframing---> she is actually taking care of her Autistic son, plus cooking for and entertaining 2 houseguests all month, including having a feast for Thanksgiving  Pt  denies any SI /HI/AH/VH  PHQ9= 15, moderately severe depression  She gets variable sleep---she's up late at night, when it is quiet in the house, and then she gets Jadyn Christiano ready for school early in am's ,and then she needs sleep in the early day, but her company keeps her up all day right now  Pt processes her thoughts, feelings,and behaviors  Pt and  Shayna Chester) to communicate,and "get on the same page", re: limit-setting on houseguests  The houseguests will take their flight back to [place] Nov 28---pt will ask her Auntie's son to help with them, too, as he lives right near-by to pt    Pt to NOT host a big Guys meal,she says, after doing all of this for Thanksgiving   We review calm/Deep-Breathing/ relaxation,and pt to try to get on a better Sleep/ wake circadian rhythm  continues meds, no side effects  A: Major Depressive Disorder, recurrent, moderate, F33 2; Generalized Anxiety Disorder, F41 10; family stress  P: Continue Treatment Plan, Meds,and Psychotherapy  Be assertive---set limits on others, as needed  Pain Scale and Suicide Risk [facility]: Current Pain Assessment: no pain   On a scale of 0 to 10, the patient rates current pain at 0   Current suicide risk is low   Behavioral Health Treatment Plan Noa Saranelvis: Diagnosis and Treatment Plan explained to patient, patient relates understanding diagnosis and is agreeable to Treatment Plan  Results/Data  PHQ-9 Adult Depression Screening [id number] 12:03PM Batsheva Sommers     Test Name Result Flag Reference   PHQ-9 Adult Depression Score 15     Over the last two weeks, how often have you been bothered by any of the following problems? Little interest or pleasure in doing things: More than half the days - 2  Feeling down, depressed, or hopeless: More than half the days - 2  Trouble falling or staying asleep, or sleeping too much: More than half the days - 2  Feeling tired or having little energy: Nearly every day - 3  Poor appetite or over eating: Several days - 1  Feeling bad about yourself - or that you are a failure or have let yourself or your family down: More than half the days - 2  Trouble concentrating on things, such as reading the newspaper or watching television: Nearly every day - 3  Moving or speaking so slowly that other people could have noticed   Or the opposite -  being so fidgety or restless that you have been moving around a lot more than usual: Not at all - 0  Thoughts that you would be better off dead, or of hurting yourself in some way: Not at all - 0   PHQ-9 Adult Depression Screening Positive     PHQ-9 Difficulty Level Very difficult     PHQ-9 Severity      Moderately Severe Depression       Signatures   Electronically signed by : ELISABET Sorensen; Nov 15 2017 12:33PM EST                       (Author)    Electronically signed by : Renee Berrios, MSAPRNPMHCNS-BC; Nov 15 2017 12:33PM EST                       (Author)

## 2018-01-13 NOTE — PSYCH
Treatment Plan Tracking    #1 Treatment Plan not completed within required time limits due to: Client cancelled/ no-showed scheduled appointment , Other: No Show for 7/19/16             Signatures   Electronically signed by : TAMARA MatuteMHCNS-BC; Jul 19 2016 11:03AM EST                       (Author)

## 2018-01-13 NOTE — PSYCH
Progress Note  Psychotherapy Provided [facility]: Individual Psychotherapy 45 minutes provided today  Goals addressed in session:   Goal #1  D: " I don't feel well at all today  Adriana Skipper Adriana Skipper I saw my Family Physician, Dr Chema Mistry today, and I have a serious sinus infection  Adriana Skipper Adriana Skipper I'm run-down, no energy, I had to sleep until 1pm the other day"   " I do get Debbie Wagner ready for school every school day, but he fights me on every thing I do, and it's a struggle   " "This year, I Ihave to do something for ME, I know i can't stay in this rut!"   Pt has a depressed, fatigued affect  She is congested, and has a cough and a diagnosed sinus infection---she did see her family physician Dr Jarret Guaman, today  Pt is starting antibiotics and Symbicort inhaler, when she gets home  Pt has a depressed mood, and the Paxil 30 mg doesn't seem to be giving her the mood-boost she needs  Pt to speak with Dr Ria Hagen re: this, at next visit  Pt denies SI / HI /AH / VH  She has low energy, low motivation  Pt processes her thoughts, feelings, and behaviors  Pt is assisted with positive cognitive reframing  Pt's goal this week is to find and get important back-tax information to her  by this Friday, and not procrastinate any further  Pt to also increase some social connections this year, "get out of her rut re: "Only Autistic Care for Son"  Pt to attend her [employer]EV[employer] annual meeting this year  Pt to give one positive self-affirmation each day  PHQ-9 completed--see chart,  A: Major Depressive Disorder, recurrent, severe, F33 2; Gen  Anxiety Disorder, F41 1; lack of social interactions beyond immediate family  P: Continue Treatment plan, pt to talk to psychiatrist re; possible other antidepressant?, and pt to make "Time for Me", increased social interactions ,this year of 2016  Pain Scale and Suicide Risk [facility]: Current Pain Assessment: moderate to severe   On a scale of 0 to 10, the patient rates current pain at 1   Current suicide risk is low   Behavioral Health Treatment Plan Jaylan Singh: Diagnosis and Treatment Plan explained to patient, patient relates understanding diagnosis and is agreeable to Treatment Plan  Results/Data  Encounter Results   PHQ-9 Adult Depression Screening 20Jan2016 01:57PM Jesus Bean     Test Name Result Flag Reference   PHQ-9 Adult Depression Score 12     Q1: 2, Q2: 2, Q3: 1, Q4: 2, Q5: 1, Q6: 2, Q7: 2, Q8: 0, Q9: 0   PHQ-9 Adult Depression Screening Positive     PHQ-9 Difficulty Level Very difficult     PHQ-9 Severity Moderate Depression         Assessment    1  Major depressive disorder, recurrent episode, moderate (296 32) (F33 1)   2   JOSE ROBERTO (generalized anxiety disorder) (300 02) (F41 1)    Signatures   Electronically signed by : Dillon Hernández MSAPRNPMHCNS-BC; Jan 20 2016  2:34PM EST                       (Author)

## 2018-01-13 NOTE — PSYCH
Provider Comments  Provider Comments:   Pt was No Show for 1:15pm Therapy joe't today, so this Therapist attempted to call her---her phone apparently busy, or Voice message box is full  --MT      Signatures   Electronically signed by : Latricia Valencia MSAPRNPMHCNS-BC; Jun 1 2017  1:47PM EST                       (Author)

## 2018-01-13 NOTE — PSYCH
Assessment    1  JOSE ROBERTO (generalized anxiety disorder) (300 02) (F41 1)   2  Severe recurrent major depression without psychotic features (296 33) (F33 2)   3  [address]    1  BuPROPion HCl ER (SR) 100 MG Oral Tablet Extended Release 12 Hour; TAKE 1   TABLET Daily    [facility] Physician's Orders  ADMIT TO: Partial Hospitalization 5 x per week for 15 days  Vital signs routine  Diet: regular  Group Psychotherapy 9 x per week    Allied Therapy Group 6 x per week     Diagnosis: F 41 1, F 33 2  Medications: As per medication list     âI certify that the continuation of Partial Hospitalization services is medically necessary to improve and/or maintain the patient's condition and functional level, and to prevent relapse or hospitalization, and that this could not be done at a less intensive level of care  â     Physician Signature: Dale Walton MD     Nurse Signature: Roverto Gee RN      Chief Complaint  This is a 54year-old female with history of anxiety and depression presented for evaluation because she feels very anxious and depressed  History of Present Illness  This is a 54year-old female with history of anxiety and depression came referred by her therapist after she called Dr Loretta Roe and asked if she can take and extra pill because she was feeling very depressed l and anxious, she sleep too much and poor concentration  She worries because her  is the only source of income in the house and they are on strike , she is unemployed ,applied for disability and was denied  She has an adoptive son who is autistic and that worries her  Today she feels hopeless, feels very depressed, denies suicidal thoughts, plan or intent, denies any homicidal thoughts, denies any psychotic symptoms  Her PHQ-9 is 19  Review of Systems  depression, sleep disturbances and decreased functioning ability     Constitutional: No fever, no chills, no recent weight gain or recent weight loss    ENT: no ear ache, no loss of hearing, no nosebleeds or nasal discharge, no sore throat or hoarseness  Cardiovascular: no complaints of slow or fast heart rate, no chest pain, no palpitations, no leg claudication or lower extremity edema  Respiratory: no complaints of shortness of breath, no wheezing, no dyspnea on exertion, no orthopnea or PND  Gastrointestinal: no complaints of abdominal pain, no constipation, no nausea or diarrhea, no vomiting, no bloody stools  Genitourinary: no complaints of dysuria, no incontinence, no pelvic pain, no dysmenorrhea, no vaginal discharge or abnormal vaginal bleeding  Musculoskeletal: no complaints of arthralgia, no myalgia, no joint swelling or stiffness, no limb pain or swelling  Integumentary: no complaints of skin rash or lesion, no itching or dry skin, no skin wounds  Neurological: no complaints of headache, no confusion, no numbness or tingling, no dizziness or fainting  Other Symptoms: endocrine is negative  ROS reviewed  Past Psychiatric History    Past Psychiatric History: She has history of anxiety and depression  denies any psychiatric impatient admissions, she was on [facility] several years ago  She follows up with Richie Sarmiento and Dr Josh Goldsmith  She denies any history of suicidal attempt or violent behavior  Substance Abuse Hx    Substance Abuse History: She drinks socially, denies drugs or tobacco           Active Problems    1  Abnormal CBC (790 6) (R79 89)   2  Allergic rhinitis (477 9) (J30 9)   3  Asthma (493 90) (J45 909)   4  Asymptomatic menopausal state (V49 81) (Z78 0)   5  Breast cancer screening (V76 10) (Z12 39)   6  Bronchospasm (519 11) (J98 01)   7  Eczema (692 9) (L30 9)   8  Encounter for screening mammogram for malignant neoplasm of breast (V76 12)   (Z12 31)   9  Fatigue (780 79) (R53 83)   10  JOSE ROBERTO (generalized anxiety disorder) (300 02) (F41 1)   11  History of allergy (V15 09) (Z88 9)   12   Hyperlipidemia (272 4) (E78 5)   13  Laboratory examination ordered as part of a routine general medical examination    (V72 62) (Z00 00)   14  Major depressive disorder, recurrent, moderate (296 32) (F33 1)   15  Need for hepatitis B vaccination (V05 3) (Z23)   16  Need for prophylactic vaccination and inoculation against bacterial diseases (V03 9)    (Z23)   17  Need for prophylactic vaccination and inoculation against influenza (V04 81) (Z23)   18  Positive PPD (795 51) (R76 11)   19  Post-menopausal (V49 81) (Z78 0)   20  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)   21  Screening examination for pulmonary tuberculosis (V74 1) (Z11 1)   22  Severe recurrent major depression without psychotic features (296 33) (F33 2)   23  Vitamin D deficiency (268 9) (E55 9)    Past Medical History    1  History of A Fall (E888 9)   2  History of Acute upper respiratory infection (465 9) (J06 9)   3  History of Atelectasis (518 0) (J98 11)   4  History of Contusion Of The Chest Wall With Intact Skin Surface (922 1)   5  History of acute bronchitis (V12 69) (Z87 09)   6  History of acute bronchitis (V12 69) (Z87 09)   7  History of fatigue (V13 89) (Z87 898)   8  Denied: History of head injury   9  History of low back pain (V13 59) (Z87 39)   10  History of low back pain (V13 59) (Z87 39)   11  History of shortness of breath (V13 89) (Z87 898)   12  History of Joint pain, knee (719 46) (M25 569)   13  Need for hepatitis B vaccination (V05 3) (Z23)   14  Need for prophylactic vaccination and inoculation against bacterial diseases (V03 9)    (Z23)   15  Need for prophylactic vaccination and inoculation against influenza (V04 81) (Z23)   16  History of Palpitations (785 1) (R00 2)   17  History of 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)   18  Denied: History of Seizures   19  History of Subconjunctival hemorrhage, unspecified laterality (372 72) (H11 30)    The active problems and past medical history were reviewed and updated today  Surgical History    The surgical history was reviewed and updated today  Allergies    1  No Known Drug Allergies    Current Meds   1  Advair Diskus 250-50 MCG/DOSE Inhalation Aerosol Powder Breath Activated; 1 PUFF   TWICE DAILY; RINSE MOUTH AFTER USE AS NEEDED; Therapy: 01HAC1151 to (Evaluate:97Rvq0140)  Requested for: 21Apr2015; Last   Rx:21Apr2015 Ordered   2  ALPRAZolam 0 5 MG Oral Tablet; Take 1 tablet 3 times daily as needed; Therapy: 72SIS4292 to (Evaluate:85Mmw4693)  Requested for: 12Apr2016; Last   Rx:02Mar2016; Status: ACTIVE - Renewal Denied Ordered   3  Calcium 1250 MG TABS; TAKE 1 TABLET DAILY; Therapy: 95CIK3617 to Recorded   4  Fluticasone Propionate 50 MCG/ACT Nasal Suspension; USE 2 SPRAYS IN EACH   NOSTRIL ONCE DAILY; Therapy: 18WFK6673 to (Nas James)  Requested for: 32NPX2446; Last   Rx:12May2015 Ordered   5  Multi-Vitamins Oral Tablet; TAKE 1 TABLET DAILY; Therapy: 94VHM6524 to Recorded   6  PARoxetine HCl - 30 MG Oral Tablet; TAKE 1 TABLET IN THE EVENING; Therapy: 39NPZ9723 to (Evaluate:12Cbd1879)  Requested for: 19TLW8834; Last   Rx:02Mar2016 Ordered   7  ProAir  (90 Base) MCG/ACT Inhalation Aerosol Solution; INHALE 2 PUFFS   EVERY 4 HOURS AS NEEDED FOR COUGH AND WHEEZE;   Therapy: 07OUE8362 to (Evaluate:98Bmi9854)  Requested for: 21Apr2015; Last   Rx:21Apr2015 Ordered   8  Verapamil HCl  MG Oral Capsule Extended Release 24 Hour; TAKE 1 CAPSULE   Weekly; Therapy: 86Wkn9693 to (Evaluate:23Jun2015) Recorded   9  Vitamin C 1000 MG Oral Tablet; TAKE 1 TABLET DAILY; Therapy: 74BQP5821 to Recorded   10  Vitamin D3 2000 UNIT Oral Capsule; TAKE 1 CAPSULE Daily; Therapy: 94DUA5402 to (Last Rx:25Oct2013) Ordered    The medication list was reviewed and updated today  Family Psych History    1  No family history of mental disorder    2  No family history of mental disorder    3  Family history of depression (V17 0) (Z81 8)    4   Family history of Hodgkin Disease   5  Family history of Hypothyroidism   6  Family history of Osteoporosis (V17 81)   7  Family history of Stroke Syndrome (V17 1)  Positive for depression in her grandmother  The family history was reviewed and updated today  Social History    · Being A Social Drinker   · College graduate   · Marital History - Currently    · Never A Smoker   · No drug use   · Denied: History of Tobacco Use   · Travel / Residence In [place]   · Denied: History of Using Intravenous Drugs   · Work History  The social history was reviewed and updated today  The patient is , lives with her  and adoptive son, unemployed, has a [employer] education   No  history, no legal issues  History Of Phys/Sex Abuse Or Perpetration    History Of Phys/Sex Abuse or Perpetration: She denies any history of physical abuse or history of sexual abuse  Vitals  Signs [Data Includes: Current Encounter]   Recorded: 15Apr2016 10:17AM   Temperature: 97 4 F, Oral  Heart Rate: 73, R Radial  Pulse Quality: Normal, R Radial  Respiration: 16  Respiration Quality: Normal  Systolic: 119, RUE, Sitting  Diastolic: 83, RUE, Sitting  Height: 5 ft 3 in  Weight: 166 lb 8 oz  BMI Calculated: 29 49  BSA Calculated: 1 79    Physical Exam    Appearance: was calm and cooperative, adequate hygiene and grooming and poor eye contact  Observed mood: depressed  Observed mood: affect was constricted  Speech: decreased volume and slowed  Thought processes: coherent/organized  Hallucinations: no hallucinations present  Thought Content: no delusions  Abnormal Thoughts: The patient has death wish, but no homicidal thoughts  Orientation: The patient is oriented to person, place and time  Recent and Remote Memory: short term memory intact and long term memory intact  Attention Span And Concentration: concentration impaired  Insight: Limited insight  Judgment: Her judgment was limited     Muscle Strength And Tone  Muscle strength and tone were normal  Normal gait and station  Language: no difficulty naming common objects, no difficulty repeating a phrase and no difficulty writing a sentence  Fund of knowledge: Patient displays adequate knowledge of current events, adequate fund of knowledge regarding past history and adequate fund of knowledge regarding vocabulary  The patient is experiencing no localized pain  On a scale of 0 - 10 the pain severity is a 0  Treatment Recommendations: 1  Admit to [place] 2  Medication Management 3  Group Therapy  Risks, Benefits And Possible Side Effects Of Medications: Risks, benefits, and possible side effects of medications explained to patient and patient verbalizes understanding  DSM    Provisional Diagnosis: Major Depression Recurrent severe without psychotic symptoms    Generalized Anxiety Disorder     End of Encounter Meds    1  Advair Diskus 250-50 MCG/DOSE Inhalation Aerosol Powder Breath Activated; 1 PUFF   TWICE DAILY; RINSE MOUTH AFTER USE AS NEEDED; Therapy: 87IRK8688 to (Evaluate:36Uen9077)  Requested for: 21Apr2015; Last   Rx:21Apr2015 Ordered   2  Fluticasone Propionate 50 MCG/ACT Nasal Suspension; USE 2 SPRAYS IN EACH   NOSTRIL ONCE DAILY; Therapy: 99NVT1930 to (Bryant Lopez)  Requested for: 55YIO6910; Last   Rx:12May2015 Ordered    3  ProAir  (90 Base) MCG/ACT Inhalation Aerosol Solution; INHALE 2 PUFFS   EVERY 4 HOURS AS NEEDED FOR COUGH AND WHEEZE;   Therapy: 35WHD9031 to (Evaluate:38Uou1976)  Requested for: 21Apr2015; Last   Rx:21Apr2015 Ordered    4  ALPRAZolam 0 5 MG Oral Tablet (Xanax); Take 1 tablet 3 times daily as needed; Therapy: 74QXD0248 to (Evaluate:47Yrz4457)  Requested for: 12Apr2016; Last   Rx:02Mar2016; Status: ACTIVE - Renewal Denied Ordered    5  PARoxetine HCl - 30 MG Oral Tablet; TAKE 1 TABLET IN THE EVENING;    Therapy: 41ZZJ1795 to (Evaluate:87Biz9354)  Requested for: 91OIK4330; Last   Rx:02Mar2016 Ordered    6  Calcium 1250 MG TABS; TAKE 1 TABLET DAILY; Therapy: 74YNV2823 to Recorded   7  Multi-Vitamins Oral Tablet; TAKE 1 TABLET DAILY; Therapy: 76JAR0755 to Recorded   8  Vitamin C 1000 MG Oral Tablet; TAKE 1 TABLET DAILY; Therapy: 46ZZP8809 to Recorded    9  Verapamil HCl  MG Oral Capsule Extended Release 24 Hour; TAKE 1 CAPSULE   Weekly; Therapy: 76Drh1082 to (Evaluate:23Jun2015) Recorded    10  BuPROPion HCl ER (SR) 100 MG Oral Tablet Extended Release 12 Hour; TAKE 1    TABLET Daily; Therapy: 62Duk0144 to (Evaluate:05Zws1086); Last Rx:28Sdm0311 Ordered    11  Vitamin D3 2000 UNIT Oral Capsule; TAKE 1 CAPSULE Daily; Therapy: 82XUL6870 to (Last Rx:25Oct2013) Ordered    Future Appointments    Date/Time Provider Specialty Site   06/29/2016 02:15 PM SHANTE Dai   Psychiatry [facility]   05/05/2016 11:15 AM Guy Cabrera MS, APRN, PMHCNS_BS  [facility]     Signatures   Electronically signed by : SHANTE Garcia ; Apr 15 2016 10:40AM EST                       (Author)    Electronically signed by : Steven Roberto RN; Apr 15 2016 10:51AM EST                       (Author)    Electronically signed by : SHANTE Garcia ; Apr 15 2016 10:59AM EST                       (Author)

## 2018-01-14 VITALS
WEIGHT: 163 LBS | DIASTOLIC BLOOD PRESSURE: 75 MMHG | BODY MASS INDEX: 28.88 KG/M2 | HEIGHT: 63 IN | SYSTOLIC BLOOD PRESSURE: 109 MMHG | HEART RATE: 75 BPM

## 2018-01-14 VITALS
HEART RATE: 82 BPM | WEIGHT: 172 LBS | DIASTOLIC BLOOD PRESSURE: 77 MMHG | HEIGHT: 63 IN | BODY MASS INDEX: 30.48 KG/M2 | SYSTOLIC BLOOD PRESSURE: 114 MMHG

## 2018-01-14 VITALS
BODY MASS INDEX: 28.92 KG/M2 | WEIGHT: 163.2 LBS | DIASTOLIC BLOOD PRESSURE: 82 MMHG | HEIGHT: 63 IN | HEART RATE: 66 BPM | SYSTOLIC BLOOD PRESSURE: 126 MMHG

## 2018-01-14 NOTE — PSYCH
Progress Note  Psychotherapy Provided [facility]: Individual Psychotherapy 45 minutes provided today  Goals addressed in session:   Goal #1  D: " I can't get anything done, like, about our taxes and info and bank statements to our   Sri Hook I put my energy, ANY energy I have, into Francesco he's at day camp right now "   " I'm depressed, tired, I'm overwhelmed   "   " I DID apply for Social Security Disability, but no word from my [address] [facility] yet, about a Hearing date---No, I haven't called him "   " I feel guilty, terrible, that I'm a burden to my  Genaro---he's working so hard, back at [employer], now, after the lay-off, but I'M NOT contributing $$, to get us out of the hole!"   Pt has a depressed, low-energy affect,and a depressed mood  She gets hopeless thoughts,and overwhelmed feelings, often  They have severe financial stress, living on one income, with a Special Needs autistic adopted son Raissa Begum)  Pt denies Kadeem Moreno / Noman Kothari / Fred Fraser / JOSE  Pt processes her stressors  Processes her thoughts, feelings,and behaviors  Pt did go to [employer] for 4 to 5 days, but it was of minimal help, apparently  Pt is assisted with cognitive reframing  Pt states she will have increased motivation, energy,and some self -esteem, IF she is financially contributing to the household again, she feels  Pt could possibly do a part-time job, in between her son's care and programs for his Autism, but she has little energy or motivation to tackle anything other than her basic duties in the household(cooking, some cleaning, financial digging-out of documents for , Francesco's care and the paperwork for multiple Services, Medications, her medical joe'ts, and she tries to give emotional support to  Law Valentin  Pt to call her ,and ask him to try to expedite her Court Hearing date, if at all possible   Pt didn't set limits on her aunt (Dad's sister, from [facility], where pt's extended family does have a home    )---so the aunt is planning to stay at pt's & Genaro's house August 12 to 31st > pt states, " It's too much, I can't do it, and we can't afford it!" Pt to talk to the aunt,and try to have her sty 1 or 2 days, but stay with other relatives the rest of the time  Pt continues meds as prescribed by Dr Wagner Bazan  We review deep-breathing,and positive visualization re; pt's inner strengths, also  Prayer helps her, too  A: Major Depressive Disorder, recurrent, severe, F33 2; Generalized Anxiety Disorder, F41 1,and severe financial stress,and family stress  P: Continue Treatment Plan, Meds,and psychotherapy  Pt to prioritize talking to her [address] then getting the bank statements and documents to her , to get it out of her hands and into his, as the back-taxes are only accumulating, until the matter gets resolved ASAP  pt and  do not want to lose their house, but they possibly could, if things are left undone, pt states  Pt to be assertive with her maternal Aunt from the [facility], too---Aunt has to find some alternative accommodations among all the relatives, when she visits Aug 12 to 31st, 2016  Pain Scale and Suicide Risk [facility]: Current Pain Assessment: no pain   On a scale of 0 to 10, the patient rates current pain at 0   Current suicide risk is low   Behavioral Health Treatment Plan [facility]: Diagnosis and Treatment Plan explained to patient, patient relates understanding diagnosis and is agreeable to Treatment Plan  Assessment    1  Major depressive disorder, recurrent severe without psychotic features (296 33) (F33 2)   2   JOSE ROBERTO (generalized anxiety disorder) (300 02) (F41 1)    Signatures   Electronically signed by : [facility] , MSAPRNPMHCNS-BC; Aug  3 2016  7:22PM EST                       (Author)    Electronically signed by : [facility] , MSAPRNPMHCNS-BC; Aug  3 2016  7:22PM EST                       (Author)

## 2018-01-14 NOTE — PSYCH
Psych Med Mgmt    Appearance: adequate hygiene and grooming, demonstrated behavior psychomotor retardation and poor eye contact  Observed mood: depressed and anxious  Observed mood: affect was blunted  Speech: slowed, but speech soft  Thought processes: coherent/organized  Hallucinations: no hallucinations present  Thought Content: no delusions  Abnormal Thoughts: The patient has no suicidal thoughts and no homicidal thoughts  Orientation: The patient is oriented to person, place and time  Recent and Remote Memory: short term memory intact and long term memory intact  Attention Span And Concentration: concentration impaired  Insight: Insight intact  Judgment: Her judgment was intact  Muscle Strength And Tone  Muscle strength and tone were normal  Normal gait and station  Language: no difficulty naming common objects, no difficulty repeating a phrase and no difficulty writing a sentence  Fund of knowledge: Patient displays adequate knowledge of current events, adequate fund of knowledge regarding past history and adequate fund of knowledge regarding vocabulary  The patient is experiencing moderate to severe pain  On a scale of 0 - 10 the pain severity is a 5  Individual Psychotherapy 20 minutes provided today  Goals addressed in session: Counseling provided during the session today  Discussed with patient coping with financial issues and taking care of her autistic plan  Coping skills reviewed with patient  Patient is considering starting exercise program  Support provided  Treatment Recommendations: Continue Paxil 30 mg every evening  Increase Wellbutrin XL to 450 mg daily - patient has been taking only 300 mg of Wellbutrin XL per day  Continue Xanax 0 5 mg tid PRN  Therapy with Hilarie Duverney           Risks, Benefits And Possible Side Effects Of Medications: Risks, benefits, and possible side effects of medications explained to patient and patient verbalizes understanding  Discussed with patient the risks of sedation, respiratory depression, impairment of ability to drive and potential for abuse and addiction related to treatment with benzodiazepine medications  The patient understands risk of treatment with benzodiazepine medications, agrees to not drive if feels impaired and agrees to take medications as prescribed  The patient has been filling controlled prescriptions on time as prescribed to [employer] program       She reports normal appetite, decreased energy, recent 8 lbs weight loss and increase in number of sleep hours 10  Kj Roberts states she continues to experience depressive symptoms and anxiety symptoms "nothing has changed"  Still stressed out by financial issues and daily problems with taking care of autistic son  Feels hopeless at times  No suicidal or homicidal ideation  No psychotic symptoms  Continues to have hypersomnia  Appetite is slightly decreased, lost weight since last visit  No side effects from medications reported  PHQ-9 is remains at 24 today (same as at the last visit)  Vitals  Signs   Recorded: 56Gnk3518 02:48PM   Heart Rate: 75  Systolic: 562  Diastolic: 75  BP Cuff Size: Large  Height: 5 ft 3 in  Weight: 163 lb   BMI Calculated: 28 87  BSA Calculated: 1 77    Assessment    1  JOSE ROBERTO (generalized anxiety disorder) (300 02) (F41 1)   2  Major depressive disorder, recurrent severe without psychotic features (296 33) (F33 2)    Plan    1  ALPRAZolam 0 5 MG Oral Tablet (Xanax); Take 1 tablet 3 times daily as needed; Do Not Fill Before: 78PNL1524    2  PARoxetine HCl - 30 MG Oral Tablet; TAKE 1 TABLET IN THE EVENING   3  Follow-up visit in 5 weeks Evaluation and Treatment  Follow-up  Status: Hold For -   Scheduling  Requested for: 25ODQ2707    Review of Systems    Constitutional: feeling tired and recent 8 lb weight loss     Cardiovascular: no complaints of slow or fast heart rate, no chest pain, no palpitations  Respiratory: no complaints of shortness of breath, no wheezing, no dyspnea on exertion  Gastrointestinal: no complaints of abdominal pain, no constipation, no nausea, no diarrhea, no vomiting  Genitourinary: no complaints of dysuria, no incontinence, no pelvic pain, no urinary frequency  Musculoskeletal: knee pain  Integumentary: no complaints of skin rash, no itching, no dry skin  Neurological: no complaints of headache, no confusion, no numbness, no dizziness  Past Psychiatric History    Past Psychiatric History: No prior history of inpatient psychiatric treatment  Attended Partial Program in past one time  No history of past suicide attempts  Substance Abuse Hx    Substance Abuse History: Social alcohol use, no recreational drug use  Active Problems    1  Abnormal CBC (790 6) (R79 89)   2  Allergic rhinitis (477 9) (J30 9)   3  Asthma (493 90) (J45 909)   4  Asymptomatic menopausal state (V49 81) (Z78 0)   5  Breast cancer screening (V76 10) (Z12 39)   6  Bronchospasm (519 11) (J98 01)   7  Eczema (692 9) (L30 9)   8  Encounter for screening mammogram for malignant neoplasm of breast (V76 12)   (Z12 31)   9  Fatigue (780 79) (R53 83)   10  JOSE ROBERTO (generalized anxiety disorder) (300 02) (F41 1)   11  History of allergy (V15 09) (Z88 9)   12  Hyperlipidemia (272 4) (E78 5)   13  Laboratory examination ordered as part of a routine general medical examination    (V72 62) (Z00 00)   14  Major depressive disorder, recurrent severe without psychotic features (296 33) (F33 2)   15  Marital conflict (L74 65) (Q37 2)   16  Need for hepatitis B vaccination (V05 3) (Z23)   17  Need for prophylactic vaccination and inoculation against bacterial diseases (V03 9)    (Z23)   18  Need for prophylactic vaccination and inoculation against influenza (V04 81) (Z23)   19  Positive PPD (795 51) (R76 11)   20  Post-menopausal (V49 81) (Z78 0)   21   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)   22  Screening examination for pulmonary tuberculosis (V74 1) (Z11 1)   23  Vitamin D deficiency (268 9) (E55 9)    Past Medical History    1  History of A Fall (E888 9)   2  History of Acute upper respiratory infection (465 9) (J06 9)   3  History of Atelectasis (518 0) (J98 11)   4  History of Contusion Of Chest Wall With Intact Skin Surface (922 1)   5  History of acute bronchitis (V12 69) (Z87 09)   6  History of acute bronchitis (V12 69) (Z87 09)   7  History of fatigue (V13 89) (Z87 898)   8  Denied: History of head injury   9  History of low back pain (V13 59) (Z87 39)   10  History of low back pain (V13 59) (Z87 39)   11  History of shortness of breath (V13 89) (Z87 898)   12  History of Joint pain, knee (719 46) (M25 569)   13  Need for hepatitis B vaccination (V05 3) (Z23)   14  Need for prophylactic vaccination and inoculation against bacterial diseases (V03 9)    (Z23)   15  Need for prophylactic vaccination and inoculation against influenza (V04 81) (Z23)   16  History of Palpitations (785 1) (R00 2)   17  History of 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)   18  Denied: History of Seizures   19  History of Subconjunctival hemorrhage, unspecified laterality (372 72) (H11 30)    The active problems and past medical history were reviewed and updated today  Allergies    1  No Known Drug Allergies    Current Meds   1  Advair Diskus 250-50 MCG/DOSE Inhalation Aerosol Powder Breath Activated; 1 PUFF   TWICE DAILY; RINSE MOUTH AFTER USE AS NEEDED; Therapy: 97VQN3202 to (Evaluate:96Qog6170)  Requested for: 53Ggx3626; Last   Rx:53Emv2380 Ordered   2  ALPRAZolam 0 5 MG Oral Tablet; Take 1 tablet 3 times daily as needed; Therapy: 69KPQ4008 to (Nieves Rodriguez)  Requested for: 28Iwr9261; Last   Rx:59Wdh6037; Status: ACTIVE - Renewal Denied Ordered   3  BuPROPion HCl ER (XL) 150 MG Oral Tablet Extended Release 24 Hour; TAKE 3   TABLET Daily;    Therapy: 33XJD2532 to (Letty Sparks)  Requested for: 01YQT9003; Last   Rx:05Jan2017 Ordered   4  Calcium 1250 MG TABS; TAKE 1 TABLET DAILY; Therapy: 64ONH6962 to Recorded   5  Fluticasone Propionate 50 MCG/ACT Nasal Suspension; USE 2 SPRAYS IN EACH   NOSTRIL ONCE DAILY; Therapy: 04GHI1921 to (Demetria Chanr)  Requested for: 18ISH4952; Last   Rx:98Lva8837 Ordered   6  Multi-Vitamins Oral Tablet; TAKE 1 TABLET DAILY; Therapy: 19JUF0518 to Recorded   7  PARoxetine HCl - 30 MG Oral Tablet; TAKE 1 TABLET IN THE EVENING; Therapy: 38FOI0437 to (Rojelio Knife)  Requested for: 63QTV7420; Last   Rx:29Nfo5751 Ordered   8  ProAir  (90 Base) MCG/ACT Inhalation Aerosol Solution; INHALE 2 PUFFS   EVERY 4 HOURS AS NEEDED FOR COUGH AND WHEEZE;   Therapy: 48YML2123 to (Evaluate:89Ncj7666)  Requested for: 13Hey7108; Last   Rx:74Oaq5515 Ordered   9  Verapamil HCl  MG Oral Capsule Extended Release 24 Hour; TAKE 1 CAPSULE   Weekly; Therapy: 35Vnh9400 to (Evaluate:13Fdp7540) Recorded   10  Vitamin C 1000 MG Oral Tablet; TAKE 1 TABLET DAILY; Therapy: 05EYV5158 to Recorded   11  Vitamin D3 2000 UNIT Oral Capsule; TAKE 1 CAPSULE Daily; Therapy: 47GSF3310 to (Last Rx:06Eoi0566) Ordered    The medication list was reviewed and updated today  Family Psych History  Mother    1  No family history of mental disorder  Father    2  No family history of mental disorder  Grandmother    3  Family history of depression (V17 0) (Z81 8)  Family History    4  Family history of Hodgkin Disease   5  Family history of Hypothyroidism   6  Family history of Osteoporosis (V17 81)   7  Family history of Stroke Syndrome (V17 1)    The family history was reviewed and updated today         Social History    · Being A Social Drinker   · College graduate   · Marital conflict (I07 50) (W48 3)   · Marital History - Currently    · Never A Smoker   · No drug use   · Denied: History of Tobacco Use   · Travel / Residence In [place]   · Denied: History of Using Intravenous Drugs   · Work History  The social history was reviewed and updated today  The social history was reviewed and is unchanged  , lives with  and 15year old adoptive son who is autistic  Unemployed, worked in office management  Applied for disability  Has bachelor's degree in communications  History Of Phys/Sex Abuse Or Perpetration    History Of Phys/Sex Abuse or Perpetration: No history of physical or sexual abuse  End of Encounter Meds    1  Advair Diskus 250-50 MCG/DOSE Inhalation Aerosol Powder Breath Activated; 1 PUFF   TWICE DAILY; RINSE MOUTH AFTER USE AS NEEDED; Therapy: 22POB5915 to (Evaluate:26Kag5834)  Requested for: 24Opq6941; Last   Rx:00Vwo2096 Ordered   2  Fluticasone Propionate 50 MCG/ACT Nasal Suspension; USE 2 SPRAYS IN EACH   NOSTRIL ONCE DAILY; Therapy: 31HAS2522 to (Greg Chapin)  Requested for: 40AGW1671; Last   Rx:89Fjl3183 Ordered    3  ProAir  (90 Base) MCG/ACT Inhalation Aerosol Solution; INHALE 2 PUFFS   EVERY 4 HOURS AS NEEDED FOR COUGH AND WHEEZE;   Therapy: 13UEA7236 to (Evaluate:67Zjl2757)  Requested for: 94Lig2178; Last   Rx:91Gte4328 Ordered    4  ALPRAZolam 0 5 MG Oral Tablet (Xanax); Take 1 tablet 3 times daily as needed; Therapy: 55YCD6260 to ([phone])  Requested for: 37GZE3506; Last   Rx:97Fen3031 Ordered    5  PARoxetine HCl - 30 MG Oral Tablet; TAKE 1 TABLET IN THE EVENING; Therapy: 83KGK2067 to (Evaluate:63Dai3225)  Requested for: 42RJS7907; Last   Rx:59Cew9709 Ordered    6  Calcium 1250 MG TABS; TAKE 1 TABLET DAILY; Therapy: 21CEN3121 to Recorded   7  Multi-Vitamins Oral Tablet; TAKE 1 TABLET DAILY; Therapy: 47YSI0547 to Recorded   8  Vitamin C 1000 MG Oral Tablet; TAKE 1 TABLET DAILY; Therapy: 34WQU3573 to Recorded    9  BuPROPion HCl ER (XL) 150 MG Oral Tablet Extended Release 24 Hour; TAKE 3   TABLET Daily;    Therapy: 61BVO0328 to (Zuleika Quick)  Requested for: 89YKS4335; Last   ZM:72CPZ4701 Ordered    10  Verapamil HCl  MG Oral Capsule Extended Release 24 Hour; TAKE 1 CAPSULE    Weekly; Therapy: 33Yep1287 to (Evaluate:23Jun2015) Recorded    11  Vitamin D3 2000 UNIT Oral Capsule; TAKE 1 CAPSULE Daily;     Therapy: 99BUR3781 to (Last Rx:25Oct2013) Ordered    Future Appointments    Date/Time Provider Specialty Site   03/01/2017 04:15 PM Karina Yuen MS, APRN, PMHCNS_BS  [facility]   03/15/2017 02:15 PM Karina Yuen MS, APRN, PMHCNS_BS  [facility]   03/29/2017 01:15 PM Karina Yuen MS, APRN, PMHCNS_BS  [facility]   04/12/2017 11:15 AM Karina Yuen MS, APRN, PMHCNS_BS  [facility]     Signatures   Electronically signed by : SHANTE Vela ; Feb 24 2017  3:02PM EST                       (Author)

## 2018-01-14 NOTE — PSYCH
Progress Note  Psychotherapy Provided [facility]: Individual Psychotherapy 50 minutes provided today  Goals addressed in session:   Goal #1  D: " I'm not feeling well  "  Anita Mai " Have a bad cold, or sinuses, or even allergies, I'm so congested , and TIRED, every day "   " I did finish antibiotics , but I'm still run-down   "    " And, I'm behind on getting back- tax info to our   Anita Blend Anita Blend We owe serious back-taxes, but I can't find any more financial statements or documents to give him; Pepper Pro and I had a meeting with the  on Tuesday, and I DID give him what I compiled from the past year, though"   " Our Financial situation is POOR, and that adds so much stress to Sarina Browning and to me  Anita Blend Anita Blend Sarina Pro works so hard, but we can't catch up'   "I really can't concentrate, or even have the motivation or organization, to Work, unfortunately    Anita Blend Anita Blend I used to be such a good worker at [employer], but they just about killed me, towards the end, and now Jacobo has my  doing such CRAZY hours , then on call, and emergency call-ins at the drop of a hat"  Anita Blend Anita Blend "He can't breathe well right now, and we found out that he has to get large nasal polyps removed from his nose and sinus, in surgery, next week "  Anita Blend  "I need to organize myself--- I have paperwork everywhere, in piles, at my house---after I get the Tax stuff to the Tax [address] to focus on Francesco's paperwork for his Autistic programs in the summer---I have to fill out several pages for both the [facility]", and also the [facility] program"   "This all makes my depression worse, when I think and wonder if any of this really makes a difference, for Francesco's future?" (tearful)    Pt has an anxious, moderately depressed affect and mood   Pt is intermittently tearful, as she identifies her multiple stressors, including Severe Financial stress, ( Low income/ one earner, Debt Consolidation, Several Years of Back Taxes, and pt's inability to work outside the home at this time, due to depression/anxiety and her Autistic adopted son Francesco's needs ) Pt denies current SI / HI / Anaid Rae / VH  Her sleep is poor, as she worries into late hours at night  Pt has low energy, low motivation, and difficulty concentrating on completion of necessary tasks  Pt continues Paxil 30mg, and occasional Alprazolam  Pt processes her thoughts, feelings, and behaviors  We problem-solve how to prioritize what she needs to get done this week and next week: Pt to get whatever she CAN find, re: financials/ statements, to her  ASAP, hopefully by Friday night this week---then put it in their 's hands, as they paid him a $2500 Retainer to do this work  Pt to give self-affirmation, each day,and do calm, deep-breathing  Then ,next week, pt to complete the forms for [employer], as pt does not have the energy or stamina to provide for his 24/7 supervision by herself, and her  works long hours  Pt is assisted with cognitive-reframing  Pt to try to build more social supports for herself---she is aware of the [facility] and family programs,and she is in touch with a friend Renee Stapleton , who is also raising a child with these issues  I give pt info, re: possible future [place] Employers who are hiring persons with Autism, and pt is given support and hope, re: this  Pt to give her  support and help, re: recovery from his ENT surgery next week  They also attend Couples Meetings on Thursday nights at [place]  Pt's Sorority sisters from college want her to come to monthly meeting---I encourage pt to do this, for herself / well-being, and additional fun, humor, social support  She will try next month, after current pressures are somewhat relieved  A: Major Depressive Disorder, recurrent, moderate, F33 1; Generalized Anxiety Disorder, F41 1, medical conditions, and severe Financial stressors, and stressors re: severely Autistic son    P: Continue Treatment Plan, Medications, Psychotherapy , and completion of one needed task at a time, then positive self-affirmation  Continue deep-breathing, and continued cognitive-reframing re : her strengths, plus her Verle Christiansen is a big component of her life --pt is thankful for her blessings in her life  Increase social supports---I encourage her to reconnect with Sorority sisters at a monthly meeting, or at least call one or two of them on the phone  Pain Scale and Suicide Risk [facility]: Current Pain Assessment: moderate to severe   On a scale of 0 to 10, the patient rates current pain at 1   Current suicide risk is low   Behavioral Health Treatment Plan [facility]: Diagnosis and Treatment Plan explained to patient, patient relates understanding diagnosis and is agreeable to Treatment Plan  Assessment    1  Major depressive disorder, recurrent, moderate (296 32) (F33 1)   2  JOSE ROBERTO (generalized anxiety disorder) (300 02) (F41 1)   3  Fatigue (780 79) (R53 83)   4  Allergic rhinitis (477 9) (J30 9)   5   Asthma (493 90) (J45 909)    Signatures   Electronically signed by : Lacy Urbano MSAPRNPMHCNS-BC; Mar  3 2016 12:29PM EST                       (Author)

## 2018-01-14 NOTE — MISCELLANEOUS
Message  Pt did not arrive for her 11:15am joe't today, so this Therapist called her   Walter Turner pt said she CANCELLED due to not feeling well today  Support is given on the phone  Pt has upcoming joe'ts scheduled already    -Nuris Garcia MS, APRN, PMHCNS      Active Problems    1  Abnormal CBC (790 6) (R79 89)   2  Allergic rhinitis (477 9) (J30 9)   3  Asthma (493 90) (J45 909)   4  Asymptomatic menopausal state (V49 81) (Z78 0)   5  Breast cancer screening (V76 10) (Z12 39)   6  Bronchospasm (519 11) (J98 01)   7  Depression (311) (F32 9)   8  Eczema (692 9) (L30 9)   9  Encounter for screening mammogram for malignant neoplasm of breast (V76 12)   (Z12 31)   10  Fatigue (780 79) (R53 83)   11  JOSE ROBERTO (generalized anxiety disorder) (300 02) (F41 1)   12  History of allergy (V15 09) (Z88 9)   13  Hyperlipidemia (272 4) (E78 5)   14  Laboratory examination ordered as part of a routine general medical examination    (V72 62) (Z00 00)   15  Major depressive disorder, recurrent episode, moderate (296 32) (F33 1)   16  Need for hepatitis B vaccination (V05 3) (Z23)   17  Need for prophylactic vaccination and inoculation against bacterial diseases (V03 9)    (Z23)   18  Need for prophylactic vaccination and inoculation against influenza (V04 81) (Z23)   19  Positive PPD (795 51) (R76 11)   20  Post-menopausal (V49 81) (Z78 0)   21  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)   22  Screening examination for pulmonary tuberculosis (V74 1) (Z11 1)   23  Severe recurrent major depression without psychotic features (296 33) (F33 2)   24  Vitamin D deficiency (268 9) (E55 9)    Current Meds   1  Advair Diskus 250-50 MCG/DOSE Inhalation Aerosol Powder Breath Activated; 1 PUFF   TWICE DAILY; RINSE MOUTH AFTER USE AS NEEDED; Therapy: 52KGV1679 to (Evaluate:77Eln4467)  Requested for: 21Apr2015; Last   Rx:21Apr2015 Ordered   2  ALPRAZolam 0 5 MG Oral Tablet (Xanax); Take 1 tablet 3 times daily as needed;    Therapy: 80OOG7367 to (Evaluate:12Mar2016)  Requested for: 15BWX1668; Last   Rx:28Gwo7560; Status: ACTIVE - Renewal Denied Ordered   3  Calcium 1250 MG TABS; TAKE 1 TABLET DAILY; Therapy: 94FQL4099 to Recorded   4  Fluticasone Propionate 50 MCG/ACT Nasal Suspension; USE 2 SPRAYS IN EACH   NOSTRIL ONCE DAILY; Therapy: 85OAK6372 to (Kendall Mireles)  Requested for: 49PMF2798; Last   Rx:15Kol4321 Ordered   5  Multi-Vitamins Oral Tablet; TAKE 1 TABLET DAILY; Therapy: 70WXW6419 to Recorded   6  PARoxetine HCl - 30 MG Oral Tablet; TAKE 1 TABLET DAILY; Therapy: 95UTV9469 to (Evaluate:13Mar2016)  Requested for: 28Okd8419; Last   Rx:91Gpl6343 Ordered   7  ProAir  (90 Base) MCG/ACT Inhalation Aerosol Solution; INHALE 2 PUFFS   EVERY 4 HOURS AS NEEDED FOR COUGH AND WHEEZE;   Therapy: 84NOA3568 to (Evaluate:34Pge5347)  Requested for: 21Apr2015; Last   Rx:74Opm3739 Ordered   8  Verapamil HCl  MG Oral Capsule Extended Release 24 Hour; TAKE 1 CAPSULE   Weekly; Therapy: 66Abs0374 to (Evaluate:23Jun2015) Recorded   9  Vitamin C 1000 MG Oral Tablet; TAKE 1 TABLET DAILY; Therapy: 18KWH0309 to Recorded   10  Vitamin D3 2000 UNIT Oral Capsule; TAKE 1 CAPSULE Daily; Therapy: 17KBA1471 to (Last Rx:37Xlo0154) Ordered    Allergies    1   No Known Drug Allergies    Signatures   Electronically signed by : Jacque Morley MSAPRNPMHCNARIADNE; Feb  3 2016 12:10PM EST                       (Author)

## 2018-01-15 NOTE — PSYCH
Date of Initial Treatment Plan: (Chart not available  )  Date of Current Treatment Plan: 4 /12/17  Treatment Plan 11  Strengths/Personal Resources for Self Care: I am trying to provide care for my Autistic adopted son Anil Lopez, 15, and care for myself  I worked 23 years at [employer]; left in 2008 during downsizing  My Demaris Colder is a strength  I appreciate the work my  does, but most of Francesco's care (and the stress) falls on me  Diagnosis:   Axis I: Major Depressive Disorder, Recurrent, severe, F33 2; Generalized Anxiety Disorder, F41  10  Axis II: Deferred  Axis III: Mitral valve prolapse; hx of palpitations  Area of Needs: Financial Stress, my Depression, and stress regarding [employer] 6 Term Goals:   I will have better Finances, a brighter mood, have less isolation, and will have solid plans for Francesco's transition to [employer]  Target Date: 8 / 12/ 17  Short Term Objectives:   Goal 1:   I participate in Psychotherapy,and I take meds as prescribed by my Psychiatrist  In Therapy, I identify my stressors, especially Financial stress, and the stress of raising Anil Lopez  I process my thoughts, feelings,and behaviors  At home, I get up by 7am on Francesco's schooldays, and I get him ready for school ,and make sure he's on the bus, or I drive him to school, every school day  I care for Anil Lopez after school, also,and have him in [facility]  I am trying to do Cognitive-reframing, focussing on my strengths,and what I CAN do, instead of automatic -negative thoughts  For my finances, I had a meeting with my [facility] [place] [facility] I gave him information about my current status---I have a Hearing in June  If I am severely anxious, and feeling overwhelmed, I do Positive Coping steps, such as : Calm, Deep-breathing, I distract myself by going to another room or outside for a few minutes and Breathing, I take a Xanax  5mg if absolutely needed , to avoid palpitations and panic, and then I try to complete one goal per day  I will give one self-affirmation each day  For plans for Francesco, I am preparing for his Transition to [facility] [employer] [facility] [employer] I'm preparing for his IEP Meeting at his school on April 20, 2017, and planning his Summer structured activities  My [employer] also helps me to cope--- My  Vin Zelaya, plus son Francesco,and I attend [facility] once to twice a month  My  and I participate in Couples Group at [facility] weekly, and this has been very supportive  Target Date: 8 /12/ 16  GOAL 1: Modality: Individual 2 to 3 x per month Target Date: 8 /12/ 17  GOAL 1: Modality: Group Completed [facility] twice  Also, Weekly Couples Group at [place]  x per month   GOAL 1: Modality: Couples 4, at [facility]  x per month   GOAL 1: Modality: Family  has been to family sessions here, prn  x per month   GOAL 1: Modality: Medication Management 1 x per month Target Date: Ongoing  The person(s) responsible for carrying out the plan is Client: Demetrice Mcbride; Therapist : Melissa Haines; Psychiatrist: Dr Brian Rodriguez  The first scheduled review date is 8 /12/ 16  The expected length of service is 3 to 4 more months       Level of functioning at initial assessment: 50  The highest level of functioning in the past year was 54  The current level of functioning is 55  CLIENT COMMENTS / Please share your thoughts, feelings, need and/or experiences regarding your treatment plan: _____________________________________________________________________________________________________________________________________________________________________________________________________________________________________________________________________________________________________________________ Date/Time: ______________     Patient Signature: _________________________________ Date/Time: ______________        1  Abnormal CBC (790 6) (R79 89)   2  Allergic rhinitis (477 9) (J30 9)   3   Asthma (493 90) (J45 909)   4  Asymptomatic menopausal state (V49 81) (Z78 0)   5  Breast cancer screening (V76 10) (Z12 39)   6  Bronchospasm (519 11) (J98 01)   7  Eczema (692 9) (L30 9)   8  Encounter for screening mammogram for malignant neoplasm of breast (V76 12)   (Z12 31)   9  Fatigue (780 79) (R53 83)   10  JOSE ROBERTO (generalized anxiety disorder) (300 02) (F41 1)   11  History of allergy (V15 09) (Z88 9)   12  Hyperlipidemia (272 4) (E78 5)   13  Laboratory examination ordered as part of a routine general medical examination    (V72 62) (Z00 00)   14  Major depressive disorder, recurrent severe without psychotic features (296 33) (F33 2)   15  Marital conflict (A70 63) (D50 5)   16  Need for hepatitis B vaccination (V05 3) (Z23)   17  Need for prophylactic vaccination and inoculation against bacterial diseases (V03 9)    (Z23)   18  Need for prophylactic vaccination and inoculation against influenza (V04 81) (Z23)   19  Positive PPD (795 51) (R76 11)   20  Post-menopausal (V49 81) (Z78 0)   21  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)   22  Screening examination for pulmonary tuberculosis (V74 1) (Z11 1)   23  Vitamin D deficiency (268 9) (E55 9)     Electronically signed by : Rufino Fraire MSAPRNPMHCNSKatherineBC;  Apr 12 2017 12:23PM EST                       (Author)

## 2018-01-15 NOTE — PSYCH
Message  Message Free Text Note Form: This Therapist called pt again this afternoon,and left a phone message that I had an opening at 3:15pm today, if she would like to still come in today (she had missed her 1:15pm joe't today)--->pt did not call back  Will keep her upcoming scheduled joe'ts  as planned  -MT      Active Problems    1  Abnormal CBC (790 6) (R79 89)   2  Allergic rhinitis (477 9) (J30 9)   3  Asthma (493 90) (J45 909)   4  Asymptomatic menopausal state (V49 81) (Z78 0)   5  Breast cancer screening (V76 10) (Z12 39)   6  Bronchospasm (519 11) (J98 01)   7  Eczema (692 9) (L30 9)   8  Encounter for screening mammogram for malignant neoplasm of breast (V76 12)   (Z12 31)   9  Fatigue (780 79) (R53 83)   10  JOSE ROBERTO (generalized anxiety disorder) (300 02) (F41 1)   11  History of allergy (V15 09) (Z88 9)   12  Hyperlipidemia (272 4) (E78 5)   13  Laboratory examination ordered as part of a routine general medical examination    (V72 62) (Z00 00)   14  Major depressive disorder, recurrent severe without psychotic features (296 33) (F33 2)   15  Marital conflict (S12 79) (P22 0)   16  Need for hepatitis B vaccination (V05 3) (Z23)   17  Need for prophylactic vaccination and inoculation against bacterial diseases (V03 9)    (Z23)   18  Need for prophylactic vaccination and inoculation against influenza (V04 81) (Z23)   19  Positive PPD (795 51) (R76 11)   20  Post-menopausal (V49 81) (Z78 0)   21  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)   22  Screening examination for pulmonary tuberculosis (V74 1) (Z11 1)   23  Vitamin D deficiency (268 9) (E55 9)    Current Meds   1  Advair Diskus 250-50 MCG/DOSE Inhalation Aerosol Powder Breath Activated; 1 PUFF   TWICE DAILY; RINSE MOUTH AFTER USE AS NEEDED; Therapy: 36ISQ3088 to (Evaluate:89Kss2915)  Requested for: 21Apr2015; Last   Rx:21Apr2015 Ordered   2  ALPRAZolam 0 5 MG Oral Tablet (Xanax); Take 1 tablet 3 times daily as needed;    Therapy: 99CIR4625 to (TriplePulse)  Requested for: 97JFO6169; Last   Rx:61Tpz2557 Ordered   3  ARIPiprazole 2 MG Oral Tablet; TAKE 1 TABLET AT BEDTIME; Therapy: 11SEI8188 to (Evaluate:01Qxc7601)  Requested for: 43DBY2257; Last   Rx:91Wex2591 Ordered   4  Calcium 1250 MG TABS; TAKE 1 TABLET DAILY; Therapy: 56NDJ3366 to Recorded   5  Fluticasone Propionate 50 MCG/ACT Nasal Suspension; USE 2 SPRAYS IN EACH   NOSTRIL ONCE DAILY; Therapy: 63WIS2849 to (TriplePulse)  Requested for: 64GHZ7753; Last   Rx:14Urg6216 Ordered   6  Multi-Vitamins Oral Tablet; TAKE 1 TABLET DAILY; Therapy: 24OVA2257 to Recorded   7  PARoxetine HCl - 40 MG Oral Tablet; TAKE 1 TABLET IN THE EVENING; Therapy: 29JSJ4007 to (Claudell Sofia)  Requested for: 77HHM4318; Last   Rx:10May2017 Ordered   8  ProAir  (90 Base) MCG/ACT Inhalation Aerosol Solution; INHALE 2 PUFFS   EVERY 4 HOURS AS NEEDED FOR COUGH AND WHEEZE;   Therapy: 31DPA8540 to (Evaluate:46Ebo0165)  Requested for: 39Wqi1034; Last   Rx:07Vsn8582 Ordered   9  Verapamil HCl  MG Oral Capsule Extended Release 24 Hour; TAKE 1 CAPSULE   Weekly; Therapy: 45Zbk6901 to (Evaluate:23Jun2015) Recorded   10  Vitamin C 1000 MG Oral Tablet; TAKE 1 TABLET DAILY; Therapy: 63KZF3116 to Recorded   11  Vitamin D3 2000 UNIT Oral Capsule; TAKE 1 CAPSULE Daily; Therapy: 84IFK6534 to (Last Rx:48Lsf9835) Ordered    Allergies    1   No Known Drug Allergies    Signatures   Electronically signed by : Columba Trimble MSAPRNPMHCNS-BC; Jun 1 2017  5:59PM EST                       (Author)

## 2018-01-15 NOTE — PSYCH
Progress Note  Psychotherapy Provided [facility]: Individual Psychotherapy 50 minutes provided today  Goals addressed in session:   Goal #1  D: "I Don't know what I'll do   " " I have great ideas, but I don't have the energy to get things done "   "I can't ask my  Lucrecia Harvey to [employer], even though Christianna Alpers is like a grown man now, with puberty and all,and he's hard for me to wash him everywhere he needs to be washed"   " I'm afraid to ask Lucrecia Harvey, because Lucrecia Harvey has so much stress as it is---he has enormous pressure at [employer], he does the job of 2 people, at least  Negin Prows And Lucrecia Harvey has a LOT of patience   but he even told me he can't wait to get out of this! Negin Prows Negin Prows Negin Prows He means, this Marriage! And that makes me hurt, so bad "   " I used to bring income into our household, and now I haven't, for several years---this depression, and my anxiety, and Francesco's constant supervision, take my energy away  I have nothing left to give "   Pt has a depressed affect and mood  She was about 15 mins late for session, again---this is addressed with her  Pt has low motivation, low energy, low stamina  Has no evidence of current SI / HI / [place] / VH, but she has bouts of hopelessness  Her sleep is variable---some nights she's up late, with worry and angst, then she is tired the next day  Pt processes her thoughts, feelings,and behaviors  Pt has low self-esteem, since the loss of her job at [employer] some years ago---pt admits she was starting to decrease in energy and drive even while she held that job  Pt and  met as co-workers at [employer] both had decent incomes,and they could take vacations, go on fun "dates' during their early years    Pt still grieves the loss of a "healthy child", as in-vitro fertilization did not work, they wanted to adopt a healthy child,and the child they received (Christianna Alpers) ended-up having severe Autism,with out of control stemming and inappropriate behaviors, and severe developmental delays---the 15 yr old was in diapers totally, until recently  Pt feels exhausted caring for him, after school,and has difficulty bathing him, but does it  She does not use respite services, to go on a "date" or weekend with , "because respite was more trouble than it was worth " Pt did train to become a Phlebotomist, 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, but did not bother taking the [facility] Certification Exam---" I thought I would fail it  Melia Le Melia Le I didn't retain all the information I needed " We explore this--->pt was actually good at Phlebotomy, and it would have been a good part-time job    but pt did not like the idea of weekends, holidays, and evening or night shifts, as Francesco drained her of energy when he was home after school  Pt is assisted with some cognitive-reframing, but she is not amenable to it  She did not turn-in her Tax data to her , yet---"We probably will owe money that we don't have " Pt is still encouraged by this therapist to turn-in her tax info ASAP, to help avoid further stress, or even help avoid losing their home  Pt is informed by this Therapist that I DID complete the Disability forms for her  [employer] Marcela Leggett ), and we fax'd them to his office today,and may also send a hard copy to him  But pt is advised, for her own well-being, to keep a "Plan B" in mind, (part-time job, increase her own income )IF she is not approved for [facility] [address]  [employer] payments  A: Major Depressive Disorder, Recurrent, severe, F33 2; Generalized Anxiety Disorder, F41 10; financial stress and family stress  P: Continue Treatment Plan, Meds,and Psychotherapy  Pt has to take ownership for herself and her wellness, but she continues a passive and "helpless" mode, regarding herself and her future  Pain Scale and Suicide Risk [facility]: Current Pain Assessment: moderate to severe   On a scale of 0 to 10, the patient rates current pain at 1   Current suicide risk is low      [facility]: Diagnosis and Treatment Plan explained to patient, patient relates understanding diagnosis and is agreeable to Treatment Plan  Assessment    1  Major depressive disorder, recurrent severe without psychotic features (296 33) (F33 2)   2   JOSE ROBERTO (generalized anxiety disorder) (300 02) (F41 1)    Signatures   Electronically signed by : ELISABET Nicholas; Sep  6 2016  2:18PM EST                       (Author)    Electronically signed by : ELISABET Nicholas; Sep  6 2016  2:18PM EST                       (Author)

## 2018-01-15 NOTE — PSYCH
Progress Note  Psychotherapy Provided [facility]: Individual Psychotherapy 50 minutes provided today  Goals addressed in session:   Goal #1  D: " I feel so, so OLD  I feel the life and energy drained out of me "   Pt returns to Psychotherapy after a hiatus  Pt has a depressed mood and very fatigued affect  Pt has low energy, low motivation,and feels "scattered", and feels overwhelmed with Paperwork for her severely autistic 15 yr old adopted son Francesco's care, his upcoming IEP for Pre- [facility] [employer]am[employer] ( IEP meeting is in April),and pt also has to do Financial applications for Debbie Wagner to get funds from [employer] so that he can participate in a valuable Summer camp  Pt denies current SI Alan Marques / JOSE  Her PHQ9=17, Moderately Severe Depression  But she has experienced intermittent passive death thoughts  Pt continues her verbal safety contract,and she says her Saleem Quinn is strong,and she would not harm self or others, as it is "not God's Plan " Pt and  attend a Couples "Connect " Group at [employer], which is supportive  Pt processes her thoughts, feelings,and behaviors  Pt's paternal Uncle  this past week---pt felt guilty and "bad" that she and her  did not have the funds to go to the  in the [place] [address], where Linette Huff had lived on [address] [place] [address]  Pt is assisted with Cognitive-reframing, about herself and her strengths as a person,and pt is able to do "guilt-busting" thoughts, in session  Pt to not dwell on what she cannot control  But she will discuss issues with her ,and take charge of what she and  CAN control  Pt identifies a big priority: She will gather up Francesco's paperwork/ application forms, which are strewn about her house in every room,and organize them,and keep them in one room, multiple files already started in the File cabinet   Pt to prepare for the Francesco's April IEP meeting,and she knows what info she will convey, about his needs for Autistic /Structured small Classroom size, for [employer]  Pt to also apply for [employer], again, for [employer] summer camp, as he has qualified in the past  Pt also has him enrolled in Equine Therapy, which he does once a week after school  Pt to do calm Deep Breathing/ relaxation strategies, each day  Pt is financially very needy---she is still waiting for Social Security Disability Hearing  Fortunately, her  still works full-time at [employer], as a ---pt gives him support,also  Pt continues meds as prescribed by psychiatrist, but she says Wellbutrin made her extremely nauseous, so she takes the Paxil and Xanax as prescribed,and she will talk to Dr Loretta Roe about a possible replacement for the WBT  A: Major Depressive Disorder, Recurrent, severe, F33 2; Generalized Anxiety Disorder, F41 10; Severe Financial stress as pt has not had the energy, stamina, or motivation to hold down a job anymore , as she deals with the Stress of raising a severely Autistic adopted son who is taller and stronger than her, now  P: Continue Treatment Plan, Meds,and Psychotherapy  Pt to complete one goal per day,and give self-affirmation for same  Pain Scale and Suicide Risk [facility]: Current Pain Assessment: moderate to severe   On a scale of 0 to 10, the patient rates current pain at 2   Current suicide risk is low   Results/Data  PHQ-9 Adult Depression Screening 29Mar2017 02:01PM Lynne Seek     Test Name Result Flag Reference   PHQ-9 Adult Depression Score 17     Over the last two weeks, how often have you been bothered by any of the following problems?   Little interest or pleasure in doing things: More than half the days - 2  Feeling down, depressed, or hopeless: Nearly every day - 3  Trouble falling or staying asleep, or sleeping too much: Several days - 1  Feeling tired or having little energy: Nearly every day - 3  Poor appetite or over eating: Several days - 1  Feeling bad about yourself - or that you are a failure or have let yourself or your family down: More than half the days - 2  Trouble concentrating on things, such as reading the newspaper or watching television: Nearly every day - 3  Moving or speaking so slowly that other people could have noticed  Or the opposite -  being so fidgety or restless that you have been moving around a lot more than usual: Several days - 1  Thoughts that you would be better off dead, or of hurting yourself in some way: Several days - 1   PHQ-9 Adult Depression Screening Positive     PHQ-9 Difficulty Level Extremely difficult     PHQ-9 Severity      Moderately Severe Depression       Assessment    1  Major depressive disorder, recurrent severe without psychotic features (296 33) (F33 2)   2   JOSE ROBERTO (generalized anxiety disorder) (300 02) (F41 1)    Signatures   Electronically signed by : ELISABET López; Mar 29 2017  5:15PM EST                       (Author)    Electronically signed by : ELISABET López; Mar 29 2017  5:15PM EST                       (Author)

## 2018-01-15 NOTE — PSYCH
History of Present Illness  [facility] Clinical Progress Note [facility]:   Specialized Services Documentation - Therapist must complete separate progress note for each specific clinical activity in which the client participated during the day  ( ) Other Case Management 144 [facility] did not attend program this morning and did not call  This writer called her earlier today  She reported she continues to experience symptoms of cough and nasal congestion  She did see ENT last Friday and was placed on an antibiotic and referred for a chest xray  Discussed the guidelines of a PHP including the importance of consistent attendance  At this time, [facility] believes being in program every day interferes in her ability to keep up with household responsibilities, plus she has not been feeling well physically  She believes it will be less stressful for her if she drops out of PHP and resumes OP therapy  She plans to attend her OP therapy appointment 5/5/16 with Sam Weeks  D/C today at pt 's request  D/C called into [facility]ISHNAMURTH[facility]--[place]--[phone] ext  [phone]  Treatment Plan Problem(s): N/A  BONNIE Patel Follow-up Physician's Orders:   4/25/2016    11:00 AM Discharge as per patient request     MD Dipak Doe          Active Problems    1  Abnormal CBC (790 6) (R79 89)   2  Allergic rhinitis (477 9) (J30 9)   3  Asthma (493 90) (J45 909)   4  Asymptomatic menopausal state (V49 81) (Z78 0)   5  Breast cancer screening (V76 10) (Z12 39)   6  Bronchospasm (519 11) (J98 01)   7  Eczema (692 9) (L30 9)   8  Encounter for screening mammogram for malignant neoplasm of breast (V76 12)   (Z12 31)   9  Fatigue (780 79) (R53 83)   10  JOSE ROBERTO (generalized anxiety disorder) (300 02) (F41 1)   11  History of allergy (V15 09) (Z88 9)   12  Hyperlipidemia (272 4) (E78 5)   13   Laboratory examination ordered as part of a routine general medical examination    (V72 62) (Z00 00)   14  Major depressive disorder, recurrent, moderate (296 32) (F33 1)   15  Need for hepatitis B vaccination (V05 3) (Z23)   16  Need for prophylactic vaccination and inoculation against bacterial diseases (V03 9)    (Z23)   17  Need for prophylactic vaccination and inoculation against influenza (V04 81) (Z23)   18  Positive PPD (795 51) (R76 11)   19  Post-menopausal (V49 81) (Z78 0)   20  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)   21  Screening examination for pulmonary tuberculosis (V74 1) (Z11 1)   22  Severe recurrent major depression without psychotic features (296 33) (F33 2)   23  Vitamin D deficiency (268 9) (E55 9)    Past Medical History    1  History of A Fall (E888 9)   2  History of Acute upper respiratory infection (465 9) (J06 9)   3  History of Atelectasis (518 0) (J98 11)   4  History of Contusion Of The Chest Wall With Intact Skin Surface (922 1)   5  History of acute bronchitis (V12 69) (Z87 09)   6  History of acute bronchitis (V12 69) (Z87 09)   7  History of fatigue (V13 89) (Z87 898)   8  Denied: History of head injury   9  History of low back pain (V13 59) (Z87 39)   10  History of low back pain (V13 59) (Z87 39)   11  History of shortness of breath (V13 89) (Z87 898)   12  History of Joint pain, knee (719 46) (M25 569)   13  Need for hepatitis B vaccination (V05 3) (Z23)   14  Need for prophylactic vaccination and inoculation against bacterial diseases (V03 9)    (Z23)   15  Need for prophylactic vaccination and inoculation against influenza (V04 81) (Z23)   16  History of Palpitations (785 1) (R00 2)   17  History of 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)   18  Denied: History of Seizures   19  History of Subconjunctival hemorrhage, unspecified laterality (372 72) (H11 30)    Allergies    1  No Known Drug Allergies    Current Meds   1   Advair Diskus 250-50 MCG/DOSE Inhalation Aerosol Powder Breath Activated; 1 PUFF   TWICE DAILY; RINSE MOUTH AFTER USE AS NEEDED; Therapy: 50QGD9265 to (Evaluate:00Yja3903)  Requested for: 21Apr2015; Last   Rx:21Apr2015 Ordered   2  ALPRAZolam 0 5 MG Oral Tablet; Take 1 tablet 3 times daily as needed; Therapy: 20KCQ7785 to (Evaluate:38Yuk6887)  Requested for: 12Apr2016; Last   Rx:02Mar2016; Status: ACTIVE - Renewal Denied Ordered   3  BuPROPion HCl ER (SR) 100 MG Oral Tablet Extended Release 12 Hour; Take 1 tablet   daily; Therapy: 15Apr2016 to (Evaluate:50Iin5550)  Requested for: 15Apr2016; Last   Rx:15Apr2016 Ordered   4  Calcium 1250 MG TABS; TAKE 1 TABLET DAILY; Therapy: 24DAY9804 to Recorded   5  Fluticasone Propionate 50 MCG/ACT Nasal Suspension; USE 2 SPRAYS IN EACH   NOSTRIL ONCE DAILY; Therapy: 94AOR5945 to (Ysabel Hatchechubbee)  Requested for: 39NKP2441; Last   Rx:12May2015 Ordered   6  Multi-Vitamins Oral Tablet; TAKE 1 TABLET DAILY; Therapy: 11XDA6352 to Recorded   7  PARoxetine HCl - 30 MG Oral Tablet; TAKE 1 TABLET IN THE EVENING; Therapy: 45LAT6728 to (Evaluate:36Deu8539)  Requested for: 20HDQ9120; Last   Rx:02Mar2016 Ordered   8  ProAir  (90 Base) MCG/ACT Inhalation Aerosol Solution; INHALE 2 PUFFS   EVERY 4 HOURS AS NEEDED FOR COUGH AND WHEEZE;   Therapy: 61FPI1253 to (Evaluate:32Ico1097)  Requested for: 21Apr2015; Last   Rx:21Apr2015 Ordered   9  Verapamil HCl  MG Oral Capsule Extended Release 24 Hour; TAKE 1 CAPSULE   Weekly; Therapy: 22Uep4372 to (Evaluate:23Jun2015) Recorded   10  Vitamin C 1000 MG Oral Tablet; TAKE 1 TABLET DAILY; Therapy: 06DCH8154 to Recorded   11  Vitamin D3 2000 UNIT Oral Capsule; TAKE 1 CAPSULE Daily; Therapy: 33WZW0147 to (Last Rx:25Oct2013) Ordered    Family Psych History  Mother    1  No family history of mental disorder  Father    2  No family history of mental disorder  Grandmother    3  Family history of depression (V17 0) (Z81 8)  Family History    4  Family history of Hodgkin Disease   5  Family history of Hypothyroidism   6   Family history of Osteoporosis (V17 81)   7  Family history of Stroke Syndrome (V17 1)    Social History    · Being A Social Drinker   · College graduate   · Marital History - Currently    · Never A Smoker   · No drug use   · Denied: History of Tobacco Use   · Travel / Residence In [place]   · Denied: History of Using Intravenous Drugs   · Work History    Future Appointments    Date/Time Provider Specialty Site   06/29/2016 02:15 PM SHANTE Wilson   Psychiatry [facility]   05/05/2016 11:15 AM Enid Cabrera MS, APRN, PMHCNS_BS  [facility]     Signatures   Electronically signed by : Rober Guardado LCSW; Apr 25 2016  2:47PM EST                       (Author)    Electronically signed by : Rober Guardado LCSW; Apr 25 2016  3:19PM EST                       (Author)    Electronically signed by : SHANTE Duff ; Apr 26 2016  8:16AM EST                       (Author)    Electronically signed by : Eris Ragland RN; Apr 26 2016  8:27AM EST                       (Author)    Electronically signed by : Rober Guardado LCSW; Apr 28 2016 11:23AM EST                       (Author)

## 2018-01-16 NOTE — PSYCH
Progress Note  Psychotherapy Provided [facility]: Individual Psychotherapy 45 minutes provided today  Goals addressed in session:   Goal #1  D: " I have Pain=9, in both knees, from doing squats ---I thought it would be good to do exercise, but I did-in my knees "   " I did clear -out more papers, did a lot of shredding, at my house"    "I didn't get the final documents to my , yet  I did call and leave a message to him, though "   " I talked to my ---nothing can be done to speed- up the process"   " We have to pay our mortgage, It's $2240, a big stress"   "Our finances are bad   'I've even been looking at job-listings, but I have no energy "   Pt arrived late for session,and she has low energy, low motivation, overall  Denies BRENNAN Peacock /   Pt did get some of her paperwork organized at home,and she's shredding extra papers and getting rid of them  She did NOT get final documents to her  last week---she did not meet that goal  She did call him and she left a message,an update  Pt c/o Pain in both knees, =9, from doing Exercise   pt may get them checked-out/ xrayed, if the Pain persists  Pt appears sullen  Processes her thoughts, feelings,and behaviors  She is disappointed that her  Ameliaalee Middleton Esq) informed her that her Case cannot be expedited for a Hearing anytime soon  Pt weighs the pros and cons of trying to get a temporary, part-time job, to help pay the mortgage, their biggest concern at this time  Pt has some computer skills,and problem-solving skills (she's had to problem-solve re: son Francesco's Special Needs/Autism services ) Pt does not seem to have the energy or motivation to sustain a full-time job, or a high stress part-time job   Pt is assisted with cognitive-reframing, still looking at some blessings and positives in her life---Home, Food, Clothing, Vehicle, [employer] and his Job, and she relies on her Alice, Prayer, and 's support, during these difficult times  Continues meds, no side effects  A: Major Depressive Disorder, Recurrent, F33 2; Generalized Anxiety Disorder, F41 10; severe financial stress  P: Continue Treatment Plan, meds,and psychotherapy  Pt to do cognitive-reframing, one goal per day,and positive self-affirmation each day  Pt and family are going to 's cousin's [place], [place], for Thanksgiving  Pain Scale and Suicide Risk [facility]: Current Pain Assessment: moderate to severe   On a scale of 0 to 10, the patient rates current pain at 9   Current suicide risk is low   Behavioral Health Treatment Plan [facility]: Diagnosis and Treatment Plan explained to patient, patient relates understanding diagnosis and is agreeable to Treatment Plan  Assessment    1  Major depressive disorder, recurrent severe without psychotic features (296 33) (F33 2)   2   JOSE ROBERTO (generalized anxiety disorder) (300 02) (F41 1)    Signatures   Electronically signed by : NATALY De Santiago-BC; Nov 17 2016 11:49AM EST                       (Author)    Electronically signed by : ELISABET De Santiago; Nov 17 2016 11:49AM EST                       (Author)

## 2018-01-16 NOTE — PSYCH
Psych Med Mgmt    Appearance: adequate hygiene and grooming, demonstrated behavior psychomotor retardation and poor eye contact  Observed mood: depressed and anxious  Observed mood: affect was blunted  Speech: speech soft and fluent  Thought processes: coherent/organized  Hallucinations: no hallucinations present  Thought Content: no delusions  Abnormal Thoughts: The patient has no suicidal thoughts and no homicidal thoughts  Orientation: The patient is oriented to person, place and time  Recent and Remote Memory: short term memory intact and long term memory intact  Attention Span And Concentration: concentration impaired  Insight: Insight intact  Judgment: Her judgment was intact  Muscle Strength And Tone  Muscle strength and tone were normal  Normal gait and station  Language: no difficulty naming common objects, no difficulty repeating a phrase and no difficulty writing a sentence  Fund of knowledge: Patient displays adequate knowledge of current events, adequate fund of knowledge regarding past history and adequate fund of knowledge regarding vocabulary  The patient is experiencing no localized pain  On a scale of 0 - 10 the pain severity is a 0  Treatment Recommendations: Continue Paxil 40 mg every evening  Off Wellbutrin XL  Add Abilify 2 mg at bedtime to augment antidepressant  Continue Xanax 0 5 mg tid PRN  Therapy with Mehrdad Moore  Follows with PCP for medical issues  Instructed to follow with PCP for glucose and lipid monitoring        Risks, Benefits And Possible Side Effects Of Medications: Risks, benefits, and possible side effects of medications explained to patient and patient verbalizes understanding  Discussed with patient the risks of sedation, respiratory depression, impairment of ability to drive and potential for abuse and addiction related to treatment with benzodiazepine medications   The patient understands risk of treatment with benzodiazepine medications, agrees to not drive if feels impaired and agrees to take medications as prescribed  The patient has been filling controlled prescriptions on time as prescribed to [facility] program       She reports normal appetite, decreased energy, recent 3 lbs weight gain  and increase in number of sleep hours 10  Jarret Burnett she still has been feeling depressed since last visit despite medication adjustment done by Physician Assistant Heather Ryan on 4/10/2017 (Paxil was increased to 40 mg daily and Wellbutrin XL was discontinued as patient was not taking it due to nausea)  Reports depressive symptoms on and off and continues to feel sad at times  Still has anxiety symptoms, low energy and poor concentration  No suicidal or homicidal ideation  No psychotic symptoms  Still reports sleeping too much  Appetite is improved  No other side effects from medications reported  PHQ-9 is slightly decreased at 23 today (from 24 at the last visit)  Vitals  Signs   Recorded: 55FOM7424 02:08PM   Heart Rate: 71  Systolic: 806  Diastolic: 78  BP Cuff Size: Large  Height: 5 ft 3 in  Weight: 166 lb   BMI Calculated: 29 41  BSA Calculated: 1 79    Assessment    1  JOSE ROBERTO (generalized anxiety disorder) (300 02) (F41 1)   2  Major depressive disorder, recurrent severe without psychotic features (296 33) (F33 2)    Plan    1  ALPRAZolam 0 5 MG Oral Tablet (Xanax); Take 1 tablet 3 times daily as needed; Do Not Fill Before: 65EVG6418    2  PARoxetine HCl - 40 MG Oral Tablet; TAKE 1 TABLET IN THE EVENING   3  Follow-up visit in 5 weeks Evaluation and Treatment  Follow-up  Status: Hold For -   Scheduling  Requested for: 71QUO1858    4  ARIPiprazole 2 MG Oral Tablet; TAKE 1 TABLET AT BEDTIME    Review of Systems    Constitutional: recent 3 lb weight gain and feeling tired  Cardiovascular: no complaints of slow or fast heart rate, no chest pain, no palpitations  Respiratory: no complaints of shortness of breath, no wheezing, no dyspnea on exertion  Gastrointestinal: no complaints of abdominal pain, no constipation, no nausea, no diarrhea, no vomiting  Genitourinary: no complaints of dysuria, no incontinence, no pelvic pain, no urinary frequency  Musculoskeletal: no complaints of arthralgia, no myalgias, no limb pain, no joint stiffness  Integumentary: no complaints of skin rash, no itching, no dry skin  Neurological: no complaints of headache, no confusion, no numbness, no dizziness  Past Psychiatric History    Past Psychiatric History: No prior history of inpatient psychiatric treatment  Attended Partial Program in past one time  No history of past suicide attempts  Past medication trials with Paxil, Effexor, Wellbutrin, Xanax  Substance Abuse Hx    Substance Abuse History: Social alcohol use, no recreational drug use  Active Problems    1  Abnormal CBC (790 6) (R79 89)   2  Allergic rhinitis (477 9) (J30 9)   3  Asthma (493 90) (J45 909)   4  Asymptomatic menopausal state (V49 81) (Z78 0)   5  Breast cancer screening (V76 10) (Z12 39)   6  Bronchospasm (519 11) (J98 01)   7  Eczema (692 9) (L30 9)   8  Encounter for screening mammogram for malignant neoplasm of breast (V76 12)   (Z12 31)   9  Fatigue (780 79) (R53 83)   10  JOSE ROBERTO (generalized anxiety disorder) (300 02) (F41 1)   11  History of allergy (V15 09) (Z88 9)   12  Hyperlipidemia (272 4) (E78 5)   13  Laboratory examination ordered as part of a routine general medical examination    (V72 62) (Z00 00)   14  Major depressive disorder, recurrent severe without psychotic features (296 33) (F33 2)   15  Marital conflict (M99 80) (F68 0)   16  Need for hepatitis B vaccination (V05 3) (Z23)   17  Need for prophylactic vaccination and inoculation against bacterial diseases (V03 9)    (Z23)   18  Need for prophylactic vaccination and inoculation against influenza (V04 81) (Z23)   19  Positive PPD (795 51) (R76 11)   20  Post-menopausal (V49 81) (Z78 0)   21  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)   22  Screening examination for pulmonary tuberculosis (V74 1) (Z11 1)   23  Vitamin D deficiency (268 9) (E55 9)    Past Medical History    1  History of A Fall (E888 9)   2  History of Acute upper respiratory infection (465 9) (J06 9)   3  History of Atelectasis (518 0) (J98 11)   4  History of Contusion Of Chest Wall With Intact Skin Surface (922 1)   5  History of acute bronchitis (V12 69) (Z87 09)   6  History of acute bronchitis (V12 69) (Z87 09)   7  History of fatigue (V13 89) (Z87 898)   8  Denied: History of head injury   9  History of low back pain (V13 59) (Z87 39)   10  History of low back pain (V13 59) (Z87 39)   11  History of shortness of breath (V13 89) (Z87 898)   12  History of Joint pain, knee (719 46) (M25 569)   13  Need for hepatitis B vaccination (V05 3) (Z23)   14  Need for prophylactic vaccination and inoculation against bacterial diseases (V03 9)    (Z23)   15  Need for prophylactic vaccination and inoculation against influenza (V04 81) (Z23)   16  History of Palpitations (785 1) (R00 2)   17  History of 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)   18  Denied: History of Seizures   19  History of Subconjunctival hemorrhage, unspecified laterality (372 72) (H11 30)    The active problems and past medical history were reviewed and updated today  Allergies    1  No Known Drug Allergies    Current Meds   1  Advair Diskus 250-50 MCG/DOSE Inhalation Aerosol Powder Breath Activated; 1 PUFF   TWICE DAILY; RINSE MOUTH AFTER USE AS NEEDED; Therapy: 62SPD1814 to (Evaluate:61Iru2365)  Requested for: 21Apr2015; Last   Rx:92Fmp1923 Ordered   2  ALPRAZolam 0 5 MG Oral Tablet; Take 1 tablet 3 times daily as needed; Therapy: 94HOT8099 to (Tamiko Sevilla)  Requested for: 96OIE6196; Last   Rx:67Avf5732 Ordered   3   Calcium 1250 MG TABS; TAKE 1 TABLET DAILY; Therapy: 13OTP1545 to Recorded   4  Fluticasone Propionate 50 MCG/ACT Nasal Suspension; USE 2 SPRAYS IN EACH   NOSTRIL ONCE DAILY; Therapy: 34WLV5540 to (Avinash Sylvester)  Requested for: 03WFB8667; Last   Rx:90Fpg0944 Ordered   5  Multi-Vitamins Oral Tablet; TAKE 1 TABLET DAILY; Therapy: 71DQQ3946 to Recorded   6  PARoxetine HCl - 40 MG Oral Tablet; TAKE 1 TABLET IN THE EVENING; Therapy: 27SLX3578 to (Evaluate:72Pwb3766)  Requested for: 07Tku9735; Last   Rx:36Gzp8925 Ordered   7  ProAir  (90 Base) MCG/ACT Inhalation Aerosol Solution; INHALE 2 PUFFS   EVERY 4 HOURS AS NEEDED FOR COUGH AND WHEEZE;   Therapy: 79PUN3182 to (Evaluate:54Hsk9496)  Requested for: 33Jrg7597; Last   Rx:61Dxi2887 Ordered   8  Verapamil HCl  MG Oral Capsule Extended Release 24 Hour; TAKE 1 CAPSULE   Weekly; Therapy: 24Epi4843 to (Evaluate:39Eth3878) Recorded   9  Vitamin C 1000 MG Oral Tablet; TAKE 1 TABLET DAILY; Therapy: 03OTX5101 to Recorded   10  Vitamin D3 2000 UNIT Oral Capsule; TAKE 1 CAPSULE Daily; Therapy: 76WZP6914 to (Last Rx:65Yqy5768) Ordered    The medication list was reviewed and updated today  Family Psych History  Mother    1  No family history of mental disorder  Father    2  No family history of mental disorder  Grandmother    3  Family history of depression (V17 0) (Z81 8)  Family History    4  Family history of Hodgkin Disease   5  Family history of Hypothyroidism   6  Family history of Osteoporosis (V17 81)   7  Family history of Stroke Syndrome (V17 1)    The family history was reviewed and updated today  Social History    · Being A Social Drinker   · College graduate   · Marital conflict (H07 07) (I49 3)   · Marital History - Currently    · Never A Smoker   · No drug use   · Denied: History of Tobacco Use   · Travel / Residence In [place]   · Denied: History of Using Intravenous Drugs   · Work History  The social history was reviewed and updated today   The social history was reviewed and is unchanged  , lives with  and 15year old adoptive son who is autistic  Unemployed, worked in office management  Applied for disability  Has bachelor's degree in communications  History Of Phys/Sex Abuse Or Perpetration    History Of Phys/Sex Abuse or Perpetration: No history of physical or sexual abuse  End of Encounter Meds    1  Advair Diskus 250-50 MCG/DOSE Inhalation Aerosol Powder Breath Activated; 1 PUFF   TWICE DAILY; RINSE MOUTH AFTER USE AS NEEDED; Therapy: 61DRD1775 to (Evaluate:77Iet4856)  Requested for: 21Apr2015; Last   Rx:21Apr2015 Ordered   2  Fluticasone Propionate 50 MCG/ACT Nasal Suspension; USE 2 SPRAYS IN EACH   NOSTRIL ONCE DAILY; Therapy: 19NUX7063 to (Franchesca Dose)  Requested for: 74JWD5632; Last   Rx:12May2015 Ordered    3  ProAir  (90 Base) MCG/ACT Inhalation Aerosol Solution; INHALE 2 PUFFS   EVERY 4 HOURS AS NEEDED FOR COUGH AND WHEEZE;   Therapy: 54BYE5916 to (Evaluate:26Btm3156)  Requested for: 21Apr2015; Last   Rx:21Apr2015 Ordered    4  ALPRAZolam 0 5 MG Oral Tablet (Xanax); Take 1 tablet 3 times daily as needed; Therapy: 23YHZ9779 to (Mario Dos Santos)  Requested for: 20FCR8128; Last   Rx:10May2017 Ordered    5  PARoxetine HCl - 40 MG Oral Tablet; TAKE 1 TABLET IN THE EVENING; Therapy: 21KTG2354 to (Bobo Haywood)  Requested for: 44ICJ8579; Last   Rx:10May2017 Ordered    6  Calcium 1250 MG TABS; TAKE 1 TABLET DAILY; Therapy: 43JVZ3147 to Recorded   7  Multi-Vitamins Oral Tablet; TAKE 1 TABLET DAILY; Therapy: 21KSE9457 to Recorded   8  Vitamin C 1000 MG Oral Tablet; TAKE 1 TABLET DAILY; Therapy: 83DXN4129 to Recorded    9  ARIPiprazole 2 MG Oral Tablet; TAKE 1 TABLET AT BEDTIME; Therapy: 21BFR9551 to (Evaluate:43Jmj8128); Last Rx:10May2017 Ordered    10  Verapamil HCl  MG Oral Capsule Extended Release 24 Hour; TAKE 1 CAPSULE    Weekly; Therapy: 85Yqq6560 to (Evaluate:23Jun2015) Recorded    11  Vitamin D3 2000 UNIT Oral Capsule; TAKE 1 CAPSULE Daily;     Therapy: 75DSU1156 to (Last Rx:25Oct2013) Ordered    Future Appointments    Date/Time Provider Specialty Site   05/17/2017 04:15 PM Vaibhav Liriano MS, APRN, PMHCNS_BS  [facility]   06/01/2017 01:15 PM Vaibhav Liriano MS, APRN, PMHCNS_BS  [facility]   06/14/2017 10:15 AM Vaibhav Liriano MS, APRN, PMHCNS_BS  [facility]   06/28/2017 10:15 AM Vaibhav Liriano MS, APRN, PMHCNS_BS  [facility]     Signatures   Electronically signed by : SHANTE Akhtar ; May 10 2017  2:29PM EST                       (Author)

## 2018-01-16 NOTE — PSYCH
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---no response yet  I do need to do Something, because the financial pressures are too much on my "      " I didn't finish getting my Tax stuff to our  yet  Jeff Gilbert I know I should get that done '   " I'm helping Shannen Lazo to get his morning hygiene tasks done on his own---I'm making a simple List today, and will post it at his sink ,in the bathroom, and keep reviewing it with him, step-by step, until he does it himself, before school---he's on the School Bus every weekday by 8 am"   " He's 13,and next year he will be in high school, continuing classes geared for severely Autistic children "    Pt has an anxious affect, but slightly brighter mood than previously  Pt denies BR[address]NA[address]  Pt processes her thoughts, feelings,and behaviors  Pt has started to clear out the paper-clutter in her house,and her goal is to STILL get the tax info/ chronology of financial events, to her  before the end of the year  Pt is assisted with Cognitive-reframing, Problem-solving, and Calm, Deep-breathing  Pt and  are so financially stressed that pt has started applying Online for jobs , as she has heard nothing from [employer] yet, even though she applied a while ago  Pt to give positive self-affirmations for each goal she accomplishes daily  Pt continues meds, no side effects  A: Major Depressive Disorder, recurrent, severe, F33 2; Generalized Anxiety Disorder, F41 10, Financial Stress  P: Continue Treatment Plan, Meds,and Psychotherapy         Pain Scale and Suicide Risk [facility]: Current Pain Assessment: moderate to severe   On a scale of 0 to 10, the patient rates current pain at 1   Current suicide risk is low   Behavioral Health Treatment Plan [address] [place] [address] [address]: Diagnosis and Treatment Plan explained to patient, patient relates understanding diagnosis and is agreeable to Treatment Plan  Assessment    1  Major depressive disorder, recurrent severe without psychotic features (296 33) (F33 2)   2   JOSE ROBERTO (generalized anxiety disorder) (300 02) (F41 1)    Signatures   Electronically signed by : Peña Hicks MSAPRNPMHCNS-BC; Dec  9 2016  5:41PM EST                       (Author)

## 2018-01-16 NOTE — PSYCH
Progress Note  Psychotherapy Provided [facility]: Individual Psychotherapy 50 minutes provided today  Goals addressed in session:   Goal #1  D: " I do get thoughts about, 'What's the use?', and hopeless thoughts, many days, but I will not hurt myself or others "   " I got these papers for you to fill out, from my  (Cecelia Ceron)   Po  Po  Po We need, Genaro and I, some financial break, we don't want to lose our house    and I've been struggling with this Depression , for so long   Po  Po I can't put all the burden on him   but I can't seem to get motivated, get the energy, to do my things at home, my stuff to the  even   Po  Po  Po Scott Becker is getting so angry with me,and I don't blame him  "   Po " I do take care of Wally Noble and all his needs that I can---but I'm exhausted"   " I take the new med, Bupropion,and the Paxil and Xanax that are prescribed by Dr Manuel Dougherty I wonder if anything will ever help?"   Pt has a depressed affect and mood  Denies current Adama Heredia / Heidi Walls / Heavenly Haas / VH  But, she is very fatigued, has low motivation, low energy   and pushes herself to get even one goal done per day  Pt did motivate herself to get papers sent here by her , somehow---pt signs a Release of Info,and pays the $25 fee for the Paperwork to be completed ( Paid at )  Pt processes her thoughts, feelings,and behaviors  Pt does not feel she could tend to the demands of a regular job, full-time or part-time  Pt is pursuing Social Security Disability, to ease the severe financial burden on her ,as she says he is even getting burned-out/ overwhelmed by the demands of his own job at [employer]  Pt is assisted with Cognitive-reframing, looking at her strengths,and trying to change her all-negative thoughts, to some more realistic ones  Pt did appreciate a special visit from her Aunt recently, but Tamar Combs is in a wheelchair, so a lot of work and support had to be done   Pt's Aunt is flying back to her home in the [place] today  Pt to try to do one goal per day,and give self-affirmation  This Therapist tells pt about a possible Home for Autistic Adults to be built in the [facility] area ---an Ad for Adrianna Welch appeared on a local radio station recently  The thought of this gives some hope to pt, as she cannot easily handle the physical care and washing of her tall Autistic son Patti Wilks 7th Grader at [facility] this year---and she and Beatriz Chacko will eventually need Aides and other helpful care for him  Listening, Support,and Validation also given in session  Pt to try to take Time-for-Self, a few minutes each day, and add more Social Supports this coming year---but she has found that Parents Groups for Autism-Spectrum clients just surround her with more depression/lack of hope, so she DOESN't want them  A; Major Depressive Disorder, recurrent, F33 2; Generalized Anxiety disorder, F41 10; Severe Stress raising her adopted Autistic child,and severe Financial Stress  P: Continue Meds and Psychotherapy  Pain Scale and Suicide Risk [facility]: On a scale of 0 to 10, the patient rates current pain at 1   Current suicide risk is low   Behavioral Health Treatment Plan [facility]: Diagnosis and Treatment Plan explained to patient, patient relates understanding diagnosis and is agreeable to Treatment Plan  Assessment    1  Major depressive disorder, recurrent severe without psychotic features (296 33) (F33 2)   2   JOSE ROBERTO (generalized anxiety disorder) (300 02) (F41 1)    Signatures   Electronically signed by : Pardeep Mayo MSAPRNPMHCNS-BC; Sep  1 2016 10:25AM EST                       (Author)

## 2018-01-16 NOTE — PSYCH
Treatment Plan Tracking    #1 Treatment Plan not completed within required time limits due to: Client presented with emotional/behavioral issues that required clinical intervention            Signatures   Electronically signed by : TAMARA JarrettMHCNS-BC; Aug 31 2017 11:19AM EST                       (Author)

## 2018-01-16 NOTE — PSYCH
Progress Note  Psychotherapy Provided [facility]: Individual Psychotherapy 50 minutes provided today  Goals addressed in session:   Goal #1  D: " I feel some relief, some burden lifted off my shoulders, since the [place] Approved my Social Security Disability, but I haven't received any letter yet, so I'm still somewhat anxious inside myself "   " I also am scattered, like I have papers , in piles, all over my Living Room floor, but I didn't get a File cabinet yet, to put them away---Yet, I'm distracted by helping our nephew, Caro, who has Aspberger's, because my 's brother and his wife don't take the initiative to find out what programs are available for him  "  Sri Hook " I am trying to get one goal done, per day, and feel good about it--->I made it here today,and Therapy helps me "   " I forgot to take my Paroxetine last night, I was so tired, and I didn't take my Alprazolam this morning---I'm taking it now, because it does help to calm my nerves  '   " And, Law Valentin and I took Nasreen Sanford to the [facility] for Francesco's 14th birthday---here' s a picture  Sri Hook He enjoyed it!"   Pt has a moderately anxious affect, and moderately depressed mood  Denies SI /HI/AH /VH  Her sleep is variable--she's trying to establish a routine of being in bed by 11pm,and up by 7 or 8am, but she is up later at night,sometimes---her only time to herself  Appetite is good  Her PHQ9= 12, an improvement from her last screening=17  Pt processes her thoughts, feelings,and behaviors  Pt feels some sense of relief Financially and from severe depression, since the [place] Approved her Social Security Disability---but no money has been forthcoming, yet, so pt is still behind on medical bills and other bills  When pt gets "down" thoughts, she is doing Cognitive-reframing, focussing on her blessings in life, her strengths, and what she CAN do, instead of what she is not able to do   Pt's current mini-goal is to get her paperwork, and Francesco's School documents& his Health Care documents organized and into a new file cabinet---pt to buy the File cabinet this week,and get the project done  This will clear her mind, not to see papers everywhere  Pt also helped her 's brother to locate services for his son, Caro, who deals with Aspberger's Syndrome  Pt also feels good , re: she and  celebrated their adopted son Francesco's 14th birthday with a trip to [place]  Pt identifies her current Purposes in life as "being there" for her , who has a very demanding job at [employer], and also organizing her autistic son Singh Moseley preparing him for his life ahead--->pt completed his [employer], and he does current Therapeutic Horseback-riding one day a week, and Swimming classes one day a week  Pt to do Deep-breathing/ Relaxation exercises ,and give at least one positive Self-affirmation each day  Pt has put herself on a healthier diet lately, also,and hopes to lose a few lbs  this summer and fall  Some Psychoeducation has also been given in session today, re: her Paroxetine, Abilify,and Alprazolam,as pt had NOT started her Abilify yet ,and she had inadvertently skipped a couple doses of her other meds lately--->pt to make efforts to take her meds as prescribed by Dr Yue Madden we'll see the effects of same in the future  A: Major Depressive Disorder, recurrent, severe ,F33 2; Generalized Anxiety Disorder, F41 10, and Family stressors and Financial stressors  P: Continue Treatment Plan, Meds, and Psychotherapy  Pt to do one goal per day,and give self-affirmation each day---> pt to get her papers into a new file cabinet ! Pain Scale and Suicide Risk [facility]: Current Pain Assessment: moderate to severe   On a scale of 0 to 10, the patient rates current pain at 1   Current suicide risk is low          Behavioral Health Treatment Plan [facility] beds at the [facility]: Diagnosis and Treatment Plan explained to patient, patient relates understanding diagnosis and is agreeable to Treatment Plan  Results/Data  PHQ-9 Adult Depression Screening 28Jun2017 11:05AM Rissa Ramey     Test Name Result Flag Reference   PHQ-9 Adult Depression Score 12     Over the last two weeks, how often have you been bothered by any of the following problems? Little interest or pleasure in doing things: Several days - 1  Feeling down, depressed, or hopeless: More than half the days - 2  Trouble falling or staying asleep, or sleeping too much: More than half the days - 2  Feeling tired or having little energy: More than half the days - 2  Poor appetite or over eating: Several days - 1  Feeling bad about yourself - or that you are a failure or have let yourself or your family down: Several days - 1  Trouble concentrating on things, such as reading the newspaper or watching television: More than half the days - 2  Moving or speaking so slowly that other people could have noticed  Or the opposite -  being so fidgety or restless that you have been moving around a lot more than usual: Several days - 1  Thoughts that you would be better off dead, or of hurting yourself in some way: Not at all - 0   PHQ-9 Adult Depression Screening Positive     PHQ-9 Difficulty Level Very difficult     PHQ-9 Severity Moderate Depression         Assessment    1  Major depressive disorder, recurrent severe without psychotic features (296 33) (F33 2)   2   JOSE ROBERTO (generalized anxiety disorder) (300 02) (F41 1)    Signatures   Electronically signed by : Kaitlyn Coburn MSAPRNPMHCNS-BC; Jun 28 2017 12:19PM EST                       (Author)

## 2018-01-16 NOTE — PSYCH
Progress Note  Psychotherapy Provided [facility]: Individual Psychotherapy 50 minutes provided today  Goals addressed in session:   Goal #1  D: " Alexey Batista really needed me to help him with Zaida Sargent, so I did go on the brief trip with him to [place] to see my 's brother  Jerome Quezada I was very tired afterward, but I did it "  Jerome Quezada " I was crying, very anxious, felt a heavy weight-of-the -world on me, at my Social Security Disability Hearing on June 5---but the [place] DID APPROVE my Social Security"   " I am so thankful to God "   " It gives me a little bit of Hope '   " We will have SOME Financial relief,and I am so grateful ---we will somehow be able to pay our bills "   " I know I don't have the energy or the concentration to hold down a job, with the stress of Francesco's care,and my depression, and the stress that my poor  Alexey Batista feels at work---my  is under enormous pressure   he has been with Jacobo for almost 30 years, but their demands on him are more and more all the time,and I worry about him "   Pt has a depressed, anxious, fatigued affect  Has a depressed mood  PHQ9= 17 today, moderately severe depression  Pt denies current SI /HI [address]  Pt is fatigued most of the time,and she is pushing herself to get one goal done per day,and to give self affirmation for same  Pt processes what occurred at her Social Security Disability hearing on June 5  Pt was overwhelmed with anxiety, and she tried Deep-breathing/ self-talk , at the hearing, but she says she cried, instead  Pt does feel some financial relief that the  did approve her for Social Security Disability---pt feels a morsel of HOPE, now  Pt says she feels GOD was looking out for her and her family---pt's Niko Archuleta is very personal, to her  Pt to do one goal per day,and give positive self-messages, each day  Pt's goal this week is to clean her kitchen,and have actual clear surfaces on every counter and on her kitchen table, by Saturday   pt and her  will feel better about that  Pt continues meds as prescribed by Dr Kaylene Lieberman  A: Major Depressive Disorder, recurrent , severe, F33 2; Generalized Anxiety Disorder, F41 10; Financial stress and family stressors  P: Continue Treatment Plan, Meds,and Psychotherapy  Pain Scale and Suicide Risk  [facility]: Current Pain Assessment: moderate to severe   On a scale of 0 to 10, the patient rates current pain at 1   Current suicide risk is low   Behavioral Health Treatment Plan [address] [place] [address] [address]: Diagnosis and Treatment Plan explained to patient, patient relates understanding diagnosis and is agreeable to Treatment Plan  Results/Data  PHQ-9 Adult Depression Screening 14Jun2017 11:17AM Joanne Michelle     Test Name Result Flag Reference   PHQ-9 Adult Depression Score 17     Over the last two weeks, how often have you been bothered by any of the following problems? Little interest or pleasure in doing things: More than half the days - 2  Feeling down, depressed, or hopeless: More than half the days - 2  Trouble falling or staying asleep, or sleeping too much: Nearly every day - 3  Feeling tired or having little energy: More than half the days - 2  Poor appetite or over eating: More than half the days - 2  Feeling bad about yourself - or that you are a failure or have let yourself or your family down: More than half the days - 2  Trouble concentrating on things, such as reading the newspaper or watching television: Nearly every day - 3  Moving or speaking so slowly that other people could have noticed  Or the opposite -  being so fidgety or restless that you have been moving around a lot more than usual: Several days - 1  Thoughts that you would be better off dead, or of hurting yourself in some way: Not at all - 0   PHQ-9 Adult Depression Screening Positive     PHQ-9 Difficulty Level Very difficult     PHQ-9 Severity      Moderately Severe Depression       Assessment    1   Major depressive disorder, recurrent severe without psychotic features (296 33) (F33 2)   2   JOSE ROBERTO (generalized anxiety disorder) (300 02) (F41 1)    Signatures   Electronically signed by : ELISABET Aragon; Jun 14 2017 12:56PM EST                       (Author)    Electronically signed by : ELISABET Aragon; Jun 14 2017 12:56PM EST                       (Author)

## 2018-01-17 NOTE — PSYCH
Progress Note  Psychotherapy Provided [facility]: Individual Psychotherapy 50 minutes provided today  Goals addressed in session:   Goal #1  D:" I'm doing things, like helping this elderly gentleman, our neighbor, who fell at home,and I'm taking him to his doctor appointment this afternoon   but maybe I'm AVOIDING what I NEED to get done, I realize   " " I've procrastinated about our taxes---I didn't finish getting the stuff together, to get to our !"   Pt appears very well-groomed, and has a slightly brighter affect today  Better eye-contact,and she is a little more energetic  Pt has no current SI /HI Emaline Reins [address]  Pt processes her stressors, especially her poor Finances in recent years  Pt did contact her  Dustin Hernandez, went to his office,and signed some updated papers for him  That felt good to get that done  Pt's  Larissa Molina) works long hours at [employer], and then he expresses anger to pt  re: pt did not get important things done at the house  Pt did NOT finish her Tax project, yet  We process why she delays---pt is worried re: the  Dede Moritz, [place] ) will need more payment soon,and pt also does not motivate herself to find the financial receipts and supporting documents form the period of 2012 to 2016, which the  needs, "because they're in boxes and Totes somewhere,and I save TOO MANY things in Totes!" We do active problem-solving in session---> pt makes a phone call to her 's office, on her cell phone, while in session,and leaves a message with his  re; joe't needed,and follow-up materials to be delivered by her soon  Pt agrees to find and finish this "tax project" of hers, this weekend, as Deedee Soto has to work all weekend--- pt to get things to  by Monday, Nov 7  Pt then will arrange a little "date" out with her  for next Thursday or Friday when he has off ,and have some fun together, not arguing about finances   Pt states Marli Kulkarni will be happier, once the taxes are resolved  Pt and  attend a Couples' Group at their [employer],and are all are reading a book, "Get Your Love On "   this is helpful to pt  Pt is given listening, support,and affirmation---> she is a very charitable person, helping her neighbor (whose family is not attentive),and she's raising her autustic adopted son ,Jen Grey, which is a lot of resource-finding /paperwork/ love/ and attention  Pt to put some energy into HER, too  Cognitive-reframing done,and deep-breathing done, as part of session also  pt continues meds, no side effects  A: Major Depressive Disorder, recurrent, severe, F33 2; Generalized Anxiety Disorder, F41 10  P: Continue Tx  Plan, Meds,and Psychotherapy   pt to get her Back-Tax- Information and supporting data to her  by Monday, then have a "date' with  by next Friday, positive conversation! Pain Scale and Suicide Risk [facility]: Current Pain Assessment: no pain   On a scale of 0 to 10, the patient rates current pain at 0   Current suicide risk is low   Behavioral Health Treatment Plan [facility]: Diagnosis and Treatment Plan explained to patient, patient relates understanding diagnosis and is agreeable to Treatment Plan  Assessment    1  Major depressive disorder, recurrent severe without psychotic features (296 33) (F33 2)   2  JOSE ROBERTO (generalized anxiety disorder) (300 02) (F41 1)   3   Marital conflict (Z86 69) (X81 3)    Signatures   Electronically signed by : Sera Cook MSAPRNPMHCNS-BC; Nov 4 2016 12:44PM EST                       (Author)

## 2018-01-17 NOTE — PSYCH
History of Present Illness  [facility] Clinical Progress Note [facility]:   Specialized Services Documentation - Therapist must complete separate progress note for each specific clinical activity in which the client participated during the day  ( ) Other Case Management 1133 Pt  called program  around 1100 to state she would not be in program because of ongoing sinus/allergy issues  She has an appt  with an ENT this afternoon  Excused absence  Treatment Plan Problem(s): N/A  Emma Wilson LCSW           Active Problems    1  Abnormal CBC (790 6) (R79 89)   2  Allergic rhinitis (477 9) (J30 9)   3  Asthma (493 90) (J45 909)   4  Asymptomatic menopausal state (V49 81) (Z78 0)   5  Breast cancer screening (V76 10) (Z12 39)   6  Bronchospasm (519 11) (J98 01)   7  Eczema (692 9) (L30 9)   8  Encounter for screening mammogram for malignant neoplasm of breast (V76 12)   (Z12 31)   9  Fatigue (780 79) (R53 83)   10  JOSE ROBERTO (generalized anxiety disorder) (300 02) (F41 1)   11  History of allergy (V15 09) (Z88 9)   12  Hyperlipidemia (272 4) (E78 5)   13  Laboratory examination ordered as part of a routine general medical examination    (V72 62) (Z00 00)   14  Major depressive disorder, recurrent, moderate (296 32) (F33 1)   15  Need for hepatitis B vaccination (V05 3) (Z23)   16  Need for prophylactic vaccination and inoculation against bacterial diseases (V03 9)    (Z23)   17  Need for prophylactic vaccination and inoculation against influenza (V04 81) (Z23)   18  Positive PPD (795 51) (R76 11)   19  Post-menopausal (V49 81) (Z78 0)   20  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)   21  Screening examination for pulmonary tuberculosis (V74 1) (Z11 1)   22  Severe recurrent major depression without psychotic features (296 33) (F33 2)   23  Vitamin D deficiency (268 9) (E55 9)    Past Medical History    1  History of A Fall (E888 9)   2   History of Acute upper respiratory infection (465 9) (J06 9)   3  History of Atelectasis (518 0) (J98 11)   4  History of Contusion Of The Chest Wall With Intact Skin Surface (922 1)   5  History of acute bronchitis (V12 69) (Z87 09)   6  History of acute bronchitis (V12 69) (Z87 09)   7  History of fatigue (V13 89) (Z87 898)   8  Denied: History of head injury   9  History of low back pain (V13 59) (Z87 39)   10  History of low back pain (V13 59) (Z87 39)   11  History of shortness of breath (V13 89) (Z87 898)   12  History of Joint pain, knee (719 46) (M25 569)   13  Need for hepatitis B vaccination (V05 3) (Z23)   14  Need for prophylactic vaccination and inoculation against bacterial diseases (V03 9)    (Z23)   15  Need for prophylactic vaccination and inoculation against influenza (V04 81) (Z23)   16  History of Palpitations (785 1) (R00 2)   17  History of 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)   18  Denied: History of Seizures   19  History of Subconjunctival hemorrhage, unspecified laterality (372 72) (H11 30)    Allergies    1  No Known Drug Allergies    Current Meds   1  Advair Diskus 250-50 MCG/DOSE Inhalation Aerosol Powder Breath Activated; 1 PUFF   TWICE DAILY; RINSE MOUTH AFTER USE AS NEEDED; Therapy: 04PBR1865 to (Evaluate:70Kmc4607)  Requested for: 21Apr2015; Last   Rx:19Hzs6771 Ordered   2  ALPRAZolam 0 5 MG Oral Tablet; Take 1 tablet 3 times daily as needed; Therapy: 60LWE2904 to (Evaluate:46Tre0469)  Requested for: 12Apr2016; Last   Rx:02Mar2016; Status: ACTIVE - Renewal Denied Ordered   3  BuPROPion HCl ER (SR) 100 MG Oral Tablet Extended Release 12 Hour; Take 1 tablet   daily; Therapy: 30Cgc4064 to (Evaluate:49Sck6136)  Requested for: 15Apr2016; Last   Rx:08Efk4740 Ordered   4  Calcium 1250 MG TABS; TAKE 1 TABLET DAILY; Therapy: 28RBG8985 to Recorded   5  Fluticasone Propionate 50 MCG/ACT Nasal Suspension; USE 2 SPRAYS IN EACH   NOSTRIL ONCE DAILY; Therapy: 76YYJ5140 to (Chad Tay)  Requested for: 11DDX6049;  Last Rx:20Gdy1028 Ordered   6  Multi-Vitamins Oral Tablet; TAKE 1 TABLET DAILY; Therapy: 59ZXH5739 to Recorded   7  PARoxetine HCl - 30 MG Oral Tablet; TAKE 1 TABLET IN THE EVENING; Therapy: 47YCH0031 to (Evaluate:00Tfj2334)  Requested for: 99EKV2627; Last   Rx:02Mar2016 Ordered   8  ProAir  (90 Base) MCG/ACT Inhalation Aerosol Solution; INHALE 2 PUFFS   EVERY 4 HOURS AS NEEDED FOR COUGH AND WHEEZE;   Therapy: 31DIQ3469 to (Evaluate:32Ewg3698)  Requested for: 34Jwk2994; Last   Rx:21Apr2015 Ordered   9  Verapamil HCl  MG Oral Capsule Extended Release 24 Hour; TAKE 1 CAPSULE   Weekly; Therapy: 81Rgp6238 to (Evaluate:23Jun2015) Recorded   10  Vitamin C 1000 MG Oral Tablet; TAKE 1 TABLET DAILY; Therapy: 25QDG6654 to Recorded   11  Vitamin D3 2000 UNIT Oral Capsule; TAKE 1 CAPSULE Daily; Therapy: 93MFT6679 to (Last Rx:25Oct2013) Ordered    Family Psych History  Mother    1  No family history of mental disorder  Father    2  No family history of mental disorder  Grandmother    3  Family history of depression (V17 0) (Z81 8)  Family History    4  Family history of Hodgkin Disease   5  Family history of Hypothyroidism   6  Family history of Osteoporosis (V17 81)   7  Family history of Stroke Syndrome (V17 1)    Social History    · Being A Social Drinker   · College graduate   · Marital History - Currently    · Never A Smoker   · No drug use   · Denied: History of Tobacco Use   · Travel / Residence In [place]   · Denied: History of Using Intravenous Drugs   · Work History    Future Appointments    Date/Time Provider Specialty Site   06/29/2016 02:15 PM SHANTE Poe   Psychiatry [facility]   05/05/2016 11:15 AM Tatu, Camellia Fabry, MS, APRN, PMHCNS_BS  [facility]     Signatures   Electronically signed by : Noam Vásquez LCSW; Apr 22 2016 11:35AM EST                       (Author)

## 2018-01-17 NOTE — PSYCH
Risk Assessment    The following ratings are based on my review of records  Risk of Harm to Self:   Historical Risk Factors include: chronic psychiatric problems  Recent Specific Risk Factors include: experienced fleeting ideation, sense of hopelessness/helplessness, unable to visualize a realistic positive future, feelings of guilt or self blame, recent losses: loss of 's income due to work strike, worries about finances or work and diagnosis of depression  These risk factors presented within the last few months  Risk of Harm to Others:   Demographic Risk Factors include: unemployed  Recent Specific Risk Factors include: multiple stressors  Access to Weapons: The patient has access to the following weapons: N/A   the following steps have been taken to ensure weapons are properly secured: N/A  Based on the above information, the client presents the following risk of harm to self or others: low  The following interventions are recommended: no intervention changes  Active Problems     1  Abnormal CBC (790 6) (R79 89)   2  Allergic rhinitis (477 9) (J30 9)   3  Asthma (493 90) (J45 909)   4  Asymptomatic menopausal state (V49 81) (Z78 0)   5  Breast cancer screening (V76 10) (Z12 39)   6  Bronchospasm (519 11) (J98 01)   7  Eczema (692 9) (L30 9)   8  Encounter for screening mammogram for malignant neoplasm of breast (V76 12)   (Z12 31)   9  Fatigue (780 79) (R53 83)   10  History of allergy (V15 09) (Z88 9)   11  Hyperlipidemia (272 4) (E78 5)   12  Laboratory examination ordered as part of a routine general medical examination    (V72 62) (Z00 00)   13  Major depressive disorder, recurrent, moderate (296 32) (F33 1)   14  Need for hepatitis B vaccination (V05 3) (Z23)   15  Need for prophylactic vaccination and inoculation against bacterial diseases (V03 9)    (Z23)   16  Need for prophylactic vaccination and inoculation against influenza (V04 81) (Z23)   17   Positive PPD (795 51) (R76 11)   18  Post-menopausal (V49 81) (Z78 0)   19  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)   20  Screening examination for pulmonary tuberculosis (V74 1) (Z11 1)   21  Vitamin D deficiency (268 9) (E55 9)    Severe recurrent major depression without psychotic features (296 33) (F33 2)       JOSE ROBERTO (generalized anxiety disorder) (300 02) (F41 1)          Past Medical History    1  History of A Fall (E888 9)   2  History of Acute upper respiratory infection (465 9) (J06 9)   3  History of Atelectasis (518 0) (J98 11)   4  History of Contusion Of The Chest Wall With Intact Skin Surface (922 1)   5  History of acute bronchitis (V12 69) (Z87 09)   6  History of acute bronchitis (V12 69) (Z87 09)   7  History of fatigue (V13 89) (Z87 898)   8  Denied: History of head injury   9  History of low back pain (V13 59) (Z87 39)   10  History of low back pain (V13 59) (Z87 39)   11  History of shortness of breath (V13 89) (Z87 898)   12  History of Joint pain, knee (719 46) (M25 569)   13  Need for hepatitis B vaccination (V05 3) (Z23)   14  Need for prophylactic vaccination and inoculation against bacterial diseases (V03 9)    (Z23)   15  Need for prophylactic vaccination and inoculation against influenza (V04 81) (Z23)   16  History of Palpitations (785 1) (R00 2)   17  History of 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)   18  Denied: History of Seizures   19  History of Subconjunctival hemorrhage, unspecified laterality (372 72) (H11 30)    Future Appointments    Date/Time Provider Specialty Site   04/18/2016 12:15 PM SHANTE Uriarte  Psychiatry [facility]   04/19/2016 11:45 AM SHANTE Uriarte  Psychiatry [facility]   04/20/2016 11:45 AM SHANTE Uriarte  [facility]   04/21/2016 11:45 AM SHANTE Uriarte   Psychiatry [facility]   04/22/2016 11:45 AM SHANTE Uriarte  [facility]iatry [facility]   05/05/2016 11:15 AM Odilia Cabrera, MS, APRN, PMHCNS_BS  [facility] THERAPISTS   06/29/2016 02:15 PM SHANTE Lim   Psychiatry [facility] 81     Signatures   Electronically signed by : Eduin Cotton LCSW; Apr 15 2016  3:34PM EST                       (Author)

## 2018-01-17 NOTE — PSYCH
Provider Comments  Provider Comments:   Pt had not arrived for 4:15pm Outpatient Psychotherapy joe't by 4:30pm, so this Therapist called her  Pt stated that she is sorry that she has not called, that she still is battling her Depression despite medications, despite past [facility], and despite Psychotherapy,and she had even been considering going to [facility] to try Treatment there  Pt describes multiple stressors today, including the fact that she and  just got back from their Autistic son's Dental joe't  in which son Nafisa Dotson) had Braces placed on his teeth--->that required special preparation of him, and calming interventions, and pt is relieved that it was accomplished  Listening, Support, Validation,and affirmation given to pt  on the phone, re: this accomplishment  Pt is still in the midst of her Social Security Disability case, and has much Financial stress  This Therapist encouraged pt to return to Therapy here,and she stated she does have some upcoming joe'ts  scheduled and she will make an effort to return to Therapy here  Pt also said that she has stomach distress , related to her new med  Wellbutrin, no vomiting or diarrhea noted, and information was shared with pt  re: giving the medication a decent trial- period to work with her system, and perhaps some soft food or few crackers prior to taking med, could prepare her stomach for ingestion of the medication  Pt to also see Dr Lul Rey again in 3 to 4 weeks, she stated  Pt to call us, if side effects worsen, or don't alleviate after a few weeks  Pt thanked this Therapist for the call --SHANTE Cabrera MS, APRN, [address]      Signatures   Electronically signed by : NATALY Castro-BC; Mar  1 2017  5:13PM EST                       (Author)

## 2018-01-17 NOTE — PSYCH
Psych Med Mgmt    Appearance: was calm and cooperative, adequate hygiene and grooming, demonstrated behavior psychomotor retardation and poor eye contact  Observed mood: depressed and anxious  Observed mood: affect was blunted  Speech: decreased volume, but speech soft  Thought processes: coherent/organized  Hallucinations: no hallucinations present  Thought Content: no delusions  Abnormal Thoughts: The patient has no suicidal thoughts and no homicidal thoughts  Orientation: The patient is oriented to person, place and time  Recent and Remote Memory: short term memory intact and long term memory intact  Attention Span And Concentration: concentration impaired  Insight: Insight intact  Judgment: Her judgment was intact  Muscle Strength And Tone  Muscle strength and tone were normal  Normal gait and station  Language: no difficulty naming common objects  Fund of knowledge: Patient displays adequate knowledge of current events, adequate fund of knowledge regarding past history and adequate fund of knowledge regarding vocabulary  The patient is experiencing moderate to severe pain  On a scale of 0 - 10 the pain severity is a 6  Treatment Recommendations: Continue Paxil 30 mg every evening due to tiredness  Restart Wellbutrin  mg daily (was given prescription for Wellbutrin  mg daily while in PHP but did not take due to loss of insurance)  Continue Xanax 0 5 mg tid PRN  Therapy with [place] [facility]  Does not want to restart Partial Program at this time  Risks, Benefits And Possible Side Effects Of Medications: Risks, benefits, and possible side effects of medications explained to patient and patient verbalizes understanding  She reports decreased appetite, decreased energy, recent 1 lbs weight gain  and increase in number of sleep hours 11       Patient attended [facility] Partial Program for 4 days in April 2016 - could not continue due to loss of her health insurance after her 's company went on strike  Now has her insurance back; states she has been experiencing depression on and off  Still stressed out with financial issues and taking care of her autistic son  Feels anxious often  Reports low energy and hypersomnia  No suicidal or homicidal ideation at present  Had passive death wish in April 2016, but denies any suicidal ideation at this time  No psychotic symptoms  No side effects from medications reported  PHQ-9 is increased today to 25 (from 14 in March 2016)  Vitals  Signs [Data Includes: Current Encounter]   Recorded: 99TBL4680 02:23PM   Heart Rate: 84  Systolic: 719  Diastolic: 75  Height: 5 ft 3 in  Weight: 167 lb   BMI Calculated: 29 58  BSA Calculated: 1 79    Assessment    1  JOSE ROBERTO (generalized anxiety disorder) (300 02) (F41 1)   2  Major depressive disorder, recurrent severe without psychotic features (296 33) (F33 2)    Plan    1  Follow-up Visit in 4 Weeks Evaluation and Treatment  Follow-up  Status: Hold For -   Scheduling  Requested for: 61NYF4423    2  BuPROPion HCl ER (XL) 150 MG Oral Tablet Extended Release 24 Hour; TAKE 1   TABLET DAILY    Review of Systems    Constitutional: recent 1 lb weight gain and feeling tired  Cardiovascular: no complaints of slow or fast heart rate, no chest pain, no palpitations  Respiratory: no complaints of shortness of breath, no wheezing, no dyspnea on exertion  Gastrointestinal: no complaints of abdominal pain, no constipation, no nausea, no diarrhea, no vomiting  Genitourinary: no complaints of dysuria, no incontinence, no pelvic pain, no urinary frequency  Musculoskeletal: shoulder pain and upper back pain  Integumentary: no complaints of skin rash, no itching, no dry skin  Neurological: no complaints of headache, no confusion, no numbness, no dizziness  Past Psychiatric History    Past Psychiatric History: No prior history of inpatient psychiatric treatment   Attended Partial Program in past one time  No history of past suicide attempts  Substance Abuse Hx    Substance Abuse History: Social alcohol use, no recreational drug use  Active Problems    1  Abnormal CBC (790 6) (R79 89)   2  Allergic rhinitis (477 9) (J30 9)   3  Asthma (493 90) (J45 909)   4  Asymptomatic menopausal state (V49 81) (Z78 0)   5  Breast cancer screening (V76 10) (Z12 39)   6  Bronchospasm (519 11) (J98 01)   7  Eczema (692 9) (L30 9)   8  Encounter for screening mammogram for malignant neoplasm of breast (V76 12)   (Z12 31)   9  Fatigue (780 79) (R53 83)   10  JOSE ROBERTO (generalized anxiety disorder) (300 02) (F41 1)   11  History of allergy (V15 09) (Z88 9)   12  Hyperlipidemia (272 4) (E78 5)   13  Laboratory examination ordered as part of a routine general medical examination    (V72 62) (Z00 00)   14  Need for hepatitis B vaccination (V05 3) (Z23)   15  Need for prophylactic vaccination and inoculation against bacterial diseases (V03 9)    (Z23)   16  Need for prophylactic vaccination and inoculation against influenza (V04 81) (Z23)   17  Positive PPD (795 51) (R76 11)   18  Post-menopausal (V49 81) (Z78 0)   19  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)   20  Screening examination for pulmonary tuberculosis (V74 1) (Z11 1)   21  Vitamin D deficiency (268 9) (E55 9)    Past Medical History    1  History of A Fall (E888 9)   2  History of Acute upper respiratory infection (465 9) (J06 9)   3  History of Atelectasis (518 0) (J98 11)   4  History of Contusion Of Chest Wall With Intact Skin Surface (922 1)   5  History of acute bronchitis (V12 69) (Z87 09)   6  History of acute bronchitis (V12 69) (Z87 09)   7  History of fatigue (V13 89) (Z87 898)   8  Denied: History of head injury   9  History of low back pain (V13 59) (Z87 39)   10  History of low back pain (V13 59) (Z87 39)   11  History of shortness of breath (V13 89) (Z87 898)   12  History of Joint pain, knee (719 46) (I36 708)   13   Need for hepatitis B vaccination (V05 3) (Z23)   14  Need for prophylactic vaccination and inoculation against bacterial diseases (V03 9)    (Z23)   15  Need for prophylactic vaccination and inoculation against influenza (V04 81) (Z23)   16  History of Palpitations (785 1) (R00 2)   17  History of 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)   18  Denied: History of Seizures   19  History of Subconjunctival hemorrhage, unspecified laterality (372 72) (H11 30)    The active problems and past medical history were reviewed and updated today  Allergies    1  No Known Drug Allergies    Current Meds   1  Advair Diskus 250-50 MCG/DOSE Inhalation Aerosol Powder Breath Activated; 1 PUFF   TWICE DAILY; RINSE MOUTH AFTER USE AS NEEDED; Therapy: 89LBF0470 to (Evaluate:52Djt9433)  Requested for: 21Apr2015; Last   Rx:21Apr2015 Ordered   2  ALPRAZolam 0 5 MG Oral Tablet; Take 1 tablet 3 times daily as needed; Therapy: 26OON1491 to (Evaluate:19Ylm0963)  Requested for: 12Apr2016; Last   Rx:02Mar2016; Status: ACTIVE - Renewal Denied Ordered   3  BuPROPion HCl ER (SR) 100 MG Oral Tablet Extended Release 12 Hour; Take 1 tablet   daily; Therapy: 54Hie0428 to (Evaluate:43Dej6191)  Requested for: 39Weq8317; Last   Rx:15Apr2016 Ordered   4  Calcium 1250 MG TABS; TAKE 1 TABLET DAILY; Therapy: 53ZIG4795 to Recorded   5  Fluticasone Propionate 50 MCG/ACT Nasal Suspension; USE 2 SPRAYS IN EACH   NOSTRIL ONCE DAILY; Therapy: 15SUD2188 to (Percy Martinez)  Requested for: 31DPG9507; Last   Rx:12May2015 Ordered   6  Multi-Vitamins Oral Tablet; TAKE 1 TABLET DAILY; Therapy: 54QQF4411 to Recorded   7  PARoxetine HCl - 30 MG Oral Tablet; TAKE 1 TABLET IN THE EVENING; Therapy: 82TIZ9130 to (Evaluate:90Roj1597)  Requested for: 74XVK0622; Last   Rx:02Mar2016 Ordered   8   ProAir  (90 Base) MCG/ACT Inhalation Aerosol Solution; INHALE 2 PUFFS   EVERY 4 HOURS AS NEEDED FOR COUGH AND WHEEZE;   Therapy: 31ZRQ8210 to (Evaluate:42Oad3344)  Requested for: 21Apr2015; Last   Rx:21Apr2015 Ordered   9  Verapamil HCl  MG Oral Capsule Extended Release 24 Hour; TAKE 1 CAPSULE   Weekly; Therapy: 88Vqt9112 to (Evaluate:23Jun2015) Recorded   10  Vitamin C 1000 MG Oral Tablet; TAKE 1 TABLET DAILY; Therapy: 32HXU8877 to Recorded   11  Vitamin D3 2000 UNIT Oral Capsule; TAKE 1 CAPSULE Daily; Therapy: 41AOD9492 to (Last Rx:25Oct2013) Ordered    The medication list was reviewed and updated today  Family Psych History  Mother    1  No family history of mental disorder  Father    2  No family history of mental disorder  Grandmother    3  Family history of depression (V17 0) (Z81 8)  Family History    4  Family history of Hodgkin Disease   5  Family history of Hypothyroidism   6  Family history of Osteoporosis (V17 81)   7  Family history of Stroke Syndrome (V17 1)    The family history was reviewed and updated today  Social History    · Being A Social Drinker   · College graduate   · Marital History - Currently    · Never A Smoker   · No drug use   · Denied: History of Tobacco Use   · Travel / Residence In [place]   · Denied: History of Using Intravenous Drugs   · Work History  The social history was reviewed and updated today  The social history was reviewed and is unchanged  , lives with  and 15year old adoptive son who is autistic  Unemployed, worked in office management  Applied for disability  Has bachelor's degree in communications  History Of Phys/Sex Abuse Or Perpetration    History Of Phys/Sex Abuse or Perpetration: No history of physical or sexual abuse  End of Encounter Meds    1  Advair Diskus 250-50 MCG/DOSE Inhalation Aerosol Powder Breath Activated; 1 PUFF   TWICE DAILY; RINSE MOUTH AFTER USE AS NEEDED; Therapy: 08OII1838 to (Evaluate:74Qxq8147)  Requested for: 21Apr2015; Last   Rx:21Apr2015 Ordered   2   Fluticasone Propionate 50 MCG/ACT Nasal Suspension; USE 2 SPRAYS IN EACH   NOSTRIL ONCE DAILY; Therapy: 52GGC4676 to ([facility])  Requested for: 11DLO4269; Last   Rx:74Cfz9326 Ordered    3  ProAir  (90 Base) MCG/ACT Inhalation Aerosol Solution; INHALE 2 PUFFS   EVERY 4 HOURS AS NEEDED FOR COUGH AND WHEEZE;   Therapy: 14HXA9333 to (Evaluate:55Ssu6163)  Requested for: 21Apr2015; Last   Rx:21Apr2015 Ordered    4  ALPRAZolam 0 5 MG Oral Tablet (Xanax); Take 1 tablet 3 times daily as needed; Therapy: 79MTN5654 to (Evaluate:02Slo3129)  Requested for: 12Apr2016; Last   Rx:02Mar2016; Status: ACTIVE - Renewal Denied Ordered    5  PARoxetine HCl - 30 MG Oral Tablet; TAKE 1 TABLET IN THE EVENING; Therapy: 08KNY9672 to (Evaluate:76Ffo9068)  Requested for: 61HDK3592; Last   Rx:02Mar2016 Ordered    6  Calcium 1250 MG TABS; TAKE 1 TABLET DAILY; Therapy: 45JIN3917 to Recorded   7  Multi-Vitamins Oral Tablet; TAKE 1 TABLET DAILY; Therapy: 64DCX7377 to Recorded   8  Vitamin C 1000 MG Oral Tablet; TAKE 1 TABLET DAILY; Therapy: 50RPL2363 to Recorded    9  BuPROPion HCl ER (XL) 150 MG Oral Tablet Extended Release 24 Hour; TAKE 1   TABLET DAILY; Therapy: 19EBA8110 to (Evaluate:95Zla9862)  Requested for: 95RVO3394; Last   Rx:29Jun2016 Ordered    10  Verapamil HCl  MG Oral Capsule Extended Release 24 Hour; TAKE 1 CAPSULE    Weekly; Therapy: 11Sgk2969 to (Evaluate:23Jun2015) Recorded    11  BuPROPion HCl ER (SR) 100 MG Oral Tablet Extended Release 12 Hour; Take 1 tablet    daily; Therapy: 34Wtx9835 to (Evaluate:53Qqe8555)  Requested for: 15Apr2016; Last    Rx:15Apr2016 Ordered    12  Vitamin D3 2000 UNIT Oral Capsule; TAKE 1 CAPSULE Daily;     Therapy: 20VLX0857 to (Last PC:20GVT2585) Ordered    Signatures   Electronically signed by : Myrene Fothergill, M D ; Jun 29 2016  2:37PM EST                       (Author)

## 2018-01-17 NOTE — PSYCH
Progress Note  Psychotherapy Provided [facility]: Individual Psychotherapy 50 minutes provided today  Goals addressed in session:   Goal #1  D: " I have little motivation or drive, to organize my house, clear out my papers or put them in files, or to get this TAX burden , on me so long, RESOLVED!"   " But, we do have SOME financial relief, because I did get a lump-sum payment from [employer]---so we paid our monthly mortgage payment, we put the rest in [place] I'm going to try to pay off my  bills,and pay some other bills---we've never been 6 years behind in Taxes before----and it has really been stressing Cooper Ramirez and me "   " Tomorrow, Cooper Ramirez and Francesco and I leave for [facility], a 5 -day vacation, before Domenico Pérez starts high school on August 28  [employer] [place] [employer] I will try to clear my head of stress, BREATHE, and so will Cooper Ramirez"    "Cooper Ramirez just had his 27 year anniversary of working for [employer], a major feat, for that company---I am proud of him!"   Pt has a depressed affect and mood, but she is slightly brighter re: her hopefulness and mood, compared to previously  Denies BRENNAN Eugene [address]  Processes her stressors  Processes her thoughts,feelings,and behaviors  She decides that she will try to breathe and relax on their 5-day vacation to [facility]  Then , she will organize her kitchen, her Fall schedule, weekly shopping day, cooking days, exercise at the gym 2 to 3 times a week---and she will get ALL Tax info her Tax Chris Penger needs, to him---her target is to be done worrying about back-taxes by the end of September---and have it in her 's hands/ responsibility re:the final monthly tax payments she and Cooper Ramirez will make to Gov't,for quite a while  Pt is assisted with Cogntive-reframing,and Deep-breathing/ relaxation exercises  Support given  She continues meds as prescribed by Psychiatrist   A: Major Depressive Disorder, recurrent, F33 2; Generalized Anxiety Disorder , F41 10    P: Continue Treatment Plan, Meds,and Psychotherapy  Pain Scale and Suicide Risk [facility]: Current Pain Assessment: moderate to severe   On a scale of 0 to 10, the patient rates current pain at 1   Current suicide risk is low   Behavioral Health Treatment Plan [facility]: Diagnosis and Treatment Plan explained to patient, patient relates understanding diagnosis and is agreeable to Treatment Plan  Assessment    1  Major depressive disorder, recurrent severe without psychotic features (296 33) (F33 2)   2   JOSE ROBERTO (generalized anxiety disorder) (300 02) (F41 1)    Signatures   Electronically signed by : DORITA AragonNPMHCNS-BC; Aug 17 2017  5:32PM EST                       (Author)

## 2018-01-17 NOTE — PSYCH
Treatment Plan Tracking    #1 Treatment Plan not completed within required time limits due to: Client presented with emotional/behavioral issues that required clinical intervention            Signatures   Electronically signed by : Ree Abreu MSAPRNPMHCNARIADNE; Aug 17 2017  5:16PM EST                       (Author)

## 2018-01-17 NOTE — PSYCH
Psych Med Mgmt    Appearance: was calm and cooperative, adequate hygiene and grooming, demonstrated behavior psychomotor retardation and poor eye contact  Observed mood: depressed and anxious  Observed mood: affect was blunted  Speech: monotonous, but speech soft  Thought processes: coherent/organized  Hallucinations: no hallucinations present  Thought Content: no delusions  Abnormal Thoughts: The patient has no suicidal thoughts and no homicidal thoughts  Orientation: The patient is oriented to person, place and time  Recent and Remote Memory: short term memory intact and long term memory intact  Attention Span And Concentration: concentration impaired  Insight: Insight intact  Judgment: Her judgment was intact  Muscle Strength And Tone  Muscle strength and tone were normal  Normal gait and station  Language: no difficulty naming common objects, no difficulty repeating a phrase and no difficulty writing a sentence  Fund of knowledge: Patient displays adequate knowledge of current events, adequate fund of knowledge regarding past history and adequate fund of knowledge regarding vocabulary  The patient is experiencing moderate to severe pain  On a scale of 0 - 10 the pain severity is a 5  Individual Psychotherapy 20 minutes provided today  Goals addressed in session: Counseling provided during the session today  Discussed with patient coping with financial issues and son's illness  Coping skills reviewed with patient  Support provided  Treatment Recommendations: Continue Paxil 30 mg every evening  Increase Wellbutrin XL to 450 mg daily  Continue Xanax 0 5 mg tid PRN  Therapy with Hedley Piles  Still does not want to attend [facility]      Risks, Benefits And Possible Side Effects Of Medications: Risks, benefits, and possible side effects of medications explained to patient and patient verbalizes understanding             Discussed with patient the risks of sedation, respiratory depression, impairment of ability to drive and potential for abuse and addiction related to treatment with benzodiazepine medications  The patient understands risk of treatment with benzodiazepine medications, agrees to not drive if feels impaired and agrees to take medications as prescribed  The patient has been filling controlled prescriptions on time as prescribed to [facility] [employer]       She reports normal appetite, decreased energy, recent 4 lbs weight gain  and increase in number of sleep hours 10  Aj \[facility] [place] [facility]\"" states she still has significant depressive symptoms and anxiety  Continues to have frequent racing thoughts "I can't finish anything"  Feels hopeless at times  No suicidal or homicidal ideation  No psychotic symptoms  Still has increased sleep  Appetite is adequate  No side effects from medications reported  PHQ-9 is slightly increased at 24 today (from 23 at the last visit)  Vitals  Signs   Recorded: 10MVV5362 02:31PM   Heart Rate: 73  Systolic: 971  Diastolic: 82  BP Cuff Size: Large  Height: 5 ft 3 in  Weight: 171 lb   BMI Calculated: 30 29  BSA Calculated: 1 81    Assessment    1  JOSE ROBERTO (generalized anxiety disorder) (300 02) (F41 1)   2  Major depressive disorder, recurrent severe without psychotic features (296 33) (F33 2)    Plan    1  Follow-up visit in 5 months Evaluation and Treatment  Follow-up  Status: Hold For -   Scheduling  Requested for: [id number]    2  From  BuPROPion HCl ER (XL) 300 MG Oral Tablet Extended Release 24   Hour TAKE 1 TABLET DAILY To BuPROPion HCl ER (XL) 150 MG Oral Tablet Extended   Release 24 Hour TAKE 3 TABLET Daily    Review of Systems    Constitutional: recent 4 lb weight gain and feeling tired  Cardiovascular: no complaints of slow or fast heart rate, no chest pain, no palpitations  Respiratory: no complaints of shortness of breath, no wheezing, no dyspnea on exertion     Gastrointestinal: no complaints of abdominal pain, no constipation, no nausea, no diarrhea, no vomiting  Genitourinary: no complaints of dysuria, no incontinence, no pelvic pain, no urinary frequency  Musculoskeletal: knee pain  Integumentary: no complaints of skin rash, no itching, no dry skin  Neurological: no complaints of headache, no confusion, no numbness, no dizziness  Past Psychiatric History    Past Psychiatric History: No prior history of inpatient psychiatric treatment  Attended Partial Program in past one time  No history of past suicide attempts  Substance Abuse Hx    Substance Abuse History: Social alcohol use, no recreational drug use  Active Problems    1  Abnormal CBC (790 6) (R79 89)   2  Allergic rhinitis (477 9) (J30 9)   3  Asthma (493 90) (J45 909)   4  Asymptomatic menopausal state (V49 81) (Z78 0)   5  Breast cancer screening (V76 10) (Z12 39)   6  Bronchospasm (519 11) (J98 01)   7  Eczema (692 9) (L30 9)   8  Encounter for screening mammogram for malignant neoplasm of breast (V76 12)   (Z12 31)   9  Fatigue (780 79) (R53 83)   10  JOSE ROBERTO (generalized anxiety disorder) (300 02) (F41 1)   11  History of allergy (V15 09) (Z88 9)   12  Hyperlipidemia (272 4) (E78 5)   13  Laboratory examination ordered as part of a routine general medical examination    (V72 62) (Z00 00)   14  Major depressive disorder, recurrent severe without psychotic features (296 33) (F33 2)   15  Marital conflict (A35 74) (U37 9)   16  Need for hepatitis B vaccination (V05 3) (Z23)   17  Need for prophylactic vaccination and inoculation against bacterial diseases (V03 9)    (Z23)   18  Need for prophylactic vaccination and inoculation against influenza (V04 81) (Z23)   19  Positive PPD (795 51) (R76 11)   20  Post-menopausal (V49 81) (Z78 0)   21  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)   22  Screening examination for pulmonary tuberculosis (V74 1) (Z11 1)   23   Vitamin D deficiency (268 9) (E55 9)    Past Medical History    1  History of A Fall (E888 9)   2  History of Acute upper respiratory infection (465 9) (J06 9)   3  History of Atelectasis (518 0) (J98 11)   4  History of Contusion Of Chest Wall With Intact Skin Surface (922 1)   5  History of acute bronchitis (V12 69) (Z87 09)   6  History of acute bronchitis (V12 69) (Z87 09)   7  History of fatigue (V13 89) (Z87 898)   8  Denied: History of head injury   9  History of low back pain (V13 59) (Z87 39)   10  History of low back pain (V13 59) (Z87 39)   11  History of shortness of breath (V13 89) (Z87 898)   12  History of Joint pain, knee (719 46) (M25 569)   13  Need for hepatitis B vaccination (V05 3) (Z23)   14  Need for prophylactic vaccination and inoculation against bacterial diseases (V03 9)    (Z23)   15  Need for prophylactic vaccination and inoculation against influenza (V04 81) (Z23)   16  History of Palpitations (785 1) (R00 2)   17  History of 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)   18  Denied: History of Seizures   19  History of Subconjunctival hemorrhage, unspecified laterality (372 72) (H11 30)    The active problems and past medical history were reviewed and updated today  Allergies    1  No Known Drug Allergies    Current Meds   1  Advair Diskus 250-50 MCG/DOSE Inhalation Aerosol Powder Breath Activated; 1 PUFF   TWICE DAILY; RINSE MOUTH AFTER USE AS NEEDED; Therapy: 91YNB6384 to (Evaluate:26Vlb9046)  Requested for: 09Rlz7819; Last   Rx:60Tso2496 Ordered   2  ALPRAZolam 0 5 MG Oral Tablet; Take 1 tablet 3 times daily as needed; Therapy: 01KSL9862 to (Jean Claude Villa)  Requested for: 11Wsl2911; Last   Rx:44Pzd8123; Status: ACTIVE - Renewal Denied Ordered   3  BuPROPion HCl ER (SR) 100 MG Oral Tablet Extended Release 12 Hour; Take 1 tablet   daily; Therapy: 60Awb0239 to (Evaluate:36Los7075)  Requested for: 98Ikl5762; Last   Rx:04Byn4036 Ordered   4   BuPROPion HCl ER (XL) 300 MG Oral Tablet Extended Release 24 Hour; TAKE 1 TABLET DAILY; Therapy: 87SNF7603 to (Evaluate:04Jan2017)  Requested for: 40ZZC4549; Last   Rx:22Jom4299 Ordered   5  Calcium 1250 MG TABS; TAKE 1 TABLET DAILY; Therapy: 97WEZ6850 to Recorded   6  Fluticasone Propionate 50 MCG/ACT Nasal Suspension; USE 2 SPRAYS IN EACH   NOSTRIL ONCE DAILY; Therapy: 16HPG0924 to (Becka Sewell)  Requested for: 93ZBL9759; Last   Rx:24Njs6727 Ordered   7  Multi-Vitamins Oral Tablet; TAKE 1 TABLET DAILY; Therapy: 22XTT5527 to Recorded   8  PARoxetine HCl - 30 MG Oral Tablet; TAKE 1 TABLET IN THE EVENING; Therapy: 12UZE6884 to (Lazarus Lauber)  Requested for: 07AQY9282; Last   Rx:63Tpb1451 Ordered   9  ProAir  (90 Base) MCG/ACT Inhalation Aerosol Solution; INHALE 2 PUFFS   EVERY 4 HOURS AS NEEDED FOR COUGH AND WHEEZE;   Therapy: 60NSD3767 to (Evaluate:24Tmk4238)  Requested for: 83Wuy3703; Last   Rx:40Kyh2617 Ordered   10  Verapamil HCl  MG Oral Capsule Extended Release 24 Hour; TAKE 1 CAPSULE    Weekly; Therapy: 80Vqz5068 to (Evaluate:24Ciz4758) Recorded   11  Vitamin C 1000 MG Oral Tablet; TAKE 1 TABLET DAILY; Therapy: 23XZW4488 to Recorded   12  Vitamin D3 2000 UNIT Oral Capsule; TAKE 1 CAPSULE Daily; Therapy: 04BAF9889 to (Last Rx:45Ngi3199) Ordered    The medication list was reviewed and updated today  Family Psych History  Mother    1  No family history of mental disorder  Father    2  No family history of mental disorder  Grandmother    3  Family history of depression (V17 0) (Z81 8)  Family History    4  Family history of Hodgkin Disease   5  Family history of Hypothyroidism   6  Family history of Osteoporosis (V17 81)   7  Family history of Stroke Syndrome (V17 1)    The family history was reviewed and updated today         Social History    · Being A Social Drinker   · College graduate   · Marital conflict (R58 88) (T99 6)   · Marital History - Currently    · Never A Smoker   · No drug use   · Denied: History of Tobacco Use · Travel / Residence In [place]   · Denied: History of Using Intravenous Drugs   · Work History  The social history was reviewed and updated today  The social history was reviewed and is unchanged  , lives with  and 15year old adoptive son who is autistic  Unemployed, worked in office management  Applied for disability  Has bachelor's degree in communications  History Of Phys/Sex Abuse Or Perpetration    History Of Phys/Sex Abuse or Perpetration: No history of physical or sexual abuse  End of Encounter Meds    1  Advair Diskus 250-50 MCG/DOSE Inhalation Aerosol Powder Breath Activated; 1 PUFF   TWICE DAILY; RINSE MOUTH AFTER USE AS NEEDED; Therapy: 76SHP3994 to (Evaluate:65Pop4760)  Requested for: 63Bpl9303; Last   Rx:87Syi8571 Ordered   2  Fluticasone Propionate 50 MCG/ACT Nasal Suspension; USE 2 SPRAYS IN EACH   NOSTRIL ONCE DAILY; Therapy: 12VHF3801 to (Colan Ormond)  Requested for: 57PXU0000; Last   Rx:41Xtq2276 Ordered    3  ProAir  (90 Base) MCG/ACT Inhalation Aerosol Solution; INHALE 2 PUFFS   EVERY 4 HOURS AS NEEDED FOR COUGH AND WHEEZE;   Therapy: 73CPY8655 to (Evaluate:58Pkh5178)  Requested for: 55Qaf2551; Last   Rx:17Yeb4194 Ordered    4  ALPRAZolam 0 5 MG Oral Tablet (Xanax); Take 1 tablet 3 times daily as needed; Therapy: 16FED3955 to ([facility])  Requested for: 89Tsg0590; Last   Rx:36Pej3949; Status: ACTIVE - Renewal Denied Ordered    5  PARoxetine HCl - 30 MG Oral Tablet; TAKE 1 TABLET IN THE EVENING; Therapy: 61IGK1909 to ([facility])  Requested for: 33MRW7365; Last   Rx:56Hlr5023 Ordered    6  Calcium 1250 MG TABS; TAKE 1 TABLET DAILY; Therapy: 96RMH6408 to Recorded   7  Multi-Vitamins Oral Tablet; TAKE 1 TABLET DAILY; Therapy: 46SEP1540 to Recorded   8  Vitamin C 1000 MG Oral Tablet; TAKE 1 TABLET DAILY; Therapy: 73KIS7097 to Recorded    9   BuPROPion HCl ER (XL) 150 MG Oral Tablet Extended Release 24 Hour; TAKE 3   TABLET Daily; Therapy: 72IYJ4830 to (Aurora Roberts)  Requested for: 87UUE4139; Last   Rx:05Jan2017 Ordered    10  Verapamil HCl  MG Oral Capsule Extended Release 24 Hour; TAKE 1 CAPSULE    Weekly; Therapy: 74Mhl5034 to (Evaluate:23Jun2015) Recorded    11  BuPROPion HCl ER (SR) 100 MG Oral Tablet Extended Release 12 Hour; Take 1 tablet    daily; Therapy: 15Apr2016 to (Evaluate:26Rez8661)  Requested for: 15Apr2016; Last    Rx:15Apr2016 Ordered    12  Vitamin D3 2000 UNIT Oral Capsule; TAKE 1 CAPSULE Daily;     Therapy: 72VPA6945 to (Last JK:08LSO2156) Ordered    Signatures   Electronically signed by : SHANTE Dubois ; Jan 5 2017  2:45PM EST                       (Author)

## 2018-01-17 NOTE — PSYCH
Progress Note  Psychotherapy Provided [facility]: Individual Psychotherapy 45 minutes provided today  Goals addressed in session:   Goal #1  D: "It wasn't a good day for me this morning---I'm glad you called "   "I have tension in my neck, my head, and I get it in my chest sometimes, like Saturday I had palpitations from my Mitral Valve Prolapse, and I was exhausted"   " We have houseguests for a month, My Aunt and my Cousin from the [place] had to get away from the [facility], so they are at my house Until November 28   "   " Then , I have Annmarie Covarrubias, who needs everything told to him, step-by-step   and my  Genaro---he's so distractable, and he's under pressure at his job at [employer] was suspended,and then he's back at the job   he's been there 27 years,and he thinks they are trying to fire him, because he doesn't work as fast as the younger people, he's meticulous"   " I'm helping him to get tested for ADHD or even some Aspberger's---his brother and his nephew have those issues  Shelruthiey Minjack Shelvy Minlorriee I want to help him protect his job!"   "Our taxes haven't been paid for 7 years, either, another thing I'm working on   "   Pt Fabrice Schaffer) was late today,and she hadn't received a Reminder Call via computer---she came in as soon as this Therapist called her  She has an anxious affect  Moderately depressed mood  She has no evidence of current SI /HI/ AH ? VH  But she has intense anxiety and pressure  She processes her current stressors, especially dealing with company for a month, and dealing with her  Yin Kyle her son Annmarie Covarrubias  We do Cognitive -reframing,and problem -solving  She and  [facility] an [facility] his Neuropsych testing completed  She will ask for some help from relatives, re: the houseguests  Next week ,she will put some papers together for her    She expresses financial relief re: her Disability pay helps to pay the Mortgage, now---no threat of losing their home  We review Calm, Deep-Breathing/ Visualization exercises,and she will do each day  Listening, Support,and Validation also given in session  A: Major Depressive Disorder, recurrent, F33 2; Generalized Anxiety Disorder, F41 10, and family stressorsTax pressures,and houseguest stress  P: Continue Treatment Plan, Meds,and Psychotherapy  Pain Scale and Suicide Risk [facility]: Current Pain Assessment: moderate to severe   On a scale of 0 to 10, the patient rates current pain at 3   Current suicide risk is low   Behavioral Health Treatment Plan [facility]: Diagnosis and Treatment Plan explained to patient, patient relates understanding diagnosis and is agreeable to Treatment Plan  Assessment    1  Major depressive disorder, recurrent severe without psychotic features (296 33) (F33 2)   2   JOSE ROBERTO (generalized anxiety disorder) (300 02) (F41 1)    Signatures   Electronically signed by : ELISABET Garcia; Nov 1 2017 12:50PM EST                       (Author)    Electronically signed by : ELISABET Garcia; Nov 1 2017  2:34PM EST                       (Author)    Electronically signed by : ELISABET Garcia; Nov 1 2017  3:10PM EST                       (Author)    Electronically signed by : ELISABET Garcia; Nov 1 2017  8:57PM EST                       (Author)    Electronically signed by : ELISABET Garcia; Nov 1 2017  9:35PM EST                       (Author)    Electronically signed by : ELISABET Garcia; Nov 2 2017 11:34PM EST                       (Author)

## 2018-01-17 NOTE — PSYCH
Assessment    1  Severe recurrent major depression without psychotic features (296 33) (F33 2)   2  JOSE ROBETRO (generalized anxiety disorder) (300 02) (F41 1)    [facility] Treatment Plan    [facility] Treatment Plan   CHALLENGES/PROBLEMS/NEEDS: Depressed mood evidenced by recent suicidal thoughts to overdose, crying spells, poor motivation, low energy, social withdrawal, hopelessness/helplessness, anxiety, and poor focus related to financial concerns and the care needs of her son  Date Initiated: 4/15/16     LONG TERM GOAL: 1 0 Stable mood and improved symptoms   Date Initiated: 4/15/16   Target Date: 16      Date Initiated: 4/15/16    1 1 I will identify and practice 3 things I need to do every day to promote my wellness and self care   Target Date: 16    Date Initiated: 4/15/16    1 2 I will identify my main current life stressor along with 2 healthy ways to manage it   Target Date: 16    Date Initiated: 4/15/16    1 3 I will verbalize knowledge of my prescribed medications by name, dose, reason prescribed, and the importance of taking them daily   Target Date: 16    Date Initiated: 4/15/16    1 4 I will agree to contact with my  if requested by my    Target Date: 16                             PSYCHIATRY: Medication management and education  Date Initiated: 4/15/2016      NURSIN 1,1 2,1 3,1 4 Provide education on S/S of depression and anxiety and medications used in treatment  EM[facility]   Date Initiated: 4/15/16        PSYCHOLOGY: 1 1, 1 2 Group therapy 5x/week to offer opportunity to identify and discuss issues and coping  [facility]   Date Initiated: 4/15/16      ALLIED THERAPY: 1 1, 1 2 AT daily to encourage development and practice of wellness tools to decrease s/s and promote recovery through meaningful tasks   [facility]     Date Initiated: 4/15/16            CASE MANAGEMENT: 1 1,1 2,1 3,1 4 Meet with pt  2-3x/week to assess progress, medication compliance, offer support and discuss discharge planning  [facility]   Date Initiated: 4/15/16          DISCHARGE CRITERIA: Stable mood and improved symptoms     DISCHARGE PLAN: Dr Greta Servin and Nuris Garcia MS, APRN       Estimated Length of Stay: 12-15 days     Strengths: advocate for son; college graduate     Limitations: overwhelmed with stressors     CLIENT COMMENTS / Please share your thoughts, feelings, need and/or experiences regarding your treatment plan: _____________________________________________________________________________________________________________________________________________________________________________________________________________________________________________________________________________________________________________________ Date/Time: ______________     Patient Signature: _________________________________ Date/Time: ______________      Signatures   Electronically signed by : SHANTE Awan ; Apr 15 2016 10:42AM EST                       (Author)    Electronically signed by : ALEYDA Garcia;  Apr 15 2016  1:29PM EST                       (Author)    Electronically signed by : Noam Vásquez LCSW; Apr 15 2016  4:12PM EST                       (Author)    Electronically signed by : Robert Howard RN; Apr 15 2016  4:20PM EST                       (Author)

## 2018-01-18 NOTE — PSYCH
Progress Note  Psychotherapy Provided [facility]: Individual Psychotherapy 50 minutes provided today  Goals addressed in session:   Goal #1  D: " I started an Exercise Class today,and it actually does make me feel a little better "   "I'm starting Abilify 2 mg,also  Leroy Castillo I have to pick it up from the Pharmacy"'      " Looking back, my Depression and Anxiety have been with me for many years---but realizing, then, how severe Francesco's Autism was, after we had already adopted him, and then all the things I have had to do , doing his physical care every single day, advocating for him with his schools,and getting his Therapies and Special Summer programs , and Equine Therapy---and knowing he will never really be cured of this, plus losing my job in the [employer] at [place], and the severe Financial stress and burden that Justin and I feel   everything has been crashing down on me, for quite some time " "I'm trying the approach of doing one goal per day,and giving myself some positives,and prayer helps me----but [place] doesn't always understand, he's working so hard to make ends meet "   " I don't want to see his brother or relatives right now---so I'm sending Justin and John Madrid, together, on the plane May 27, to go visit my 's brother in [place]"   ' I will SO cherish some time to myself for a few days "   Pt has an anxious and depressed affect  Depressed mood, but she is not despondent today  PHQ9= 14  Pt denies any current [facility]  Pt states that her physical Exercise Class, which she started today, helped to boost her energy and mood slightly---pt is given affirmation for same,and she is able to give self-affirmation  Pt processes her many stressors, especially severe Financial stress,which impacts her marriage at this time,and is the cause of many conflicts with her hard-working  Justin  Pt feels badly that she does not have the stamina, motivation, or energy for an outside job   but she is totally "drained" by the daily physical, emotional,and coordination of her adopted autistic son Francesco's care  Pt also worries about his future  Pt is given Active Listening, Support, and Validation, in session, and we also do Cognitive-reframing re: pt's Survivor strengths and her abilities  Pt to give a self-affirmation, each day  Pt finds support among her [place]-based Couples Group meetings,and her personal prayer, and her attendance at [facility] services with her   Pt has internal stress and anxiety re: an upcoming 's Hearing of her Social Security Disability case (?June 5)----pt to do Deep Breathing/ Mindfulness exercises each day, and she is putting her case,and her Trust, in the hands of her , as pt gets memory lapses,at times, with her depression and anxiety  A: Major Depressive Disorder, recurrent, severe, F33 2; Generalized Anxiety Disorder, f41 10; severe Financial and Family Stressors  P: Continue Treatment Plan, Meds,and Psychotherapy,and Positive Coping Skills, including her new Exercise Class in her community  Pain Scale and Suicide Risk [facility]: Current Pain Assessment: moderate to severe   On a scale of 0 to 10, the patient rates current pain at 1   Current suicide risk is low   Behavioral Health Treatment Plan [facility]: Diagnosis and Treatment Plan explained to patient, patient relates understanding diagnosis and is agreeable to Treatment Plan  Results/Data  PHQ-9 Adult Depression Screening [id number] 05:17PM [facility]     Test Name Result Flag Reference   PHQ-9 Adult Depression Score 14     Over the last two weeks, how often have you been bothered by any of the following problems?   Little interest or pleasure in doing things: More than half the days - 2  Feeling down, depressed, or hopeless: More than half the days - 2  Trouble falling or staying asleep, or sleeping too much: More than half the days - 2  Feeling tired or having little energy: More than half the days - 2  Poor appetite or over eating: Not at all - 0  Feeling bad about yourself - or that you are a failure or have let yourself or your family down: More than half the days - 2  Trouble concentrating on things, such as reading the newspaper or watching television: More than half the days - 2  Moving or speaking so slowly that other people could have noticed  Or the opposite -  being so fidgety or restless that you have been moving around a lot more than usual: Several days - 1  Thoughts that you would be better off dead, or of hurting yourself in some way: Several days - 1   PHQ-9 Adult Depression Screening Positive     PHQ-9 Difficulty Level Very difficult     PHQ-9 Severity Moderate Depression         Assessment    1  Major depressive disorder, recurrent severe without psychotic features (296 33) (F33 2)   2  JOSE ROBERTO (generalized anxiety disorder) (300 02) (F41 1)   3   Marital conflict (O11 03) (P78 1)    Signatures   Electronically signed by : TAMARA JarrettMHCNS-BC; May 17 2017  9:57PM EST                       (Author)    Electronically signed by : NATALY Jarrett-BC; May 17 2017  9:58PM EST                       (Author)

## 2018-01-18 NOTE — PSYCH
Date of Initial Treatment Plan: ( Chart not available  )  Date of Current Treatment Plan: 10 /19 /17  Treatment Plan 12  Strengths/Personal Resources for Self Care: I take care of my Physical Health and [facility] 44  I worked 23 yrs  at [employer], until changes / downsizing occurred, in 2008  I am a good caregiver of my son "Shannen Lazo", whom we adopted at 18mos, and who has Autism  Diagnosis:   Axis I: Major Depressive Disorder, recurrent, F33 2; and Generalized Anxiety Disorder, F41  10  Axis II: Deferred  Axis III: Mitral Valve Prolapse; Hypercholesteremia,and others  Area of Needs: I want to be more in control of my life  ( I feel overwhelmed and disorganized, at times  )  Long Term Goals:   I will be in control of what I CAN be in control of  I will feel more organized, not as overwhelmed  Target Date: 2/ 23 / 2018  Short Term Objectives:   Goal 1:   I participate in Psychotherapy  I take my Meds  as prescribed by Dr Grzegorz Mathur  In Therapy, I identify my stressors,and some "roadblocks" to being and feeling more organized and in control---mainly these are my son Shannen Lazo, and sometimes my  Cindy Arango)  I process my thoughts, feelings,and behaviors  I am Organizing paperwork for getting our Back- Taxes paid  I also completed Francesco's paperwork for [employer] (funding for activities for Autistic children),and I helped Shannen Lazo to transition to [employer]  I am relieved that I was approved for Social Security Disability, as a Financial Safety Net---we have some Financial relief  If I get automatic negative thoughts, I will do Cognitive-reframing, focussing on my strengths and abilities, and also focussing on some positives or blessings in my life  I will Assertively communicate with my  Cindy Arango), and get things done at home  I will continue to prioritize my own Health, too  I am going to increase my own Social Supports, and go to an event with a girl friend   I am also preparing for Company (family members from [facility], escaping [place] they are staying for 5 weeks---I will use my Positive Coping skills, diplomacy skills,and will do my Deep-Breathing/ Relaxation exercises each day  Target Date: 2 /19/ 18  GOAL 1: Modality: Individual 2 x per month Target Date: 2 /19/ 25  GOAL 1: Modality: Couples Offered  x per month    GOAL 1: Modality: Medication Management 1 x per month Target Date: Ongoing m   The person(s) responsible for carrying out the plan is Client: Mariah Cote; Therapist: Fabian Pearson; Psychiatrist: Dr Jimenez Leyva  The first scheduled review date is 2 /19/ 25       The expected length of service is 3 to 4 more months       Level of functioning at initial assessment: 50  The highest level of functioning in the past year was 60  The current level of functioning is 60  CLIENT COMMENTS / Please share your thoughts, feelings, need and/or experiences regarding your treatment plan: _____________________________________________________________________________________________________________________________________________________________________________________________________________________________________________________________________________________________________________________ Date/Time: ______________     Patient Signature: _________________________________ Date/Time: ______________        1  Abnormal CBC (790 6) (R79 89)   2  Allergic rhinitis (477 9) (J30 9)   3  Asthma (493 90) (J45 909)   4  Asymptomatic menopausal state (V49 81) (Z78 0)   5  Breast cancer screening (V76 10) (Z12 39)   6  Bronchospasm (519 11) (J98 01)   7  Eczema (692 9) (L30 9)   8  Encounter for screening mammogram for malignant neoplasm of breast (V76 12)   (Z12 31)   9  Fatigue (780 79) (R53 83)   10  JOSE ROBERTO (generalized anxiety disorder) (300 02) (F41 1)   11  History of allergy (V15 09) (Z88 9)   12   Hyperlipidemia (272  4) (E78 5)   13  Laboratory examination ordered as part of a routine general medical examination    (V72 62) (Z00 00)   14  Major depressive disorder, recurrent severe without psychotic features (296 33) (F33 2)   15  Marital conflict (A33 91) (C40 5)   16  Need for hepatitis B vaccination (V05 3) (Z23)   17  Need for prophylactic vaccination and inoculation against bacterial diseases (V03 9)    (Z23)   18  Need for prophylactic vaccination and inoculation against influenza (V04 81) (Z23)   19  Positive PPD (795 51) (R76 11)   20  Post-menopausal (V49 81) (Z78 0)   21  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)   22  Screening examination for pulmonary tuberculosis (V74 1) (Z11 1)   23   Vitamin D deficiency (268 9) (E55 9)     Electronically signed by : Mehrdad Moore MSAPRNPMHCNS-BC; Oct 19 2017  6:13PM EST                       (Author)

## 2018-01-18 NOTE — PSYCH
Psych Med Mgmt    Appearance: adequate hygiene and grooming, demonstrated behavior psychomotor retardation and poor eye contact  Observed mood: depressed and anxious  Observed mood: affect was blunted  Speech: speech soft  Thought processes: coherent/organized  Hallucinations: no hallucinations present  Thought Content: no delusions  Abnormal Thoughts: The patient has no suicidal thoughts and no homicidal thoughts  Orientation: The patient is oriented to person, place and time  Recent and Remote Memory: short term memory impaired and long term memory intact  Attention Span And Concentration: concentration impaired  Insight: Insight intact  Judgment: Her judgment was intact  Muscle Strength And Tone  Muscle strength and tone were normal    Language: no difficulty naming common objects, no difficulty repeating a phrase and no difficulty writing a sentence  Fund of knowledge: Patient displays adequate knowledge of current events, adequate fund of knowledge regarding past history and adequate fund of knowledge regarding vocabulary  The patient is experiencing moderate to severe pain  On a scale of 0 - 10 the pain severity is a 5  Individual Psychotherapy 20 minutes provided today  Goals addressed in session: Counseling provided during the session today  Discussed with patient coping with ongoing anxiety, marital issues and financial problems  Coping skills reviewed with patient  She plans to start exercising  Support provided  Treatment Recommendations: Continue Paxil 30 mg every evening  Increase Wellbutrin XL to 300 mg daily  Continue Xanax 0 5 mg tid PRN  Therapy with Bushra Alfredo  Does not want to attend [facility]      Risks, Benefits And Possible Side Effects Of Medications: Risks, benefits, and possible side effects of medications explained to patient and patient verbalizes understanding             Discussed with patient the risks of sedation, respiratory depression, impairment of ability to drive and potential for abuse and addiction related to treatment with benzodiazepine medications  The patient understands risk of treatment with benzodiazepine medications, agrees to not drive if feels impaired and agrees to take medications as prescribed  The patient has been filling controlled prescriptions on time as prescribed to [facility] program       She reports normal appetite, decreased energy, no weight change and increase in number of sleep hours 8  Barney Church states she continues to feel depressed and anxious  Still has low energy and low motivation, feels hopeless at times, cannot concentrate  Reports racing thoughts "all day"  Stressed out with financial problems and issues in her marriage  No suicidal or homicidal ideation  Still has occasional passive death wish on and off, denies today  No psychotic symptoms  Still has hypersomnia  Appetite is adequate  No side effects from medications reported  Tolerates Wellbutrin XL  PHQ-9 remains at 23 today (same as at the last visit)  Vitals  Signs   Recorded: 88HQS6965 38:28OJ   Systolic: 156  Diastolic: 80  Heart Rate: 78  Height: 5 ft 3 in  Weight: 167 lb   BMI Calculated: 29 58  BSA Calculated: 1 79  BP Cuff Size: Large    Assessment    1  JOSE ROBERTO (generalized anxiety disorder) (300 02) (F41 1)   2  Major depressive disorder, recurrent severe without psychotic features (296 33) (F33 2)    Plan    1  Follow-up Visit in 4 Weeks Evaluation and Treatment  Follow-up  Status: Hold For -   Scheduling  Requested for: 25UCD2876    2  BuPROPion HCl ER (XL) 300 MG Oral Tablet Extended Release 24 Hour; TAKE 1   TABLET DAILY    Review of Systems    Constitutional: feeling poorly and feeling tired  Cardiovascular: no complaints of slow or fast heart rate, no chest pain, no palpitations  Respiratory: no complaints of shortness of breath, no wheezing, no dyspnea on exertion     Gastrointestinal: no complaints of abdominal pain, no constipation, no nausea, no diarrhea, no vomiting  Genitourinary: no complaints of dysuria, no incontinence, no pelvic pain, no urinary frequency  Musculoskeletal: shoulder pain and lower back pain  Integumentary: no complaints of skin rash, no itching, no dry skin  Neurological: no complaints of headache, no confusion, no numbness, no dizziness  Past Psychiatric History    Past Psychiatric History: No prior history of inpatient psychiatric treatment  Attended Partial Program in past one time  No history of past suicide attempts  Substance Abuse Hx    Substance Abuse History: Social alcohol use, no recreational drug use  Active Problems    1  Abnormal CBC (790 6) (R79 89)   2  Allergic rhinitis (477 9) (J30 9)   3  Asthma (493 90) (J45 909)   4  Asymptomatic menopausal state (V49 81) (Z78 0)   5  Breast cancer screening (V76 10) (Z12 39)   6  Bronchospasm (519 11) (J98 01)   7  Eczema (692 9) (L30 9)   8  Encounter for screening mammogram for malignant neoplasm of breast (V76 12)   (Z12 31)   9  Fatigue (780 79) (R53 83)   10  JOSE ROBERTO (generalized anxiety disorder) (300 02) (F41 1)   11  History of allergy (V15 09) (Z88 9)   12  Hyperlipidemia (272 4) (E78 5)   13  Laboratory examination ordered as part of a routine general medical examination    (V72 62) (Z00 00)   14  Major depressive disorder, recurrent severe without psychotic features (296 33) (F33 2)   15  Need for hepatitis B vaccination (V05 3) (Z23)   16  Need for prophylactic vaccination and inoculation against bacterial diseases (V03 9)    (Z23)   17  Need for prophylactic vaccination and inoculation against influenza (V04 81) (Z23)   18  Positive PPD (795 51) (R76 11)   19  Post-menopausal (V49 81) (Z78 0)   20  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)   21  Screening examination for pulmonary tuberculosis (V74 1) (Z11 1)   22   Vitamin D deficiency (268 9) (E55 9)    Past Medical History 1  History of A Fall (E888 9)   2  History of Acute upper respiratory infection (465 9) (J06 9)   3  History of Atelectasis (518 0) (J98 11)   4  History of Contusion Of Chest Wall With Intact Skin Surface (922 1)   5  History of acute bronchitis (V12 69) (Z87 09)   6  History of acute bronchitis (V12 69) (Z87 09)   7  History of fatigue (V13 89) (Z87 898)   8  Denied: History of head injury   9  History of low back pain (V13 59) (Z87 39)   10  History of low back pain (V13 59) (Z87 39)   11  History of shortness of breath (V13 89) (Z87 898)   12  History of Joint pain, knee (719 46) (M25 569)   13  Need for hepatitis B vaccination (V05 3) (Z23)   14  Need for prophylactic vaccination and inoculation against bacterial diseases (V03 9)    (Z23)   15  Need for prophylactic vaccination and inoculation against influenza (V04 81) (Z23)   16  History of Palpitations (785 1) (R00 2)   17  History of 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)   18  Denied: History of Seizures   19  History of Subconjunctival hemorrhage, unspecified laterality (372 72) (H11 30)    The active problems and past medical history were reviewed and updated today  Allergies    1  No Known Drug Allergies    Current Meds   1  Advair Diskus 250-50 MCG/DOSE Inhalation Aerosol Powder Breath Activated; 1 PUFF   TWICE DAILY; RINSE MOUTH AFTER USE AS NEEDED; Therapy: 86AQC5193 to (Evaluate:30Njl5100)  Requested for: 21Apr2015; Last   Rx:86Ouw6186 Ordered   2  ALPRAZolam 0 5 MG Oral Tablet; Take 1 tablet 3 times daily as needed; Therapy: 91ONG1985 to (Chilango Fears)  Requested for: 73Lpc3008; Last   Rx:17Rbh9303; Status: ACTIVE - Renewal Denied Ordered   3  BuPROPion HCl ER (SR) 100 MG Oral Tablet Extended Release 12 Hour; Take 1 tablet   daily; Therapy: 94Krr9056 to (Evaluate:06Nmb2215)  Requested for: 69Ift1102; Last   Rx:08Qpv1915 Ordered   4   BuPROPion HCl ER (XL) 150 MG Oral Tablet Extended Release 24 Hour; TAKE 1   TABLET DAILY; Therapy: 14DWO8682 to (Evaluate:05Jan2017)  Requested for: 67ANL9755; Last   Rx:24Hai5384 Ordered   5  Calcium 1250 MG TABS; TAKE 1 TABLET DAILY; Therapy: 58UVD6404 to Recorded   6  Fluticasone Propionate 50 MCG/ACT Nasal Suspension; USE 2 SPRAYS IN EACH   NOSTRIL ONCE DAILY; Therapy: 74OID1320 to (Dav Novak)  Requested for: 58WUE7144; Last   Rx:18Ixl9156 Ordered   7  Multi-Vitamins Oral Tablet; TAKE 1 TABLET DAILY; Therapy: 52SKB5790 to Recorded   8  PARoxetine HCl - 30 MG Oral Tablet; TAKE 1 TABLET IN THE EVENING; Therapy: 28IUZ8083 to (Kailyn Martinez)  Requested for: 15WER9994; Last   Rx:26Hij8377 Ordered   9  ProAir  (90 Base) MCG/ACT Inhalation Aerosol Solution; INHALE 2 PUFFS   EVERY 4 HOURS AS NEEDED FOR COUGH AND WHEEZE;   Therapy: 22GJN3493 to (Evaluate:28Dyo9617)  Requested for: 60Iih7077; Last   Rx:26Fzm5185 Ordered   10  Verapamil HCl  MG Oral Capsule Extended Release 24 Hour; TAKE 1 CAPSULE    Weekly; Therapy: 42Pxr9517 to (Evaluate:34Hyb1600) Recorded   11  Vitamin C 1000 MG Oral Tablet; TAKE 1 TABLET DAILY; Therapy: 47KCK1443 to Recorded   12  Vitamin D3 2000 UNIT Oral Capsule; TAKE 1 CAPSULE Daily; Therapy: 20RTK5629 to (Last Rx:55Hwm4204) Ordered    The medication list was reviewed and updated today  Family Psych History  Mother    1  No family history of mental disorder  Father    2  No family history of mental disorder  Grandmother    3  Family history of depression (V17 0) (Z81 8)  Family History    4  Family history of Hodgkin Disease   5  Family history of Hypothyroidism   6  Family history of Osteoporosis (V17 81)   7  Family history of Stroke Syndrome (V17 1)    The family history was reviewed and updated today         Social History    · Being A Social Drinker   · College graduate   · Marital History - Currently    · Never A Smoker   · No drug use   · Denied: History of Tobacco Use   · Travel / Residence In [place]   · Denied: History of Using Intravenous Drugs   · Work History  The social history was reviewed and updated today  The social history was reviewed and is unchanged  , lives with  and 15year old adoptive son who is autistic  Unemployed, worked in office management  Applied for disability  Has bachelor's degree in communications  History Of Phys/Sex Abuse Or Perpetration    History Of Phys/Sex Abuse or Perpetration: No history of physical or sexual abuse  End of Encounter Meds    1  Advair Diskus 250-50 MCG/DOSE Inhalation Aerosol Powder Breath Activated; 1 PUFF   TWICE DAILY; RINSE MOUTH AFTER USE AS NEEDED; Therapy: 84PJF8710 to (Evaluate:32Num9647)  Requested for: 21Apr2015; Last   Rx:21Apr2015 Ordered   2  Fluticasone Propionate 50 MCG/ACT Nasal Suspension; USE 2 SPRAYS IN EACH   NOSTRIL ONCE DAILY; Therapy: 69CAK2954 to (La Nena Knight)  Requested for: 76XLR2545; Last   Rx:12May2015 Ordered    3  ProAir  (90 Base) MCG/ACT Inhalation Aerosol Solution; INHALE 2 PUFFS   EVERY 4 HOURS AS NEEDED FOR COUGH AND WHEEZE;   Therapy: 97ZLW1850 to (Evaluate:11Ndo1873)  Requested for: 21Apr2015; Last   Rx:21Apr2015 Ordered    4  ALPRAZolam 0 5 MG Oral Tablet (Xanax); Take 1 tablet 3 times daily as needed; Therapy: 86SQT6625 to (Sammy Heredia)  Requested for: 12Sep2016; Last   Rx:07Sep2016; Status: ACTIVE - Renewal Denied Ordered    5  PARoxetine HCl - 30 MG Oral Tablet; TAKE 1 TABLET IN THE EVENING; Therapy: 33TLA0776 to (Sammy Heredia)  Requested for: 03OYV1238; Last   Rx:07Sep2016 Ordered    6  Calcium 1250 MG TABS; TAKE 1 TABLET DAILY; Therapy: 03MYN8964 to Recorded   7  Multi-Vitamins Oral Tablet; TAKE 1 TABLET DAILY; Therapy: 60FHF9764 to Recorded   8  Vitamin C 1000 MG Oral Tablet; TAKE 1 TABLET DAILY; Therapy: 86RJO3729 to Recorded    9  BuPROPion HCl ER (XL) 300 MG Oral Tablet Extended Release 24 Hour; TAKE 1   TABLET DAILY;    Therapy: 66DZS4817 to (Evaluate:04Jan2017)  Requested for: 19VCL2556; Last   Rx:56Wgq8467 Ordered    10  Verapamil HCl  MG Oral Capsule Extended Release 24 Hour; TAKE 1 CAPSULE    Weekly; Therapy: 36Unp6825 to (Evaluate:23Jun2015) Recorded    11  BuPROPion HCl ER (SR) 100 MG Oral Tablet Extended Release 12 Hour; Take 1 tablet    daily; Therapy: 15Ziw3221 to (Evaluate:73Dar2513)  Requested for: 06Lro8218; Last    Rx:73Uff8972 Ordered    12  Vitamin D3 2000 UNIT Oral Capsule; TAKE 1 CAPSULE Daily;     Therapy: 15ZKQ1271 to (Last Rx:82Enc2621) Ordered    Future Appointments    Date/Time Provider Specialty Site   10/20/2016 11:15 AM Graeme Dejesus MS, APRN, PMHCNS_BS  [facility]   11/04/2016 11:15 AM Graeme Dejesus MS, APRN, PMHCNS_BS  [facility]   11/17/2016 10:15 AM Graeme Dejesus MS, APRN, PMHCNS_BS  [facility]   12/09/2016 11:15 AM Graeme Dejesus MS, APRN, PMHCNS_BS  [facility]     Signatures   Electronically signed by : SHANTE Wills ; Oct  6 2016  2:56PM EST                       (Author)

## 2018-01-19 PROBLEM — Z78.0 POSTMENOPAUSAL STATUS: Status: ACTIVE | Noted: 2018-01-19

## 2018-01-19 PROBLEM — J30.9 ATOPIC RHINITIS: Status: ACTIVE | Noted: 2018-01-19

## 2018-01-19 PROBLEM — E78.5 HYPERLIPIDEMIA: Status: ACTIVE | Noted: 2018-01-19

## 2018-01-19 PROBLEM — J45.909 ASTHMA: Status: ACTIVE | Noted: 2018-01-19

## 2018-01-19 PROBLEM — I05.9 MITRAL VALVE DISORDER: Status: ACTIVE | Noted: 2018-01-19

## 2018-01-19 PROBLEM — Z87.898 HISTORY OF DISEASE: Status: ACTIVE | Noted: 2018-01-19

## 2018-01-19 PROBLEM — L30.9 ECZEMA: Status: ACTIVE | Noted: 2018-01-19

## 2018-01-19 PROBLEM — E55.9 VITAMIN D DEFICIENCY: Status: ACTIVE | Noted: 2018-01-19

## 2018-01-22 VITALS
HEART RATE: 73 BPM | DIASTOLIC BLOOD PRESSURE: 82 MMHG | BODY MASS INDEX: 30.3 KG/M2 | HEIGHT: 63 IN | WEIGHT: 171 LBS | SYSTOLIC BLOOD PRESSURE: 119 MMHG

## 2018-01-22 VITALS
BODY MASS INDEX: 29.41 KG/M2 | DIASTOLIC BLOOD PRESSURE: 78 MMHG | HEART RATE: 71 BPM | SYSTOLIC BLOOD PRESSURE: 117 MMHG | WEIGHT: 166 LBS | HEIGHT: 63 IN

## 2018-01-25 ENCOUNTER — DOCUMENTATION (OUTPATIENT)
Dept: PSYCHIATRY | Facility: CLINIC | Age: 58
End: 2018-01-25

## 2018-01-25 NOTE — PROGRESS NOTES
Treatment Plan Tracking    # 1Treatment Plan not completed within required time limits due to: Client cancelled/no-showed scheduled appointment  Aurea Rivers N/S  appt    1/24/18

## 2018-03-07 NOTE — PSYCH
History of Present Illness    Presenting Problems: Stressors: PATIENT HAVING INCREASE DEPRESSION AND HOPELESSNESS PATIENT CALLED DR Rola Densie YESTERDAY AND STATED SHE WANTED TO TAKE EXTRA PILLS DR CALLED CRISIS AND PT WAS SEEN BY CRISIS WORKER FOR 1 HOUR DID NOT TAKE EXTRA MEDS AND CONTRACTED FOR [employer] PATIENT IS WORRIED ABOUT HUSBANDS EMPLOYMENT BECAUSE HE IS THE SOLE PROVIDER AND WENT ON STRIKE WITH [employer] PT HAS INCREASE FINANCIAL STRESS PT APPLYING FOR SSD AND WAS DENIED ALSO RAISING AN ADOPT SON WITH AUTISM  Referral Source: [facility] AND DR Rola Denise  She is not employed  Symptoms: suicidal ideation, no self abusive behaviors, no homicidal thoughts, no history of violence toward others, depressed mood, anxiety, no psychosis, no medication noncompliance, sleep disturbances, no change in appetite, no agitation, no hypomania and POOR CONCENTRATION LOW ENERGY LOW MOTIVATION   Provisional Diagnosis: Axis I: MDD RECURRENT SERVERE  Substance Abuse: No substance abuse  Psychiatric Treatment History: Current Psychiatrist: DR Rola Denise and Gayathri Grimaldo   The patient does not require ambulatory assistance  Legal Issues: The patient does not have legal issues  Action: Record received and Laboratory work received  ACCEPTED  Appointment Date: 04/15/2016        Signatures   Electronically signed by : Tila Lino, ; Apr 14 2016  2:34PM EST                       (Author)    Electronically signed by : Tila Lino, ; Apr 14 2016  2:42PM EST                       (Author)

## 2018-03-07 NOTE — PSYCH
Message  Patient No Show Letter - Behavioral Health:     Date: 10/25/2016     Dear Prerna Sanchez,     We missed seeing you for a scheduled appointment at [facility] on 10-20-16 at 11:15am with Johnson Eddy  Our goal is to offer the best possible care to our patients, so we are concerned when you are unable to keep a scheduled appointment  Please call us at [phone] so that we can reschedule the appointment for a date and time that will work for you  We understand that circumstances may arise which make it impossible for you to keep a scheduled appointment  Should this happen in the future, please call us as soon as you know the appointment will be missed  The earlier you let us know, the more likely we can offer your scheduled appointment time to another patient  We hope to hear from you soon       Sincerely,   Johnson Eddy      Signatures   Electronically signed by : Johnson Eddy [id number]; Oct 25 2016  9:54AM EST                       (Author)

## 2018-05-02 ENCOUNTER — APPOINTMENT (EMERGENCY)
Dept: CT IMAGING | Facility: HOSPITAL | Age: 58
End: 2018-05-02
Payer: COMMERCIAL

## 2018-05-02 ENCOUNTER — HOSPITAL ENCOUNTER (EMERGENCY)
Facility: HOSPITAL | Age: 58
Discharge: HOME | End: 2018-05-02
Attending: EMERGENCY MEDICINE | Admitting: EMERGENCY MEDICINE
Payer: COMMERCIAL

## 2018-05-02 VITALS
RESPIRATION RATE: 18 BRPM | DIASTOLIC BLOOD PRESSURE: 100 MMHG | HEIGHT: 63 IN | TEMPERATURE: 98.2 F | SYSTOLIC BLOOD PRESSURE: 169 MMHG | HEART RATE: 70 BPM | OXYGEN SATURATION: 99 % | BODY MASS INDEX: 32.03 KG/M2 | WEIGHT: 180.78 LBS

## 2018-05-02 DIAGNOSIS — M54.50 ACUTE RIGHT-SIDED LOW BACK PAIN WITHOUT SCIATICA: Primary | ICD-10-CM

## 2018-05-02 LAB
ALBUMIN SERPL BCP-MCNC: 4.2 G/DL (ref 3.5–5)
ALP SERPL-CCNC: 76 U/L (ref 46–116)
ALT SERPL W P-5'-P-CCNC: 26 U/L (ref 12–78)
ANION GAP SERPL CALCULATED.3IONS-SCNC: 6 MMOL/L (ref 4–13)
AST SERPL W P-5'-P-CCNC: 16 U/L (ref 5–45)
BACTERIA UR QL AUTO: ABNORMAL /HPF
BASOPHILS # BLD AUTO: 0.02 THOUSANDS/ΜL (ref 0–0.1)
BASOPHILS NFR BLD AUTO: 0 % (ref 0–1)
BILIRUB SERPL-MCNC: 0.4 MG/DL (ref 0.2–1)
BILIRUB UR QL STRIP: NEGATIVE
BUN SERPL-MCNC: 13 MG/DL (ref 5–25)
CALCIUM SERPL-MCNC: 9.4 MG/DL (ref 8.3–10.1)
CHLORIDE SERPL-SCNC: 104 MMOL/L (ref 100–108)
CLARITY UR: CLEAR
CO2 SERPL-SCNC: 29 MMOL/L (ref 21–32)
COLOR UR: YELLOW
CREAT SERPL-MCNC: 0.84 MG/DL (ref 0.6–1.3)
EOSINOPHIL # BLD AUTO: 0.1 THOUSAND/ΜL (ref 0–0.61)
EOSINOPHIL NFR BLD AUTO: 2 % (ref 0–6)
ERYTHROCYTE [DISTWIDTH] IN BLOOD BY AUTOMATED COUNT: 14.5 % (ref 11.6–15.1)
GFR SERPL CREATININE-BSD FRML MDRD: 89 ML/MIN/1.73SQ M
GLUCOSE SERPL-MCNC: 88 MG/DL (ref 65–140)
GLUCOSE UR STRIP-MCNC: NEGATIVE MG/DL
HCT VFR BLD AUTO: 41.2 % (ref 34.8–46.1)
HGB BLD-MCNC: 13.6 G/DL (ref 11.5–15.4)
HGB UR QL STRIP.AUTO: ABNORMAL
KETONES UR STRIP-MCNC: NEGATIVE MG/DL
LEUKOCYTE ESTERASE UR QL STRIP: NEGATIVE
LIPASE SERPL-CCNC: 111 U/L (ref 73–393)
LYMPHOCYTES # BLD AUTO: 1.58 THOUSANDS/ΜL (ref 0.6–4.47)
LYMPHOCYTES NFR BLD AUTO: 26 % (ref 14–44)
MCH RBC QN AUTO: 26.5 PG (ref 26.8–34.3)
MCHC RBC AUTO-ENTMCNC: 33 G/DL (ref 31.4–37.4)
MCV RBC AUTO: 80 FL (ref 82–98)
MONOCYTES # BLD AUTO: 0.36 THOUSAND/ΜL (ref 0.17–1.22)
MONOCYTES NFR BLD AUTO: 6 % (ref 4–12)
NEUTROPHILS # BLD AUTO: 3.93 THOUSANDS/ΜL (ref 1.85–7.62)
NEUTS SEG NFR BLD AUTO: 66 % (ref 43–75)
NITRITE UR QL STRIP: NEGATIVE
NON-SQ EPI CELLS URNS QL MICRO: ABNORMAL /HPF
PH UR STRIP.AUTO: 5.5 [PH] (ref 4.5–8)
PLATELET # BLD AUTO: 276 THOUSANDS/UL (ref 149–390)
PMV BLD AUTO: 9.7 FL (ref 8.9–12.7)
POTASSIUM SERPL-SCNC: 4.3 MMOL/L (ref 3.5–5.3)
PROT SERPL-MCNC: 7.8 G/DL (ref 6.4–8.2)
PROT UR STRIP-MCNC: NEGATIVE MG/DL
RBC # BLD AUTO: 5.13 MILLION/UL (ref 3.81–5.12)
RBC #/AREA URNS AUTO: ABNORMAL /HPF
SODIUM SERPL-SCNC: 139 MMOL/L (ref 136–145)
SP GR UR STRIP.AUTO: <=1.005 (ref 1–1.03)
UROBILINOGEN UR QL STRIP.AUTO: 0.2 E.U./DL
WBC # BLD AUTO: 5.99 THOUSAND/UL (ref 4.31–10.16)
WBC #/AREA URNS AUTO: ABNORMAL /HPF

## 2018-05-02 PROCEDURE — 96374 THER/PROPH/DIAG INJ IV PUSH: CPT

## 2018-05-02 PROCEDURE — 83690 ASSAY OF LIPASE: CPT | Performed by: PHYSICIAN ASSISTANT

## 2018-05-02 PROCEDURE — 74177 CT ABD & PELVIS W/CONTRAST: CPT

## 2018-05-02 PROCEDURE — 80053 COMPREHEN METABOLIC PANEL: CPT | Performed by: PHYSICIAN ASSISTANT

## 2018-05-02 PROCEDURE — 81001 URINALYSIS AUTO W/SCOPE: CPT

## 2018-05-02 PROCEDURE — 96361 HYDRATE IV INFUSION ADD-ON: CPT

## 2018-05-02 PROCEDURE — 85025 COMPLETE CBC W/AUTO DIFF WBC: CPT | Performed by: PHYSICIAN ASSISTANT

## 2018-05-02 PROCEDURE — 36415 COLL VENOUS BLD VENIPUNCTURE: CPT | Performed by: PHYSICIAN ASSISTANT

## 2018-05-02 PROCEDURE — 99284 EMERGENCY DEPT VISIT MOD MDM: CPT

## 2018-05-02 RX ORDER — PREDNISONE 20 MG/1
60 TABLET ORAL ONCE
Status: COMPLETED | OUTPATIENT
Start: 2018-05-02 | End: 2018-05-02

## 2018-05-02 RX ORDER — CYCLOBENZAPRINE HCL 5 MG
TABLET ORAL
Qty: 12 TABLET | Refills: 0 | Status: SHIPPED | OUTPATIENT
Start: 2018-05-02 | End: 2019-03-06 | Stop reason: ALTCHOICE

## 2018-05-02 RX ORDER — MORPHINE SULFATE 4 MG/ML
4 INJECTION, SOLUTION INTRAMUSCULAR; INTRAVENOUS ONCE
Status: COMPLETED | OUTPATIENT
Start: 2018-05-02 | End: 2018-05-02

## 2018-05-02 RX ORDER — PREDNISONE 20 MG/1
40 TABLET ORAL DAILY
Qty: 8 TABLET | Refills: 0 | Status: SHIPPED | OUTPATIENT
Start: 2018-05-02 | End: 2018-05-06

## 2018-05-02 RX ADMIN — PREDNISONE 60 MG: 20 TABLET ORAL at 13:36

## 2018-05-02 RX ADMIN — MORPHINE SULFATE 4 MG: 4 INJECTION, SOLUTION INTRAMUSCULAR; INTRAVENOUS at 11:39

## 2018-05-02 RX ADMIN — SODIUM CHLORIDE 1000 ML: 0.9 INJECTION, SOLUTION INTRAVENOUS at 11:40

## 2018-05-02 RX ADMIN — IOHEXOL 100 ML: 350 INJECTION, SOLUTION INTRAVENOUS at 12:25

## 2018-05-02 NOTE — DISCHARGE INSTRUCTIONS
Acute Low Back Pain   WHAT YOU NEED TO KNOW:   Acute low back pain is sudden discomfort in your lower back area that lasts for up to 6 weeks  The discomfort makes it difficult to tolerate activity  DISCHARGE INSTRUCTIONS:   Return to the emergency department if:   · You have severe pain  · You have sudden stiffness and heaviness on both buttocks down to both legs  · You have numbness or weakness in one leg, or pain in both legs  · You have numbness in your genital area or across your lower back  · You cannot control your urine or bowel movements  Contact your healthcare provider if:   · You have a fever  · You have pain at night or when you rest     · Your pain does not get better with treatment  · You have pain that worsens when you cough or sneeze  · You suddenly feel something pop or snap in your back  · You have questions or concerns about your condition or care  Medicines: The following medicines may be ordered by your healthcare provider:  · Acetaminophen  decreases pain  It is available without a doctor's order  Ask how much to take and how often to take it  Follow directions  Acetaminophen can cause liver damage if not taken correctly  · NSAIDs  help decrease swelling and pain  This medicine is available with or without a doctor's order  NSAIDs can cause stomach bleeding or kidney problems in certain people  If you take blood thinner medicine, always ask your healthcare provider if NSAIDs are safe for you  Always read the medicine label and follow directions  · Prescription pain medicine  may be given  Ask your healthcare provider how to take this medicine safely  · Muscle relaxers  decrease pain by relaxing the muscles in your lower spine  · Take your medicine as directed  Contact your healthcare provider if you think your medicine is not helping or if you have side effects  Tell him of her if you are allergic to any medicine   Keep a list of the medicines, vitamins, and herbs you take  Include the amounts, and when and why you take them  Bring the list or the pill bottles to follow-up visits  Carry your medicine list with you in case of an emergency  Self-care:   · Stay active  as much as you can without causing more pain  Bed rest could make your back pain worse  Start with some light exercises such as walking  Avoid heavy lifting until your pain is gone  Ask for more information about the activities or exercises that are right for you  · Ice  helps decrease swelling, pain, and muscle spams  Put crushed ice in a plastic bag  Cover it with a towel  Place it on your lower back for 20 to 30 minutes every 2 hours  Do this for about 2 to 3 days after your pain starts, or as directed  · Heat  helps decrease pain and muscle spasms  Start to use heat after treatment with ice has stopped  Use a small towel dampened with warm water or a heating pad, or sit in a warm bath  Apply heat on the area for 20 to 30 minutes every 2 hours for as many days as directed  Alternate heat and ice  Prevent acute low back pain:   · Use proper body mechanics  ¨ Bend at the hips and knees when you  objects  Do not bend from the waist  Use your leg muscles as you lift the load  Do not use your back  Keep the object close to your chest as you lift it  Try not to twist or lift anything above your waist     ¨ Change your position often when you stand for long periods of time  Rest one foot on a small box or footrest, and then switch to the other foot often  ¨ Try not to sit for long periods of time  When you do, sit in a straight-backed chair with your feet flat on the floor  Never reach, pull, or push while you are sitting  · Do exercises that strengthen your back muscles  Warm up before you exercise  Ask your healthcare provider the best exercises for you  · Maintain a healthy weight  Ask your healthcare provider how much you should weigh   Ask him to help you create a weight loss plan if you are overweight  Follow up with your healthcare provider as directed:  Return for a follow-up visit if you still have pain after 1 to 3 weeks of treatment  You may need to visit an orthopedist if your back pain lasts more than 12 weeks  Write down your questions so you remember to ask them during your visits  © 2017 [employer]  Information is for End User's use only and may not be sold, redistributed or otherwise used for commercial purposes  All illustrations and images included in CareNotes® are the copyrighted property of [employer]  or Randy Felipe  The above information is an  only  It is not intended as medical advice for individual conditions or treatments  Talk to your doctor, nurse or pharmacist before following any medical regimen to see if it is safe and effective for you

## 2018-05-02 NOTE — ED PROVIDER NOTES
History  Chief Complaint   Patient presents with    Flank Pain     PT reports "right lower back pain that travels to the front and into my groin, it wont stop, everytime I move or reach for something it get worse"  reports "we took a hiking class and it seems to have started after that" Pt was seen at [facility] last Wednesday and recd RX = Methocarbamol and Naproxen PT reports "not working"      45-year-old female presents to the emergency department with complaints of right-sided back and flank pain  States she has had symptoms over the past week  Reports the pain radiates around to the right of the abdomen and groin  States that pain seems to be worse when she moves or reaches for something  Seen at urgent care previously and was given a prescription for methocarbamol and naproxen  States she is taking these medications without relief of symptoms          History provided by:  Patient   used: No    Flank Pain   Pain location:  R flank  Pain quality: stabbing    Pain radiates to:  RLQ  Pain severity:  Moderate  Onset quality:  Gradual  Duration:  1 week  Timing:  Constant  Progression:  Waxing and waning  Chronicity:  New  Context: not alcohol use, not awakening from sleep, not diet changes, not eating, not laxative use, not medication withdrawal, not previous surgeries, not recent illness, not recent sexual activity, not recent travel, not retching, not sick contacts, not suspicious food intake and not trauma    Relieved by:  Nothing  Ineffective treatments:  NSAIDs (Muscle relaxer)  Associated symptoms: no anorexia, no belching, no chest pain, no chills, no constipation, no cough, no diarrhea, no dysuria, no fatigue, no fever, no flatus, no hematemesis, no hematochezia, no hematuria, no melena, no nausea, no shortness of breath, no sore throat, no vaginal bleeding, no vaginal discharge and no vomiting        None       Past Medical History:   Diagnosis Date    Asthma     Atopic rhinitis     Eczema     Hyperlipidemia     MVP (mitral valve prolapse)     Vitamin D deficiency        History reviewed  No pertinent surgical history  Family History   Problem Relation Age of Onset    Depression Other      I have reviewed and agree with the history as documented  Social History   Substance Use Topics    Smoking status: Never Smoker    Smokeless tobacco: Never Used    Alcohol use Yes      Comment: Social alcohol use        Review of Systems   Constitutional: Negative for activity change, appetite change, chills, fatigue and fever  HENT: Negative for congestion, dental problem, drooling, ear discharge, ear pain, mouth sores, nosebleeds, rhinorrhea, sore throat and trouble swallowing  Eyes: Negative for pain, discharge and itching  Respiratory: Negative for cough, chest tightness, shortness of breath and wheezing  Cardiovascular: Negative for chest pain and palpitations  Gastrointestinal: Negative for abdominal pain, anorexia, blood in stool, constipation, diarrhea, flatus, hematemesis, hematochezia, melena, nausea and vomiting  Endocrine: Negative for cold intolerance and heat intolerance  Genitourinary: Positive for flank pain  Negative for difficulty urinating, dysuria, frequency, hematuria, urgency, vaginal bleeding and vaginal discharge  Skin: Negative for rash and wound  Allergic/Immunologic: Negative for food allergies and immunocompromised state  Neurological: Negative for dizziness, seizures, syncope, weakness, numbness and headaches  Psychiatric/Behavioral: Negative for agitation, behavioral problems and confusion         Physical Exam  ED Triage Vitals   Temperature Pulse Respirations Blood Pressure SpO2   05/02/18 1101 05/02/18 1100 05/02/18 1101 05/02/18 1100 05/02/18 1100   98 2 °F (36 8 °C) 72 18 169/100 98 %      Temp Source Heart Rate Source Patient Position - Orthostatic VS BP Location FiO2 (%)   05/02/18 1101 05/02/18 1101 05/02/18 1101 05/02/18 1101 --   Oral Monitor Lying Right arm       Pain Score       05/02/18 1139       9           Orthostatic Vital Signs  Vitals:    05/02/18 1100 05/02/18 1101   BP: 169/100 169/100   Pulse: 72 70   Patient Position - Orthostatic VS:  Lying       Physical Exam   Constitutional: She is oriented to person, place, and time  She appears well-developed and well-nourished  No distress  HENT:   Head: Normocephalic and atraumatic  Right Ear: External ear normal    Left Ear: External ear normal    Mouth/Throat: Oropharynx is clear and moist  No oropharyngeal exudate  Eyes: Conjunctivae are normal    Neck: No JVD present  No tracheal deviation present  Cardiovascular: Normal rate, regular rhythm and normal heart sounds  Exam reveals no gallop and no friction rub  No murmur heard  Pulmonary/Chest: Effort normal and breath sounds normal  No respiratory distress  She has no wheezes  She has no rales  She exhibits no tenderness  Abdominal: Soft  Bowel sounds are normal  She exhibits no distension  There is tenderness in the right lower quadrant  There is CVA tenderness (Right-sided)  There is no guarding  Musculoskeletal: Normal range of motion  She exhibits no edema, tenderness or deformity  Back:    Lymphadenopathy:     She has no cervical adenopathy  Neurological: She is alert and oriented to person, place, and time  Skin: Skin is warm and dry  No rash noted  She is not diaphoretic  No erythema  Psychiatric: She has a normal mood and affect  Her behavior is normal    Nursing note and vitals reviewed        ED Medications  Medications   sodium chloride 0 9 % bolus 1,000 mL (0 mL Intravenous Stopped 5/2/18 1344)   morphine (PF) 4 mg/mL injection 4 mg (4 mg Intravenous Given 5/2/18 1139)   iohexol (OMNIPAQUE) 350 MG/ML injection (MULTI-DOSE) 100 mL (100 mL Intravenous Given 5/2/18 1225)   predniSONE tablet 60 mg (60 mg Oral Given 5/2/18 1336)       Diagnostic Studies  Results Reviewed Procedure Component Value Units Date/Time    Urine Microscopic [[id number]]  (Abnormal) Collected:  05/02/18 1343    Lab Status:  Final result Specimen:  Urine Updated:  05/02/18 1401     RBC, UA 0-1 (A) /hpf      WBC, UA 0-1 (A) /hpf      Epithelial Cells None Seen /hpf      Bacteria, UA None Seen /hpf     ED Urine Macroscopic [[id number]]  (Abnormal) Collected:  05/02/18 1343    Lab Status:  Final result Specimen:  Urine Updated:  05/02/18 1341     Color, UA Yellow     Clarity, UA Clear     pH, UA 5 5     Leukocytes, UA Negative     Nitrite, UA Negative     Protein, UA Negative mg/dl      Glucose, UA Negative mg/dl      Ketones, UA Negative mg/dl      Urobilinogen, UA 0 2 E U /dl      Bilirubin, UA Negative     Blood, UA Trace (A)     Specific [place], UA <=1 005    Narrative:       CLINITEK RESULT    Lipase [[id number]]  (Normal) Collected:  05/02/18 1138    Lab Status:  Final result Specimen:  Blood from Arm, Right Updated:  05/02/18 1209     Lipase 111 u/L     Comprehensive metabolic panel [[id number]] Collected:  05/02/18 1138    Lab Status:  Final result Specimen:  Blood from Arm, Right Updated:  05/02/18 1209     Sodium 139 mmol/L      Potassium 4 3 mmol/L      Chloride 104 mmol/L      CO2 29 mmol/L      Anion Gap 6 mmol/L      BUN 13 mg/dL      Creatinine 0 84 mg/dL      Glucose 88 mg/dL      Calcium 9 4 mg/dL      AST 16 U/L      ALT 26 U/L      Alkaline Phosphatase 76 U/L      Total Protein 7 8 g/dL      Albumin 4 2 g/dL      Total Bilirubin 0 40 mg/dL      eGFR 89 ml/min/1 73sq m     Narrative:         National Kidney Disease Education Program recommendations are as follows:  GFR calculation is accurate only with a steady state creatinine  Chronic Kidney disease less than 60 ml/min/1 73 sq  meters  Kidney failure less than 15 ml/min/1 73 sq  meters      CBC and differential [[id number]]  (Abnormal) Collected:  05/02/18 1138    Lab Status:  Final result Specimen:  Blood from Arm, Right Updated:  05/02/18 1154 WBC 5 99 Thousand/uL      RBC 5 13 (H) Million/uL      Hemoglobin 13 6 g/dL      Hematocrit 41 2 %      MCV 80 (L) fL      MCH 26 5 (L) pg      MCHC 33 0 g/dL      RDW 14 5 %      MPV 9 7 fL      Platelets 388 Thousands/uL      Neutrophils Relative 66 %      Lymphocytes Relative 26 %      Monocytes Relative 6 %      Eosinophils Relative 2 %      Basophils Relative 0 %      Neutrophils Absolute 3 93 Thousands/µL      Lymphocytes Absolute 1 58 Thousands/µL      Monocytes Absolute 0 36 Thousand/µL      Eosinophils Absolute 0 10 Thousand/µL      Basophils Absolute 0 02 Thousands/µL                  CT abdomen pelvis with contrast   Final Result by Junior Gómez MD (05/02 1239)      No acute pathology  Hepatic cysts  Small uterine fibroid  Workstation performed: [id number]                    Procedures  Procedures       Phone Contacts  ED Phone Contact    ED Course                               MDM  Number of Diagnoses or Management Options  Acute right-sided low back pain without sciatica:   Diagnosis management comments:  Differential diagnosis includes but not limited to:   Kidney stone, pyelonephritis, appendicitis, lumbar strain, disc herniation, radicular pain       Amount and/or Complexity of Data Reviewed  Clinical lab tests: ordered and reviewed  Tests in the radiology section of CPT®: ordered and reviewed      CritCare Time    Disposition  Final diagnoses:   Acute right-sided low back pain without sciatica     Time reflects when diagnosis was documented in both MDM as applicable and the Disposition within this note     Time User Action Codes Description Comment    5/2/2018  1:22 PM Nelson Arevalo 26 [M54 5] Acute right-sided low back pain without sciatica       ED Disposition     ED Disposition Condition Comment    Discharge  Joaquin Rainey discharge to home/self care      Condition at discharge: Stable        Follow-up Information     Follow up With Specialties Details Why Contact [address] [facility], DO Internal Medicine Schedule an appointment as soon as possible for a visit  [phone] [facility]  [address] [facility] [id number] [phone]          Discharge Medication List as of 5/2/2018  1:23 PM      START taking these medications    Details   cyclobenzaprine (FLEXERIL) 5 mg tablet 1-2 PO TID PRN, Normal      predniSONE 20 mg tablet Take 2 tablets (40 mg total) by mouth daily for 4 days, Starting Wed 5/2/2018, Until Sun 5/6/2018, Normal           No discharge procedures on file      ED Provider  Electronically Signed by           Sammy Varma PA-C  05/02/18 8904

## 2018-05-17 DIAGNOSIS — F41.1 GAD (GENERALIZED ANXIETY DISORDER): Chronic | ICD-10-CM

## 2018-05-17 DIAGNOSIS — F33.2 MAJOR DEPRESSIVE DISORDER, RECURRENT SEVERE WITHOUT PSYCHOTIC FEATURES (HCC): Primary | Chronic | ICD-10-CM

## 2018-05-17 RX ORDER — PAROXETINE HYDROCHLORIDE 40 MG/1
40 TABLET, FILM COATED ORAL EVERY EVENING
Qty: 90 TABLET | Refills: 0 | Status: SHIPPED | OUTPATIENT
Start: 2018-05-17 | End: 2018-08-19 | Stop reason: SDUPTHER

## 2018-05-26 DIAGNOSIS — F41.1 GAD (GENERALIZED ANXIETY DISORDER): Primary | Chronic | ICD-10-CM

## 2018-05-29 PROBLEM — M85.80 OSTEOPENIA: Status: ACTIVE | Noted: 2018-05-29

## 2018-05-29 PROBLEM — I34.1 MVP (MITRAL VALVE PROLAPSE): Status: ACTIVE | Noted: 2018-01-19

## 2018-05-29 PROBLEM — I47.10 PAROXYSMAL SVT (SUPRAVENTRICULAR TACHYCARDIA): Status: ACTIVE | Noted: 2018-05-29

## 2018-05-29 PROBLEM — Z88.9 HX OF SEASONAL ALLERGIES: Status: ACTIVE | Noted: 2018-05-29

## 2018-05-29 PROBLEM — E78.5 DYSLIPIDEMIA: Status: ACTIVE | Noted: 2017-05-18

## 2018-05-29 PROBLEM — I47.1 PAROXYSMAL SVT (SUPRAVENTRICULAR TACHYCARDIA) (HCC): Status: ACTIVE | Noted: 2018-05-29

## 2018-05-29 RX ORDER — ALPRAZOLAM 0.5 MG/1
0.5 TABLET ORAL 3 TIMES DAILY PRN
Qty: 90 TABLET | Refills: 2 | Status: SHIPPED | OUTPATIENT
Start: 2018-05-29 | End: 2018-11-05 | Stop reason: SDUPTHER

## 2018-08-19 DIAGNOSIS — F41.1 GAD (GENERALIZED ANXIETY DISORDER): Chronic | ICD-10-CM

## 2018-08-19 DIAGNOSIS — F33.2 MAJOR DEPRESSIVE DISORDER, RECURRENT SEVERE WITHOUT PSYCHOTIC FEATURES (HCC): Primary | Chronic | ICD-10-CM

## 2018-08-20 RX ORDER — PAROXETINE HYDROCHLORIDE 40 MG/1
40 TABLET, FILM COATED ORAL EVERY EVENING
Qty: 90 TABLET | Refills: 0 | Status: SHIPPED | OUTPATIENT
Start: 2018-08-20 | End: 2018-11-05 | Stop reason: SDUPTHER

## 2018-11-05 ENCOUNTER — OFFICE VISIT (OUTPATIENT)
Dept: PSYCHIATRY | Facility: CLINIC | Age: 58
End: 2018-11-05
Payer: COMMERCIAL

## 2018-11-05 VITALS
DIASTOLIC BLOOD PRESSURE: 81 MMHG | WEIGHT: 166 LBS | HEART RATE: 82 BPM | BODY MASS INDEX: 29.41 KG/M2 | SYSTOLIC BLOOD PRESSURE: 124 MMHG | HEIGHT: 63 IN

## 2018-11-05 DIAGNOSIS — F41.1 GAD (GENERALIZED ANXIETY DISORDER): Chronic | ICD-10-CM

## 2018-11-05 DIAGNOSIS — F33.2 MAJOR DEPRESSIVE DISORDER, RECURRENT SEVERE WITHOUT PSYCHOTIC FEATURES (HCC): Primary | Chronic | ICD-10-CM

## 2018-11-05 PROBLEM — E78.00 PURE HYPERCHOLESTEROLEMIA: Status: ACTIVE | Noted: 2018-05-18

## 2018-11-05 PROCEDURE — 90833 PSYTX W PT W E/M 30 MIN: CPT | Performed by: PSYCHIATRY & NEUROLOGY

## 2018-11-05 PROCEDURE — 99213 OFFICE O/P EST LOW 20 MIN: CPT | Performed by: PSYCHIATRY & NEUROLOGY

## 2018-11-05 RX ORDER — MONTELUKAST SODIUM 10 MG/1
10 TABLET ORAL
COMMUNITY
Start: 2018-03-19 | End: 2020-09-08 | Stop reason: ALTCHOICE

## 2018-11-05 RX ORDER — ALPRAZOLAM 0.5 MG/1
0.5 TABLET ORAL 3 TIMES DAILY PRN
Qty: 90 TABLET | Refills: 3 | Status: SHIPPED | OUTPATIENT
Start: 2018-11-05 | End: 2019-03-06 | Stop reason: SDUPTHER

## 2018-11-05 RX ORDER — CHOLECALCIFEROL (VITAMIN D3) 125 MCG
1 CAPSULE ORAL DAILY
COMMUNITY
Start: 2013-10-25

## 2018-11-05 RX ORDER — PAROXETINE HYDROCHLORIDE 40 MG/1
40 TABLET, FILM COATED ORAL EVERY EVENING
Qty: 90 TABLET | Refills: 1 | Status: SHIPPED | OUTPATIENT
Start: 2018-11-05 | End: 2019-03-06 | Stop reason: SDUPTHER

## 2018-11-05 RX ORDER — NAPROXEN 500 MG/1
500 TABLET ORAL 2 TIMES DAILY PRN
COMMUNITY
Start: 2018-05-17 | End: 2019-03-06 | Stop reason: ALTCHOICE

## 2018-11-05 RX ORDER — INDOMETHACIN 50 MG/1
50 CAPSULE ORAL
Refills: 0 | COMMUNITY
Start: 2018-09-12 | End: 2019-03-06 | Stop reason: ALTCHOICE

## 2018-11-05 RX ORDER — ARIPIPRAZOLE 2 MG/1
2 TABLET ORAL EVERY EVENING
Qty: 30 TABLET | Refills: 3 | Status: SHIPPED | OUTPATIENT
Start: 2018-11-05 | End: 2019-03-06 | Stop reason: SDUPTHER

## 2018-11-05 RX ORDER — ALBUTEROL SULFATE 90 UG/1
2 AEROSOL, METERED RESPIRATORY (INHALATION) EVERY 4 HOURS PRN
COMMUNITY
Start: 2012-03-26

## 2018-11-05 RX ORDER — VERAPAMIL HYDROCHLORIDE 180 MG/1
1 CAPSULE, EXTENDED RELEASE ORAL WEEKLY
COMMUNITY
Start: 2014-09-18

## 2018-11-05 RX ORDER — FLUTICASONE PROPIONATE 50 MCG
2 SPRAY, SUSPENSION (ML) NASAL DAILY
COMMUNITY
Start: 2012-03-26

## 2018-11-05 RX ORDER — IBUPROFEN 200 MG
1 CAPSULE ORAL DAILY
COMMUNITY
Start: 2013-05-21

## 2018-11-05 NOTE — PSYCH
MEDICATION MANAGEMENT NOTE        [address]      Name and Date of Birth:  Kym Ramirez 62 y o  1960 MRN: [id number]    Date of Visit: November 5, 2018    SUBJECTIVE:    Shelbi Branham continues to experience on and off symptoms since the last visit - not seen since November 2017  She still feels depressed frequently, sometimes feels "paralyzed" from depression  She rates mood as 7 on a scale of 1 (best mood) to 10 (worst mood)  She continues to experience frequent anxiety symptoms  She is often stressed out financial issues and problems with her son who has autism  She denies any suicidal ideation, intent or plan at present; denies any homicidal ideation, intent or plan at present  She denies any auditory hallucinations, has no visual hallucinations, no overt delusions noted  She denies any side effects from current psychiatric medications  PHQ-9 is 8 today (decreased from 23 at the last visit)  Alan Cancino HPI ROS Appetite Changes and Sleep: normal sleep, normal appetite, recent weight loss (6 lbs), low energy    Review Of Systems:      Constitutional low energy and recent weight loss (6 lbs)   ENT negative   Cardiovascular negative   Respiratory negative   Gastrointestinal negative   Genitourinary negative   Musculoskeletal knee pain   Integumentary negative   Neurological negative   Endocrine negative   Other Symptoms none        Past Psychiatric History:      No history of past inpatient psychiatric admissions  In outpatient treatment at [address] [place] [address] for many years    Past Suicide Attempts: no  Past Violent Behavior: no  Past Psychiatric Medication Trials: Paxil, Effexor, Wellbutrin and Xanax    Traumatic History:     Abuse: no history of physical or sexual abuse  Other Traumatic Events: none     Past Medical History:    Past Medical History:   Diagnosis Date    Arthritis     Asthma     Atelectasis     Atopic rhinitis     Eczema     Hyperlipidemia     Low back pain     MVP (mitral valve prolapse)     Palpitations     Subconjunctival hemorrhage     Vitamin D deficiency      Past Medical History Pertinent Negatives:   Diagnosis Date Noted    Cancer ([facility] ) 05/02/2018    Head injury     Seizures ([facility] )      No Known Allergies    Substance Abuse History:    History   Alcohol Use    Yes     Comment: Social alcohol use     History   Drug Use No       Social History:    Social History     Social History    Marital status: /Civil Union     Spouse name: N/A    Number of children: 1    Years of education: bachelor's degree      Occupational History    On disability; worked in office management      Social History Main Topics    Smoking status: Never Smoker    Smokeless tobacco: Never Used    Alcohol use Yes      Comment: Social alcohol use    Drug use: No    Sexual activity: Yes     Partners: Male     Other Topics Concern    Not on file     Social History Narrative    Education: bachelor's degree in communications    Learning Disabilities: none    Marital History:     Children: 1 adoptive son     Living Arrangement: lives in home with  and son    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, on permanent disability    Functioning Relationships:  is supportive    Legal History: none     History: None       Family Psychiatric History:     Family History   Problem Relation Age of Onset    Depression Maternal Grandmother     Bipolar disorder Paternal Aunt        History Review:  The following portions of the patient's history were reviewed and updated as appropriate: allergies, current medications, past family history, past medical history, past social history, past surgical history and problem list          OBJECTIVE:     Vital signs in last 24 hours:    Vitals:    11/05/18 1518   BP: 124/81   Pulse: 82   Weight: 75 3 kg (166 lb)   Height: 5' 3" (1 6 m)       Mental Status Evaluation:    Appearance age appropriate, casually dressed   Behavior cooperative, poor eye contact   Speech normal rate, soft, decreased volume   Mood depressed, anxious   Affect constricted   Thought Processes organized, goal directed   Associations intact associations   Thought Content no overt delusions   Perceptual Disturbances: no auditory hallucinations, no visual hallucinations   Abnormal Thoughts  Risk Potential Suicidal ideation - None  Homicidal ideation - None  Potential for aggression - No   Orientation oriented to person, place, time/date and situation   Memory recent and remote memory grossly intact   Consciousness alert and awake   Attention Span Concentration Span attention span and concentration appear shorter than expected for age   Intellect appears to be of average intelligence   Insight intact   Judgement intact   Muscle Strength and  Gait muscle strength and tone were normal, normal gait , normal balance   Motor activity no abnormal movements   Language no difficulty naming common objects, no difficulty repeating a phrase, no difficulty writing a sentence   Fund of Knowledge adequate knowledge of current events  adequate fund of knowledge regarding past history  adequate fund of knowledge regarding vocabulary    Pain moderate   Pain Scale 7       Laboratory Results:  I have personally reviewed all pertinent laboratory/tests results  Recent Labs (last 4 months):   No visits with results within 4 Month(s) from this visit     Latest known visit with results is:   Admission on 05/02/2018, Discharged on 05/02/2018   Component Date Value    WBC 05/02/2018 5 99     RBC 05/02/2018 5 13*    Hemoglobin 05/02/2018 13 6     Hematocrit 05/02/2018 41 2     MCV 05/02/2018 80*    MCH 05/02/2018 26 5*    MCHC 05/02/2018 33 0     RDW 05/02/2018 14 5     MPV 05/02/2018 9 7     Platelets 95/92/1656 276     Neutrophils Relative 05/02/2018 66     Lymphocytes Relative 05/02/2018 26     Monocytes Relative 05/02/2018 6     Eosinophils Relative 05/02/2018 2     Basophils Relative 05/02/2018 0     Neutrophils Absolute 05/02/2018 3 93     Lymphocytes Absolute 05/02/2018 1 58     Monocytes Absolute 05/02/2018 0 36     Eosinophils Absolute 05/02/2018 0 10     Basophils Absolute 05/02/2018 0 02     Sodium 05/02/2018 139     Potassium 05/02/2018 4 3     Chloride 05/02/2018 104     CO2 05/02/2018 29     ANION GAP 05/02/2018 6     BUN 05/02/2018 13     Creatinine 05/02/2018 0 84     Glucose 05/02/2018 88     Calcium 05/02/2018 9 4     AST 05/02/2018 16     ALT 05/02/2018 26     Alkaline Phosphatase 05/02/2018 76     Total Protein 05/02/2018 7 8     Albumin 05/02/2018 4 2     Total Bilirubin 05/02/2018 0 40     eGFR 05/02/2018 89     Lipase 05/02/2018 111     Color, UA 05/02/2018 Yellow     Clarity, UA 05/02/2018 Clear     pH, UA 05/02/2018 5 5     Leukocytes, UA 05/02/2018 Negative     Nitrite, UA 05/02/2018 Negative     Protein, UA 05/02/2018 Negative     Glucose, UA 05/02/2018 Negative     Ketones, UA 05/02/2018 Negative     Urobilinogen, UA 05/02/2018 0 2     Bilirubin, UA 05/02/2018 Negative     Blood, UA 05/02/2018 Trace*    Specific Gravity, UA 05/02/2018 <=1 005     RBC, UA 05/02/2018 0-1*    WBC, UA 05/02/2018 0-1*    Epithelial Cells 05/02/2018 None Seen     Bacteria, UA 05/02/2018 None Seen        Assessment/Plan:       Diagnoses and all orders for this visit:    Major depressive disorder, recurrent severe without psychotic features ([facility] )  -     PARoxetine (PAXIL) 40 MG tablet; Take 1 tablet (40 mg total) by mouth every evening for 180 days  -     ARIPiprazole (ABILIFY) 2 mg tablet; Take 1 tablet (2 mg total) by mouth every evening for 120 days    JOSE ROBERTO (generalized anxiety disorder)  -     ALPRAZolam (XANAX) 0 5 mg tablet; Take 1 tablet (0 5 mg total) by mouth 3 (three) times a day as needed for anxiety for up to 120 days  -     PARoxetine (PAXIL) 40 MG tablet;  Take 1 tablet (40 mg total) by mouth every evening for 180 days    Other orders  -     calcium carbonate (OS-GABE) 1250 (500 Ca) MG tablet; Take 1 tablet by mouth daily  -     fluticasone (FLONASE) 50 mcg/act nasal spray; 2 sprays into each nostril daily  -     Multiple Vitamin (MULTI-VITAMINS PO); Take 1 tablet by mouth daily  -     albuterol (PROAIR HFA) 90 mcg/act inhaler; Inhale 2 puffs every 4 (four) hours as needed  -     verapamil (VERELAN PM) 180 MG 24 hr capsule; Take 1 capsule by mouth once a week  -     Cholecalciferol (VITAMIN D3) 2000 units TABS; Take 1 capsule by mouth daily  -     hydrocortisone 2 5 % cream; Apply topically 2 (two) times a day as needed  -     montelukast (SINGULAIR) 10 mg tablet; Take 10 mg by mouth  -     naproxen (NAPROSYN) 500 mg tablet; Take 500 mg by mouth 2 (two) times a day as needed  -     indomethacin (INDOCIN) 50 mg capsule; Take 50 mg by mouth 3 (three) times a day with meals        Treatment Recommendations/Precautions:    Continue Paxil 40 mg in the evening to improve depressive symptoms  Continue Xanax 0 5 mg tid PRN to improve anxiety symptoms   Add Abilify 2 mg in the evening to help with depressive symptoms and augment an antidepressant  Medication management every 2 months  Does not want to see a therapist  Aware of need to follow up with family physician for glucose and lipid monitoring due to current therapy with antipsychotic medication  Follows with family physician for medical issues    Risks/Benefits      Risks, Benefits And Possible Side Effects Of Medications:    Risks, benefits, and possible side effects of medications explained to Christie Jones including risk of parkinsonian symptoms, Tardive Dyskinesia and metabolic syndrome related to treatment with antipsychotic medications, risk of suicidality related to treatment with antidepressants and risks of dependence, sedation and respiratory depression related to treatment with benzodiazepine medications   She verbalizes understanding and agreement for treatment  Controlled Medication Discussion:     Abdikostas Tonja has been filling controlled prescriptions on time as prescribed according to [facility]    Discussed with Dipak Evangelista the risks of sedation, respiratory depression, impairment of ability to drive and potential for abuse and addiction related to treatment with benzodiazepine medications  She understands risk of treatment with benzodiazepine medications, agrees to not drive if feels impaired and agrees to take medications as prescribed  Psychotherapy Provided:     Individual psychotherapy provided: Yes  Counseling was provided during the session today for 20 minutes  Medications, treatment progress and treatment plan reviewed with Dpiak Evangelista  Medication changes discussed with Dipak Evangelista  Goals discussed during in session: improve control of anxiety and help with depression  Discussed with Dipak Evangelista coping with son's illness and financial problems  Coping strategies including exercising, reducing negative automatic thoughts and reducing time spent worrying reviewed with Dipak Evangelista  Reassurance and supportive therapy provided        Treatment Plan;    Completed and signed during the session: Yes - with Junaid Gonzalez MD 11/05/18

## 2018-11-05 NOTE — PSYCH
[address] [facility]    Name and Date of Birth:  Jossie Goss 62 y o  1960 MRN: [id number]    Date of Visit: 2018    Reason for visit: No chief complaint on file  HPI     Viki Smith is a 62 y o  female with a history of {EFO Psych Diagnosis History:76393} who presents for psychiatric evaluation due to {EFO AMB Psych HPI choices:89506}  Primary complaints include {EFO AMB Psychiatric Symptomatology:57922}  Symptoms first started {EFO AMB EFO Symptom Onset:93542} {Numbers; 0-300:89928} {EFO AMB time units:67842} ago and {EFO AMB EFO Slowly Worsenin} over the last {Numbers; 0-300:57195} {EFO AMB time units:46751}  Stressors preceding visit included {EFO SL IP Psych Stressors:98983}  Viki Smith ***  She {EFO Amb Denies/Reports:58310} {EFO Amb SI/HI:77492::"suicidal ideation, intent or plan at present"}, {EFO Amb Denies/Reports:60622} {EFO Amb HI  ROS:77690::"homicidal ideation, intent or plan at present"}  She {EFO Amb Denies/Reports:76573} {EFO Amb Psychosis:92104}, {EFO Amb Denies/Reports:36218} {EFO Amb Psychosis:68754}, {EFO Amb Denies/Reports:48181} {EFO Amb Psychosis:99417}  She {EFO Amb Denies/Reports Side Effects:43379}      HPI ROS Appetite Changes and Sleep: {EFO AMB Sleep Progress Note:64388::"normal sleep"}, {EFO AMB Appetite Progress Note:77896::"normal appetite"}, {EFO AMB Energy Progress QZDV:14582::"BKAFKW energy level"}    Psychiatric Review Of Systems:    Sleep changes: {EFO Yes/No/Increased/Decreased:34982}  Appetite changes: {EFO Yes/No/Increased/Decreased:93144}  Weight changes: {EFO Yes/No/Increased/Decreased:93275}  Energy/anergy: {EFO Yes/No/Increased/Decreased:50082}  Interest/pleasure/anhedonia: {EFO Yes/No/Increased/Decreased:85314}  Somatic symptoms: {YES/NO:24821::"no"}  Anxiety/panic: {EFO Anxiety Symptoms:12535}  Melinda: {EFO Melinda history:02398}  Guilty/hopeless: {YES/NO:94855::"no"}  Self injurious behavior/risky behavior: {EFO Self Mutilation:20405}  Suicidal ideation: {EFO Suicidal Ideation:40359}  Homicidal ideation: {EFO Homicidal ideation:34989}  Auditory hallucinations: {EFO auditory hallucinations history:06698}  Visual hallucinations: {EFO visual hallucinations:13577}  Other hallucinations: {YES/NO:00269::"no"}  Delusional thinking: {EFO Delusional Thinkin}  Eating disorder history: {EFO Eating Disorder:08498}  Obsessive/compulsive symptoms: {EFO Obsessions:35564}    Review Of Systems:    Mood {EFO AMB PSYCH MOOD ROS:25546}   Behavior {EFO AMB PSYCH BEHAVIOR ROS:30239}   Thought Content {EFO AMB PSYCH THOUGHTS CONTENT ROS:10426}   General {EFO AMB PSYCH GENERAL ROS:22100}   Personality {EFO AMB PSYCH PERSONALITY ROS:18342::"normal"}   Other Psych Symptoms {EFO AMB PSYCH OTHER PSYCH SYMPTOMS ROS:77367::"normal"}   Constitutional {EFO Amb Constitutional ROS:76687}   ENT {EFO Amb ENT ROS:16294::"negative"}   Cardiovascular {EFO Amb Cardiovascular ROS:66777::"negative"}   Respiratory {EFO Amb Respiratory ROS:85560::"negative"}   Gastrointestinal {EFO Amb Gastrointestinal ROS:65515::"negative"}   Genitourinary {EFO Amb Genitourinary ROS:03289::"negative"}   Musculoskeletal {EFO Amb Musculoskeletal ROS:45657::"negative"}   Integumentary {EFO Amb Integumentary ROS:49804::"negative"}   Neurological {EFO Amb Neurological ROS:99128::"negative"}   Endocrine {EFO Amb Endocrine ROS:02697::"negative"}   Other Symptoms {EFO Amb Other Symptoms ROS:53807::"none"}       Past Psychiatric History:      No history of past inpatient psychiatric admissions  In outpatient treatment at [address] [place] [address] E for many years    Past Suicide Attempts: no  Past Violent Behavior: no  Past Psychiatric Medication Trials: Paxil, Effexor, Wellbutrin and Xanax    Traumatic History:     Abuse: no history of physical or sexual abuse  Other Traumatic Events: none     Family Psychiatric History:     Family History   Problem Relation Age of Onset    Depression Other        Substance Use History:    History   Alcohol Use    Yes     Comment: Social alcohol use     History   Drug Use No       Social History:    Social History     Social History    Marital status: /Civil Union     Spouse name: N/A    Number of children: 1    Years of education: bachelor's degree      Occupational History    On disability; worked in office management      Social History Main Topics    Smoking status: Never Smoker    Smokeless tobacco: Never Used    Alcohol use Yes      Comment: Social alcohol use    Drug use: No    Sexual activity: Not on file     Other Topics Concern    Not on file     Social History Narrative    Education: bachelor's degree in communications    Learning Disabilities: none    Marital History:     Children: 3son 15years old (adoptive)    Living Arrangement: lives in home with  and son    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, on permanent disability    Functioning Relationships:  is supportive    Legal History: none     History: None       Past Medical History:    Past Medical History:   Diagnosis Date    Asthma     Atelectasis     Atopic rhinitis     Eczema     Hyperlipidemia     Low back pain     MVP (mitral valve prolapse)     Palpitations     Subconjunctival hemorrhage     Vitamin D deficiency      Past Medical History Pertinent Negatives:   Diagnosis Date Noted    Cancer ([facility] ) 05/02/2018    Head injury     Seizures ([facility] )      Past Surgical History:   Procedure Laterality Date    NO PAST SURGERIES       No Known Allergies    History Review:    {History Review:74121}    OBJECTIVE:    Vital signs in last 24 hours: There were no vitals filed for this visit      Mental Status Evaluation:    Appearance {EFO AMB EXAM; GENERAL PSYCH:10508::"age appropriate","casually dressed"}   Behavior {EFO AMB Exam; behavior:88403::"cooperative","calm"}   Speech {EFO AMB EXAM; speech:38894::"normal rate","normal volume","normal pitch"}   Mood {EFO EXAM; MOOD LESS/MORE:43675}   Affect {EFO AFFECT LESS/MORE:83460}   Thought Processes {EFO AMB THOUGHT PROCESS:25174::"organized","goal directed"}   Associations {EFO AMB THOUGHT ASSOCIATIONS:87997::"intact associations"}   Thought Content {EFO AMB Exam; thought content:53953::"no overt delusions"}   Perceptual Disturbances: {EFO AMB Perceptual Disturbances:95870::"no auditory hallucinations","no visual hallucinations"}   Abnormal Thoughts  Risk Potential Suicidal ideation - {EFO Amb Suicidal Thoughts:46806::"None"}  Homicidal ideation - {EFO Amb HI:03477::"None"}  Potential for aggression - {EFO Potential for aggression:08774::"No"}   Orientation {EFO AMB ORIENTED/DISORIENTED:05691}   Memory {EFO AMB EXAM; PSYCH COGNITION:27860::"recent and remote memory grossly intact"}   Consciousness {EFO AMB Consciousness:36646::"alert","awake"}   Attention Span Concentration Span {EFO AMB EXAM ATTENTION:61093::"attention span and concentration are age appropriate"}   Intellect {EFO AMB INTELLECTUAL FUNC:60558::"appears to be of average intelligence"}   Insight {EFO AMB EXAM; PSYCH INSIGHT/JUDGEMENT:31970::"intact"}   Judgement {EFO AMB EXAM; PSYCH INSIGHT/JUDGEMENT:35676::"intact"}   Muscle Strength and  Gait {[facility] AMB PSYCH MUSCLE STRENGHT:07113::"muscle strength and tone were normal","normal gait ","normal balance"}   Motor Activity {[facility] IP Psych Motor Activity:80498}   Language {[facility] AMB PSYCH MENTAL STATUS LANGUAGE:42553::"no difficulty naming common objects","no difficulty repeating a phrase","no difficulty writing a sentence"}   Fund of Knowledge {EF AMB PSYCH MENTAL STATUS FUND OF KNOWLEDGE:21551::"adequate knowledge of current events","adequate fund of knowledge regarding past history","adequate fund of knowledge regarding vocabulary "}   [facility] LDDQ:03050}   Pain Scale {EFO PAIN SCALE NUMBERS:62929}       Laboratory Results: {EFO AMB Labs:08941::"I have personally reviewed all pertinent laboratory/tests results  "}    Assessment/Plan:      There are no diagnoses linked to this encounter  Treatment Recommendations:    Continue Paxil 40 mg in the evening to improve depressive symptoms  Continue Xanax 0 5 mg tid PRN to improve anxiety symptoms  Medication management every {number:25498} {weeks/months:65654}    Risks/Benefits/Precautions:      Risks, Benefits And Possible Side Effects Of Medications:    {EFO AMB RISKS/BENEFITS MEDICATIONS:34552}    Controlled Medication Discussion:     Ankur Stone has been filling controlled prescriptions on time as prescribed according to [place] [facility] program    Discussed with Ankur Stone the risks of sedation, respiratory depression, impairment of ability to drive and potential for abuse and addiction related to treatment with benzodiazepine medications  She understands risk of treatment with benzodiazepine medications, agrees to not drive if feels impaired and agrees to take medications as prescribed      Treatment Plan;    Completed and signed during the session: {EFO Treatment Plan Session:77155}    Jacob Kapoor MD 11/05/18

## 2018-11-05 NOTE — PSYCH
TREATMENT PLAN (Medication Management Only)        [address] [employer]    Name/Date of Birth/MRN:  Gary Ken 62 y o  1960 MRN: [id number]  Date of Treatment Plan: November 5, 2018  Diagnosis/Diagnoses:   1  Major depressive disorder, recurrent severe without psychotic features ([facility] )    2  JOSE ROBERTO (generalized anxiety disorder)      Strengths/Personal Resources for Self-Care: "working with autistic son to improve his future  Working for transitional resources"  Area/Areas of need (in own words): "desire to exercise so no dependent on medication"  1  Long Term Goal: improve control of depression  Target Date: 2 months - 1/5/2019  Person/Persons responsible for completion of goal: Jason  2  Short Term Objective (s) - How will we reach this goal?:   A  Provider new recommended medication/dosage changes and/or continue medication(s): start Abilify, continue all other medications (Paxil and Xanax)  B   N/A   C   N/A  Target Date: 2 months - 1/5/2019  Person/Persons Responsible for Completion of Goal: Jason   Progress Towards Goals: limited progress  Treatment Modality: medication management every 2 months  Review due 90 to 120 days from date of this plan: 4 months - 3/5/2019  Expected length of service: ongoing treatment  My Physician/PA/NP and I have developed this plan together and I agree to work on the goals and objectives  I understand the treatment goals that were developed for my treatment    Signature:       Date and time:  Signature of parent/guardian if under age of 15 years: Date and time:  Signature of provider:      Date and time:  Signature of Supervising Physician:    Date and time: 11/5/2018      Khadra Ferrell MD

## 2018-11-09 ENCOUNTER — DOCUMENTATION (OUTPATIENT)
Dept: PSYCHIATRY | Facility: CLINIC | Age: 58
End: 2018-11-09

## 2018-11-09 NOTE — PROGRESS NOTES
# 1Treatment Plan not completed within required time limits due to : Provider will be out of the office 11/16/18  and will reschedule at a later time

## 2019-03-01 NOTE — PSYCH
MEDICATION MANAGEMENT NOTE        [facility]      Name and Date of Birth:  Carla Riley 62 y o  1960 MRN: [id number]    Date of Visit: March 6, 2019    SUBJECTIVE:    Barney Church is seen today for a follow up for Major Depressive Disorder and generalized anxiety disorder  She reports that she continues to experience ongoing symptoms since the last visit  She still feels depressed and stressed out, frustrated that her sister-in-law has been staying with her children at the home (sister-in-law lost her house in a flood)  She continues to report on and off anxiety symptoms  She is worrying less about financial issues as her disability was approved last year  She denies any suicidal ideation, intent or plan at present; denies any homicidal ideation, intent or plan at present  She has no auditory hallucinations, denies any visual hallucinations, has no delusional thinking  She denies any side effects from current psychiatric medications  PHQ-9 is 22 today (increased from 8 at the last visit)  Lajean Cassette HPI ROS Appetite Changes and Sleep:     She reports normal sleep, normal appetite, recent weight gain (7 lbs), decreased energy    Review Of Systems:      Constitutional low energy and recent weight gain (7 lbs)   ENT negative   Cardiovascular negative   Respiratory negative   Gastrointestinal negative   Genitourinary negative   Musculoskeletal negative   Integumentary negative   Neurological negative   Endocrine negative   Other Symptoms none       Past Psychiatric History:      No history of past inpatient psychiatric admissions  In outpatient treatment at [address] [place] [address] E for many years    Past Suicide Attempts: no  Past Violent Behavior: no  Past Psychiatric Medication Trials: Paxil, Effexor, Wellbutrin and Xanax     Traumatic History:      Abuse: no history of physical or sexual abuse  Other Traumatic Events: none      Past Medical History:    Past Medical History:   Diagnosis Date    Arthritis     Asthma     Atelectasis     Atopic rhinitis     Eczema     Hyperlipidemia     Low back pain     MVP (mitral valve prolapse)     Palpitations     Subconjunctival hemorrhage     Vitamin D deficiency      Past Medical History Pertinent Negatives:   Diagnosis Date Noted    Cancer ([facility] ) 05/02/2018    Head injury     Seizures ([facility] )      Past Surgical History:   Procedure Laterality Date    NO PAST SURGERIES       No Known Allergies    Substance Abuse History:    Social History     Substance and Sexual Activity   Alcohol Use Yes    Frequency: Monthly or less    Drinks per session: 1 or 2    Binge frequency: Never    Comment: Social alcohol use     Social History     Substance and Sexual Activity   Drug Use No       Social History:    Social History     Socioeconomic History    Marital status: /Civil Union     Spouse name: Not on file    Number of children: 1    Years of education: bachelor's degree     Highest education level: Bachelor's degree (e g , BA, AB, BS)   Occupational History    Occupation: On disability; worked in office management   Social Needs    Financial resource strain: Not on file    Food insecurity:     Worry: Never true     Inability: Never true    Transportation needs:     Medical: No     Non-medical: No   Tobacco Use    Smoking status: Never Smoker    Smokeless tobacco: Never Used   Substance and Sexual Activity    Alcohol use: Yes     Frequency: Monthly or less     Drinks per session: 1 or 2     Binge frequency: Never     Comment: Social alcohol use    Drug use: No    Sexual activity: Yes     Partners: Male   Lifestyle    Physical activity:     Days per week: 1 day     Minutes per session: 30 min    Stress:  To some extent   Relationships    Social connections:     Talks on phone: Never     Gets together: Never     Attends [employer] service: More than 4 times per year     Active member of club or organization: No     Attends meetings of clubs or organizations: Never     Relationship status:     Intimate partner violence:     Fear of current or ex partner: No     Emotionally abused: No     Physically abused: No     Forced sexual activity: No   Other Topics Concern    Not on file   Social History Narrative    Education: bachelor's degree in communications    Learning Disabilities: none    Marital History:     Children: 1 adoptive son     Living Arrangement: lives in home with  and son; also her sister and sister-in-law with children are staying with her    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, on permanent disability    Functioning Relationships:  is supportive    Legal History: none     History: None       Family Psychiatric History:     Family History   Problem Relation Age of Onset    Depression Maternal Grandmother     Bipolar disorder Paternal Aunt        History Review:  The following portions of the patient's history were reviewed and updated as appropriate: allergies, current medications, past family history, past medical history, past social history, past surgical history and problem list          OBJECTIVE:     Vital signs in last 24 hours:    Vitals:    03/06/19 1611   BP: 148/91   Pulse: 84   Weight: 78 5 kg (173 lb)   Height: 5' 3" (1 6 m)       Mental Status Evaluation:    Appearance age appropriate, casually dressed   Behavior cooperative, appears anxious, poor eye contact   Speech normal rate, soft, decreased volume   Mood depressed, anxious   Affect constricted   Thought Processes organized, goal directed   Associations intact associations   Thought Content no overt delusions   Perceptual Disturbances: no auditory hallucinations, no visual hallucinations   Abnormal Thoughts  Risk Potential Suicidal ideation - None  Homicidal ideation - None  Potential for aggression - No   Orientation oriented to person, place, time/date and situation   Memory recent and remote memory grossly intact   Consciousness alert and awake   Attention Span Concentration Span normal attention span  normal concentration   Intellect appears to be of average intelligence   Insight intact   Judgement intact   Muscle Strength and  Gait normal muscle strength and normal muscle tone, normal gait and normal balance   Motor activity no abnormal movements   Language no difficulty naming common objects, no difficulty repeating a phrase, no difficulty writing a sentence   Fund of Knowledge adequate knowledge of current events  adequate fund of knowledge regarding past history  adequate fund of knowledge regarding vocabulary    Pain none   Pain Scale 0       Laboratory Results: I have personally reviewed all pertinent laboratory/tests results  Recent Labs (last 4 months):   No visits with results within 4 Month(s) from this visit     Latest known visit with results is:   Admission on 05/02/2018, Discharged on 05/02/2018   Component Date Value    WBC 05/02/2018 5 99     RBC 05/02/2018 5 13*    Hemoglobin 05/02/2018 13 6     Hematocrit 05/02/2018 41 2     MCV 05/02/2018 80*    MCH 05/02/2018 26 5*    MCHC 05/02/2018 33 0     RDW 05/02/2018 14 5     MPV 05/02/2018 9 7     Platelets 21/52/2278 276     Neutrophils Relative 05/02/2018 66     Lymphocytes Relative 05/02/2018 26     Monocytes Relative 05/02/2018 6     Eosinophils Relative 05/02/2018 2     Basophils Relative 05/02/2018 0     Neutrophils Absolute 05/02/2018 3 93     Lymphocytes Absolute 05/02/2018 1 58     Monocytes Absolute 05/02/2018 0 36     Eosinophils Absolute 05/02/2018 0 10     Basophils Absolute 05/02/2018 0 02     Sodium 05/02/2018 139     Potassium 05/02/2018 4 3     Chloride 05/02/2018 104     CO2 05/02/2018 29     ANION GAP 05/02/2018 6     BUN 05/02/2018 13     Creatinine 05/02/2018 0 84     Glucose 05/02/2018 88     Calcium 05/02/2018 9 4     AST 05/02/2018 16     ALT 05/02/2018 26     Alkaline Phosphatase 05/02/2018 76     Total Protein 05/02/2018 7 8     Albumin 05/02/2018 4 2     Total Bilirubin 05/02/2018 0 40     eGFR 05/02/2018 89     Lipase 05/02/2018 111     Color, UA 05/02/2018 Yellow     Clarity, UA 05/02/2018 Clear     pH, UA 05/02/2018 5 5     Leukocytes, UA 05/02/2018 Negative     Nitrite, UA 05/02/2018 Negative     Protein, UA 05/02/2018 Negative     Glucose, UA 05/02/2018 Negative     Ketones, UA 05/02/2018 Negative     Urobilinogen, UA 05/02/2018 0 2     Bilirubin, UA 05/02/2018 Negative     Blood, UA 05/02/2018 Trace*    Specific Gravity, UA 05/02/2018 <=1 005     RBC, UA 05/02/2018 0-1*    WBC, UA 05/02/2018 0-1*    Epithelial Cells 05/02/2018 None Seen     Bacteria, UA 05/02/2018 None Seen        Assessment/Plan:       Diagnoses and all orders for this visit:    Major depressive disorder, recurrent severe without psychotic features ([facility] [address] )  -     PARoxetine (PAXIL) 40 MG tablet; Take 1 tablet (40 mg total) by mouth every evening for 180 days  -     ARIPiprazole (ABILIFY) 5 mg tablet; Take 1 tablet (5 mg total) by mouth every evening for 180 days    JOSE ROBERTO (generalized anxiety disorder)  -     ALPRAZolam (XANAX) 0 5 mg tablet; Take 1 tablet (0 5 mg total) by mouth 3 (three) times a day as needed for anxiety for up to 120 days  -     PARoxetine (PAXIL) 40 MG tablet;  Take 1 tablet (40 mg total) by mouth every evening for 180 days    Other orders  -     multivitamin (THERAGRAN) TABS; Take 1 tablet by mouth daily        Treatment Recommendations/Precautions:    Continue Paxil 40 mg in the evening to improve depressive symptoms  Continue Xanax 0 5 mg tid PRN to improve anxiety symptoms   Increase Abilify to 5 mg in the evening to help with depressive symptoms and augment an antidepressant  Medication management every 2 months  Referral for individual psychotherapy  Follows with family physician for glucose and lipid monitoring due to current therapy with antipsychotic medication  Follows with family physician for medical issues    Risks/Benefits      Risks, Benefits And Possible Side Effects Of Medications:    Risks, benefits, and possible side effects of medications explained to Mariah Cote including risk of parkinsonian symptoms, Tardive Dyskinesia and metabolic syndrome related to treatment with antipsychotic medications, risk of suicidality and serotonin syndrome related to treatment with antidepressants and risks of dependence, sedation and respiratory depression related to treatment with benzodiazepine medications  She verbalizes understanding and agreement for treatment  Controlled Medication Discussion:     Mariah Cote has been filling controlled prescriptions on time as prescribed according to [facility] [address]    Discussed with Mariah Cote the risks of sedation, respiratory depression, impairment of ability to drive and potential for abuse and addiction related to treatment with benzodiazepine medications  She understands risk of treatment with benzodiazepine medications, agrees to not drive if feels impaired and agrees to take medications as prescribed  Psychotherapy Provided:     Individual psychotherapy provided: Yes  Counseling was provided during the session today for 20 minutes  Medications, treatment progress and treatment plan reviewed with Mariah Cote  Medication changes discussed with Mariah Cote  Goals discussed during in session: lessen anxiety and improve control of depression  Discussed with Mariah Cote coping with family issues, living situation and raising son with autism  Coping mechanisms including exercising and doreen reviewed with Mariah Cote  Supportive therapy provided        Treatment Plan;    Completed and signed during the session: Yes - with Makayla Sanchez MD 03/06/19

## 2019-03-06 ENCOUNTER — OFFICE VISIT (OUTPATIENT)
Dept: PSYCHIATRY | Facility: CLINIC | Age: 59
End: 2019-03-06
Payer: COMMERCIAL

## 2019-03-06 VITALS
BODY MASS INDEX: 30.65 KG/M2 | DIASTOLIC BLOOD PRESSURE: 91 MMHG | SYSTOLIC BLOOD PRESSURE: 148 MMHG | HEIGHT: 63 IN | WEIGHT: 173 LBS | HEART RATE: 84 BPM

## 2019-03-06 DIAGNOSIS — F41.1 GAD (GENERALIZED ANXIETY DISORDER): Chronic | ICD-10-CM

## 2019-03-06 DIAGNOSIS — F33.2 MAJOR DEPRESSIVE DISORDER, RECURRENT SEVERE WITHOUT PSYCHOTIC FEATURES (HCC): Primary | Chronic | ICD-10-CM

## 2019-03-06 PROCEDURE — 99213 OFFICE O/P EST LOW 20 MIN: CPT | Performed by: PSYCHIATRY & NEUROLOGY

## 2019-03-06 PROCEDURE — 90833 PSYTX W PT W E/M 30 MIN: CPT | Performed by: PSYCHIATRY & NEUROLOGY

## 2019-03-06 RX ORDER — PAROXETINE HYDROCHLORIDE 40 MG/1
40 TABLET, FILM COATED ORAL EVERY EVENING
Qty: 90 TABLET | Refills: 1 | Status: SHIPPED | OUTPATIENT
Start: 2019-03-06 | End: 2019-10-30 | Stop reason: SDUPTHER

## 2019-03-06 RX ORDER — DIPHENOXYLATE HYDROCHLORIDE AND ATROPINE SULFATE 2.5; .025 MG/1; MG/1
1 TABLET ORAL DAILY
COMMUNITY

## 2019-03-06 RX ORDER — ARIPIPRAZOLE 5 MG/1
5 TABLET ORAL EVERY EVENING
Qty: 90 TABLET | Refills: 1 | Status: SHIPPED | OUTPATIENT
Start: 2019-03-06 | End: 2019-10-30 | Stop reason: SDUPTHER

## 2019-03-06 RX ORDER — ALPRAZOLAM 0.5 MG/1
0.5 TABLET ORAL 3 TIMES DAILY PRN
Qty: 90 TABLET | Refills: 3 | Status: SHIPPED | OUTPATIENT
Start: 2019-03-06 | End: 2019-10-30 | Stop reason: SDUPTHER

## 2019-03-06 NOTE — PSYCH
TREATMENT PLAN (Medication Management Only)        [address]    Name/Date of Birth/MRN:  Deshawn Bob 62 y o  1960 MRN: [id number]  Date of Treatment Plan: March 6, 2019  Diagnosis/Diagnoses:   1  Major depressive disorder, recurrent severe without psychotic features ([facility] [address] )    2  JOSE ROBERTO (generalized anxiety disorder)      Strengths/Personal Resources for Self-Care: "belief in the [place], exercise"  Area/Areas of need (in own words): "toots to overcome depression and anxiety, tools to get motivated"  1  Long Term Goal: improve control of depression  Target Date: 2 months - 5/6/2019  Person/Persons responsible for completion of goal: Kris Ramirez  2  Short Term Objective (s) - How will we reach this goal?:   A  Provider new recommended medication/dosage changes and/or continue medication(s): increase Abilify, continue all other medications (Paxil and Xanax)  B   N/A   C   N/A  Target Date: 2 months - 5/6/2019  Person/Persons Responsible for Completion of Goal: Kris Ramirez   Progress Towards Goals: limited progress  Treatment Modality: medication management every 2 months, referral for individual psychotherapy  Review due 90 to 120 days from date of this plan: 4 months - 7/6/2019  Expected length of service: ongoing treatment  My Physician/PA/NP and I have developed this plan together and I agree to work on the goals and objectives  I understand the treatment goals that were developed for my treatment    Signature:       Date and time:  Signature of parent/guardian if under age of 15 years: Date and time:  Signature of provider:      Date and time:  Signature of Supervising Physician:    Date and time: 3/6/2019      Samantha Dalton MD

## 2019-03-07 ENCOUNTER — TELEPHONE (OUTPATIENT)
Dept: PSYCHIATRY | Facility: CLINIC | Age: 59
End: 2019-03-07

## 2019-03-07 NOTE — TELEPHONE ENCOUNTER
----- Message from Dieter Mckinley MD sent at 3/6/2019  4:26 PM EST -----  Please call Calixto Luis - she would like to restart therapy  Saw SHANTE Cabrera in the past

## 2019-03-08 ENCOUNTER — DOCUMENTATION (OUTPATIENT)
Dept: PSYCHIATRY | Facility: CLINIC | Age: 59
End: 2019-03-08

## 2019-03-08 NOTE — PROGRESS NOTES
Treatment Plan done but not signed at time of office visit due to:  Leaving before signing Tx Plan at office visit 3/6/2019 , will review and sign at next OV

## 2019-05-14 ENCOUNTER — DOCUMENTATION (OUTPATIENT)
Dept: PSYCHIATRY | Facility: CLINIC | Age: 59
End: 2019-05-14

## 2019-05-16 ENCOUNTER — OFFICE VISIT (OUTPATIENT)
Dept: BEHAVIORAL/MENTAL HEALTH CLINIC | Facility: CLINIC | Age: 59
End: 2019-05-16
Payer: COMMERCIAL

## 2019-05-16 DIAGNOSIS — F33.2 MAJOR DEPRESSIVE DISORDER, RECURRENT SEVERE WITHOUT PSYCHOTIC FEATURES (HCC): Chronic | ICD-10-CM

## 2019-05-16 DIAGNOSIS — F41.1 GAD (GENERALIZED ANXIETY DISORDER): Primary | Chronic | ICD-10-CM

## 2019-05-16 PROCEDURE — 90791 PSYCH DIAGNOSTIC EVALUATION: CPT | Performed by: SOCIAL WORKER

## 2019-05-24 ENCOUNTER — OFFICE VISIT (OUTPATIENT)
Dept: BEHAVIORAL/MENTAL HEALTH CLINIC | Facility: CLINIC | Age: 59
End: 2019-05-24
Payer: COMMERCIAL

## 2019-05-24 DIAGNOSIS — F41.1 GAD (GENERALIZED ANXIETY DISORDER): Chronic | ICD-10-CM

## 2019-05-24 DIAGNOSIS — F33.2 MAJOR DEPRESSIVE DISORDER, RECURRENT SEVERE WITHOUT PSYCHOTIC FEATURES (HCC): Chronic | ICD-10-CM

## 2019-05-24 DIAGNOSIS — F41.1 GAD (GENERALIZED ANXIETY DISORDER): Primary | Chronic | ICD-10-CM

## 2019-05-24 PROCEDURE — 90834 PSYTX W PT 45 MINUTES: CPT | Performed by: SOCIAL WORKER

## 2019-05-24 RX ORDER — PAROXETINE HYDROCHLORIDE 40 MG/1
TABLET, FILM COATED ORAL
Qty: 90 TABLET | Refills: 1 | OUTPATIENT
Start: 2019-05-24

## 2019-07-18 ENCOUNTER — TRANSCRIBE ORDERS (OUTPATIENT)
Dept: LAB | Facility: CLINIC | Age: 59
End: 2019-07-18

## 2019-07-18 ENCOUNTER — APPOINTMENT (OUTPATIENT)
Dept: LAB | Facility: CLINIC | Age: 59
End: 2019-07-18
Payer: COMMERCIAL

## 2019-07-18 DIAGNOSIS — R53.83 FATIGUE, UNSPECIFIED TYPE: ICD-10-CM

## 2019-07-18 DIAGNOSIS — E55.9 AVITAMINOSIS D: ICD-10-CM

## 2019-07-18 DIAGNOSIS — E78.5 HYPERLIPIDEMIA, UNSPECIFIED HYPERLIPIDEMIA TYPE: ICD-10-CM

## 2019-07-18 DIAGNOSIS — E55.9 AVITAMINOSIS D: Primary | ICD-10-CM

## 2019-07-18 LAB
25(OH)D3 SERPL-MCNC: 22.3 NG/ML (ref 30–100)
ALBUMIN SERPL BCP-MCNC: 3.9 G/DL (ref 3.5–5)
ALP SERPL-CCNC: 81 U/L (ref 46–116)
ALT SERPL W P-5'-P-CCNC: 23 U/L (ref 12–78)
ANION GAP SERPL CALCULATED.3IONS-SCNC: 5 MMOL/L (ref 4–13)
AST SERPL W P-5'-P-CCNC: 15 U/L (ref 5–45)
BASOPHILS # BLD AUTO: 0.03 THOUSANDS/ΜL (ref 0–0.1)
BASOPHILS NFR BLD AUTO: 1 % (ref 0–1)
BILIRUB SERPL-MCNC: 0.4 MG/DL (ref 0.2–1)
BUN SERPL-MCNC: 15 MG/DL (ref 5–25)
CALCIUM SERPL-MCNC: 9.5 MG/DL (ref 8.3–10.1)
CHLORIDE SERPL-SCNC: 103 MMOL/L (ref 100–108)
CHOLEST SERPL-MCNC: 228 MG/DL (ref 50–200)
CO2 SERPL-SCNC: 33 MMOL/L (ref 21–32)
CREAT SERPL-MCNC: 1.02 MG/DL (ref 0.6–1.3)
EOSINOPHIL # BLD AUTO: 0.07 THOUSAND/ΜL (ref 0–0.61)
EOSINOPHIL NFR BLD AUTO: 1 % (ref 0–6)
ERYTHROCYTE [DISTWIDTH] IN BLOOD BY AUTOMATED COUNT: 14.7 % (ref 11.6–15.1)
GFR SERPL CREATININE-BSD FRML MDRD: 70 ML/MIN/1.73SQ M
GLUCOSE P FAST SERPL-MCNC: 87 MG/DL (ref 65–99)
HCT VFR BLD AUTO: 42.3 % (ref 34.8–46.1)
HDLC SERPL-MCNC: 66 MG/DL (ref 40–60)
HGB BLD-MCNC: 13.1 G/DL (ref 11.5–15.4)
IMM GRANULOCYTES # BLD AUTO: 0.01 THOUSAND/UL (ref 0–0.2)
IMM GRANULOCYTES NFR BLD AUTO: 0 % (ref 0–2)
LDLC SERPL CALC-MCNC: 152 MG/DL (ref 0–100)
LYMPHOCYTES # BLD AUTO: 1.57 THOUSANDS/ΜL (ref 0.6–4.47)
LYMPHOCYTES NFR BLD AUTO: 32 % (ref 14–44)
MCH RBC QN AUTO: 26.5 PG (ref 26.8–34.3)
MCHC RBC AUTO-ENTMCNC: 31 G/DL (ref 31.4–37.4)
MCV RBC AUTO: 86 FL (ref 82–98)
MONOCYTES # BLD AUTO: 0.36 THOUSAND/ΜL (ref 0.17–1.22)
MONOCYTES NFR BLD AUTO: 7 % (ref 4–12)
NEUTROPHILS # BLD AUTO: 2.82 THOUSANDS/ΜL (ref 1.85–7.62)
NEUTS SEG NFR BLD AUTO: 59 % (ref 43–75)
NONHDLC SERPL-MCNC: 162 MG/DL
NRBC BLD AUTO-RTO: 0 /100 WBCS
PLATELET # BLD AUTO: 262 THOUSANDS/UL (ref 149–390)
PMV BLD AUTO: 9.2 FL (ref 8.9–12.7)
POTASSIUM SERPL-SCNC: 4.1 MMOL/L (ref 3.5–5.3)
PROT SERPL-MCNC: 7.7 G/DL (ref 6.4–8.2)
RBC # BLD AUTO: 4.94 MILLION/UL (ref 3.81–5.12)
SODIUM SERPL-SCNC: 141 MMOL/L (ref 136–145)
TRIGL SERPL-MCNC: 49 MG/DL
TSH SERPL DL<=0.05 MIU/L-ACNC: 1.25 UIU/ML (ref 0.36–3.74)
WBC # BLD AUTO: 4.86 THOUSAND/UL (ref 4.31–10.16)

## 2019-07-18 PROCEDURE — 84443 ASSAY THYROID STIM HORMONE: CPT

## 2019-07-18 PROCEDURE — 85025 COMPLETE CBC W/AUTO DIFF WBC: CPT

## 2019-07-18 PROCEDURE — 80061 LIPID PANEL: CPT

## 2019-07-18 PROCEDURE — 36415 COLL VENOUS BLD VENIPUNCTURE: CPT

## 2019-07-18 PROCEDURE — 80053 COMPREHEN METABOLIC PANEL: CPT

## 2019-07-18 PROCEDURE — 82306 VITAMIN D 25 HYDROXY: CPT

## 2019-07-19 ENCOUNTER — SOCIAL WORK (OUTPATIENT)
Dept: BEHAVIORAL/MENTAL HEALTH CLINIC | Facility: CLINIC | Age: 59
End: 2019-07-19
Payer: COMMERCIAL

## 2019-07-19 DIAGNOSIS — F41.1 GAD (GENERALIZED ANXIETY DISORDER): Primary | Chronic | ICD-10-CM

## 2019-07-19 DIAGNOSIS — F33.2 MAJOR DEPRESSIVE DISORDER, RECURRENT SEVERE WITHOUT PSYCHOTIC FEATURES (HCC): Chronic | ICD-10-CM

## 2019-07-19 PROCEDURE — 90834 PSYTX W PT 45 MINUTES: CPT | Performed by: SOCIAL WORKER

## 2019-07-19 NOTE — PSYCH
Psychotherapy Provided: Individual Psychotherapy 45 minutes     Length of time in session: 45 minutes, follow up in 1 month    Goals addressed in session: Goal 1 and Goal 2     Pain:      none    0    Current suicide risk : Low     D- Bobo Carrington stated that she has been feeling fatigued and a lack of motivation  She stated that she is uncertain as to why this is  Discussing her medications as well as all of her responsibilities  She discussed the care that her son requires due to being autistic  Discussing ways for her to practice self care and also have her  participate more in her sons care  Giving supportive therapy  A- Progress - continuing to process her emotions  P-Continue treatment    [address]: Diagnosis and Treatment Plan explained to Delaney Moyer relates understanding diagnosis and is agreeable to Treatment Plan   Yes

## 2019-08-13 DIAGNOSIS — F33.2 MAJOR DEPRESSIVE DISORDER, RECURRENT SEVERE WITHOUT PSYCHOTIC FEATURES (HCC): Chronic | ICD-10-CM

## 2019-08-13 RX ORDER — ARIPIPRAZOLE 5 MG/1
TABLET ORAL
Qty: 90 TABLET | Refills: 1 | OUTPATIENT
Start: 2019-08-13

## 2019-10-24 ENCOUNTER — TELEPHONE (OUTPATIENT)
Dept: BEHAVIORAL/MENTAL HEALTH CLINIC | Facility: CLINIC | Age: 59
End: 2019-10-24

## 2019-10-30 DIAGNOSIS — F33.2 MAJOR DEPRESSIVE DISORDER, RECURRENT SEVERE WITHOUT PSYCHOTIC FEATURES (HCC): Primary | Chronic | ICD-10-CM

## 2019-10-30 DIAGNOSIS — F41.1 GAD (GENERALIZED ANXIETY DISORDER): Chronic | ICD-10-CM

## 2019-10-30 RX ORDER — ALPRAZOLAM 0.5 MG/1
0.5 TABLET ORAL 3 TIMES DAILY PRN
Qty: 90 TABLET | Refills: 0 | Status: SHIPPED | OUTPATIENT
Start: 2019-10-30 | End: 2019-11-14 | Stop reason: SDUPTHER

## 2019-10-30 RX ORDER — PAROXETINE HYDROCHLORIDE 40 MG/1
40 TABLET, FILM COATED ORAL EVERY EVENING
Qty: 30 TABLET | Refills: 0 | Status: SHIPPED | OUTPATIENT
Start: 2019-10-30 | End: 2019-11-14 | Stop reason: SDUPTHER

## 2019-10-30 RX ORDER — ARIPIPRAZOLE 5 MG/1
5 TABLET ORAL EVERY EVENING
Qty: 30 TABLET | Refills: 0 | Status: SHIPPED | OUTPATIENT
Start: 2019-10-30 | End: 2019-11-14 | Stop reason: SDUPTHER

## 2019-11-11 NOTE — PSYCH
MEDICATION MANAGEMENT NOTE        [facility]      Name and Date of Birth:  Chuck Catherine 61 y o  1960 MRN: [id number]    Date of Visit: November 14, 2019    SUBJECTIVE:    Ashwini Coles is seen today for a follow up for Major Depressive Disorder and Generalized Anxiety Disorder  She continues to experience on and off symptoms since the last visit  She feels depressed on and off "it varies", rates mood as 8 on a scale of 1 (best mood) to 10 (worst mood) today  She still has anxiety symptoms often and feels stressed out with issues with her son  She denies any suicidal ideation, intent or plan at present; denies any homicidal ideation, intent or plan at present  She has no auditory hallucinations, denies any visual hallucinations, no overt delusions noted  She denies any side effects from current psychiatric medications  PHQ-9 is 17 today (decreased from 22 at the last visit)  Cole Holliday HPI ROS Appetite Changes and Sleep:     She reports normal sleep, normal appetite, recent weight loss (7 lbs) - intentional, low energy    Review Of Systems:      Constitutional low energy and recent weight loss (7 lbs)   ENT negative   Cardiovascular negative   Respiratory negative   Gastrointestinal negative   Genitourinary negative   Musculoskeletal negative   Integumentary negative   Neurological negative   Endocrine negative   Other Symptoms all other systems are negative       Past Psychiatric History:      No history of past inpatient psychiatric admissions  In outpatient treatment at [address] [place] [address] for many years    Past Suicide Attempts: no  Past Violent Behavior: no  Past Psychiatric Medication Trials: Paxil, Prozac, Zoloft, Effexor, Wellbutrin and Xanax     Traumatic History:      Abuse: no history of physical or sexual abuse  Other Traumatic Events: none     Past Medical History:    Past Medical History:   Diagnosis Date    Arthritis     Asthma     Atelectasis     Atopic rhinitis     Eczema     Hyperlipidemia     Low back pain     MVP (mitral valve prolapse)     Palpitations     Subconjunctival hemorrhage     Vitamin D deficiency      Past Medical History Pertinent Negatives:   Diagnosis Date Noted    Cancer ([facility] ) 05/02/2018    Head injury     Seizures ([facility] )      Past Surgical History:   Procedure Laterality Date    NO PAST SURGERIES       No Known Allergies    Substance Abuse History:    Social History     Substance and Sexual Activity   Alcohol Use Yes    Frequency: Monthly or less    Drinks per session: 1 or 2    Binge frequency: Never    Comment: Social alcohol use     Social History     Substance and Sexual Activity   Drug Use No       Social History:    Social History     Socioeconomic History    Marital status: /Civil Union     Spouse name: Not on file    Number of children: 1    Years of education: bachelor's degree     Highest education level: Bachelor's degree (e g , BA, AB, BS)   Occupational History    Occupation: On disability; worked in office management   Social Needs    Financial resource strain: Not on file    Food insecurity:     Worry: Never true     Inability: Never true    Transportation needs:     Medical: No     Non-medical: No   Tobacco Use    Smoking status: Never Smoker    Smokeless tobacco: Never Used   Substance and Sexual Activity    Alcohol use: Yes     Frequency: Monthly or less     Drinks per session: 1 or 2     Binge frequency: Never     Comment: Social alcohol use    Drug use: No    Sexual activity: Yes     Partners: Male   Lifestyle    Physical activity:     Days per week: 1 day     Minutes per session: 30 min    Stress:  To some extent   Relationships    Social connections:     Talks on phone: Never     Gets together: Never     Attends Worship service: More than 4 times per year     Active member of club or organization: No     Attends meetings of clubs or organizations: Never Relationship status:     Intimate partner violence:     Fear of current or ex partner: No     Emotionally abused: No     Physically abused: No     Forced sexual activity: No   Other Topics Concern    Not on file   Social History Narrative    Education: bachelor's degree in communications    Learning Disabilities: none    Marital History:     Children: 1 adoptive son     Living Arrangement: lives in home with  and son    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, on permanent disability    Functioning Relationships:  is supportive    Legal History: none     History: None       Family Psychiatric History:     Family History   Problem Relation Age of Onset    Depression Maternal Grandmother     Bipolar disorder Paternal Aunt        History Review:  The following portions of the patient's history were reviewed and updated as appropriate: allergies, current medications, past family history, past medical history, past social history, past surgical history and problem list          OBJECTIVE:     Vital signs in last 24 hours:    Vitals:    11/14/19 1541   BP: 135/84   Pulse: 89   Weight: 75 3 kg (166 lb)   Height: 5' 3" (1 6 m)       Mental Status Evaluation:    Appearance age appropriate, casually dressed   Behavior cooperative, appears anxious   Speech normal rate, soft   Mood depressed, anxious   Affect blunted   Thought Processes organized, goal directed   Associations intact associations   Thought Content no overt delusions   Perceptual Disturbances: no auditory hallucinations, no visual hallucinations   Abnormal Thoughts  Risk Potential Suicidal ideation - None  Homicidal ideation - None  Potential for aggression - No   Orientation oriented to person, place, time/date and situation   Memory recent and remote memory grossly intact   Consciousness alert and awake   Attention Span Concentration Span decreased attention span  decreased concentration   Intellect appears to be of average intelligence   Insight intact   Judgement intact   Muscle Strength and  Gait normal muscle strength and normal muscle tone, normal gait and normal balance   Motor activity no abnormal movements   Language no difficulty naming common objects, no difficulty repeating a phrase, no difficulty writing a sentence   Fund of Knowledge adequate knowledge of current events  adequate fund of knowledge regarding past history  adequate fund of knowledge regarding vocabulary    Pain none   Pain Scale 0       Laboratory Results: I have personally reviewed all pertinent laboratory/tests results  Recent Labs (last 4 months):   Appointment on 07/18/2019   Component Date Value    WBC 07/18/2019 4 86     RBC 07/18/2019 4 94     Hemoglobin 07/18/2019 13 1     Hematocrit 07/18/2019 42 3     MCV 07/18/2019 86     MCH 07/18/2019 26 5*    MCHC 07/18/2019 31 0*    RDW 07/18/2019 14 7     MPV 07/18/2019 9 2     Platelets 82/21/4750 262     nRBC 07/18/2019 0     Neutrophils Relative 07/18/2019 59     Immat GRANS % 07/18/2019 0     Lymphocytes Relative 07/18/2019 32     Monocytes Relative 07/18/2019 7     Eosinophils Relative 07/18/2019 1     Basophils Relative 07/18/2019 1     Neutrophils Absolute 07/18/2019 2 82     Immature Grans Absolute 07/18/2019 0 01     Lymphocytes Absolute 07/18/2019 1 57     Monocytes Absolute 07/18/2019 0 36     Eosinophils Absolute 07/18/2019 0 07     Basophils Absolute 07/18/2019 0 03     Cholesterol 07/18/2019 228*    Triglycerides 07/18/2019 49     HDL, Direct 07/18/2019 66*    LDL Calculated 07/18/2019 152*    Non-HDL-Chol (CHOL-HDL) 07/18/2019 162     TSH 3RD GENERATON 07/18/2019 1  251     Sodium 07/18/2019 141     Potassium 07/18/2019 4 1     Chloride 07/18/2019 103     CO2 07/18/2019 33*    ANION GAP 07/18/2019 5     BUN 07/18/2019 15     Creatinine 07/18/2019 1 02     Glucose, Fasting 07/18/2019 87     Calcium 07/18/2019 9 5     AST 07/18/2019 15     ALT 07/18/2019 23     Alkaline Phosphatase 07/18/2019 81     Total Protein 07/18/2019 7 7     Albumin 07/18/2019 3 9     Total Bilirubin 07/18/2019 0 40     eGFR 07/18/2019 70     Vit D, 25-Hydroxy 07/18/2019 22 3*       Suicide/Homicide Risk Assessment:    Risk of Harm to Self:  Demographic risk factors include: age: over 48 or older  Historical Risk Factors include: chronic depression, chronic anxiety symptoms  Recent Specific Risk Factors include: diagnosis of depression, current depressive symptoms, current anxiety symptoms, hopelessness  Protective Factors: no current suicidal ideation, being a parent, being , compliant with medications, compliant with mental health treatment, responsibilities and duties to others, supportive   Weapons: none  The following steps have been taken to ensure weapons are properly secured: not applicable  Based on today's assessment, Jerrod Eloisa presents the following risk of harm to self: minimal    Risk of Harm to Others:  Based on today's assessment, Jerrod Amin presents the following risk of harm to others: none    The following interventions are recommended: no intervention changes needed    Assessment/Plan:       Diagnoses and all orders for this visit:    Major depressive disorder, recurrent severe without psychotic features ([facility] )  -     PARoxetine (PAXIL) 40 MG tablet; Take 1 tablet (40 mg total) by mouth every evening  -     ARIPiprazole (ABILIFY) 5 mg tablet; Take 1 tablet (5 mg total) by mouth every evening    JOSE ROBERTO (generalized anxiety disorder)  -     ALPRAZolam (XANAX) 0 5 mg tablet; Take 1 tablet (0 5 mg total) by mouth 3 (three) times a day as needed for anxiety To be filled on or after 11/29/19  -     PARoxetine (PAXIL) 40 MG tablet;  Take 1 tablet (40 mg total) by mouth every evening          Treatment Recommendations/Precautions:    Continue Paxil 40 mg in the evening to improve depressive symptoms  Continue Xanax 0 5 mg tid PRN to improve anxiety symptoms   Continue Abilify 5 mg in the evening to help with depressive symptoms and augment an antidepressant  She does not want any medication changes at present - but would like to consider [facility] [place]  Will send referral to Dr Kike Bennett for review  Medication management every 2 months  She no longer sees therapist and does not want to restart therapy  Follows with family physician for glucose and lipid monitoring due to current therapy with antipsychotic medication  Follows with family physician for medical issues  Aware of 24 hour and weekend coverage for urgent situations accessed by calling [facility] main practice number    Medications Risks/Benefits      Risks, Benefits And Possible Side Effects Of Medications:    Risks, benefits, and possible side effects of medications explained to [facility] including risk of parkinsonian symptoms, Tardive Dyskinesia and metabolic syndrome related to treatment with antipsychotic medications, risk of suicidality and serotonin syndrome related to treatment with antidepressants and risks of misuse, abuse or dependence, sedation and respiratory depression related to treatment with benzodiazepine medications  She verbalizes understanding and agreement for treatment  Controlled Medication Discussion:     [facility] has been filling controlled prescriptions on time as prescribed according to [address]  Discussed with [facility] the risks of sedation, respiratory depression, impairment of ability to drive and potential for abuse and addiction related to treatment with benzodiazepine medications  She understands risk of treatment with benzodiazepine medications, agrees to not drive if feels impaired and agrees to take medications as prescribed    Psychotherapy Provided:     Individual psychotherapy provided: Yes  Counseling was provided during the session today for 16 minutes    Medications, treatment progress and treatment plan reviewed with Samuel Singleton  Medication education provided to Samuel Singleton  Goals discussed during in session: lessen anxiety and improve control of depression  Discussed with Samuel Singleton coping with ongoing anxiety, chronic mental illness and raising autistic adoptive son  Coping techniques including Shinto involvement, exercising, doreen and spending time with family reviewed with Samuel Singleton  Supportive therapy provided        Treatment Plan;    Completed and signed during the session: Yes - with Debora Mcardle, MD 11/14/19

## 2019-11-14 ENCOUNTER — OFFICE VISIT (OUTPATIENT)
Dept: PSYCHIATRY | Facility: CLINIC | Age: 59
End: 2019-11-14
Payer: COMMERCIAL

## 2019-11-14 VITALS
SYSTOLIC BLOOD PRESSURE: 135 MMHG | BODY MASS INDEX: 29.41 KG/M2 | HEIGHT: 63 IN | DIASTOLIC BLOOD PRESSURE: 84 MMHG | WEIGHT: 166 LBS | HEART RATE: 89 BPM

## 2019-11-14 DIAGNOSIS — F33.2 MAJOR DEPRESSIVE DISORDER, RECURRENT SEVERE WITHOUT PSYCHOTIC FEATURES (HCC): Primary | Chronic | ICD-10-CM

## 2019-11-14 DIAGNOSIS — F41.1 GAD (GENERALIZED ANXIETY DISORDER): Chronic | ICD-10-CM

## 2019-11-14 PROCEDURE — 99213 OFFICE O/P EST LOW 20 MIN: CPT | Performed by: PSYCHIATRY & NEUROLOGY

## 2019-11-14 PROCEDURE — 90833 PSYTX W PT W E/M 30 MIN: CPT | Performed by: PSYCHIATRY & NEUROLOGY

## 2019-11-14 RX ORDER — ARIPIPRAZOLE 5 MG/1
5 TABLET ORAL EVERY EVENING
Qty: 30 TABLET | Refills: 3 | Status: SHIPPED | OUTPATIENT
Start: 2019-11-14 | End: 2020-06-22 | Stop reason: SDUPTHER

## 2019-11-14 RX ORDER — ALPRAZOLAM 0.5 MG/1
0.5 TABLET ORAL 3 TIMES DAILY PRN
Qty: 90 TABLET | Refills: 3 | Status: SHIPPED | OUTPATIENT
Start: 2019-11-29 | End: 2020-06-22 | Stop reason: SDUPTHER

## 2019-11-14 RX ORDER — PAROXETINE HYDROCHLORIDE 40 MG/1
40 TABLET, FILM COATED ORAL EVERY EVENING
Qty: 30 TABLET | Refills: 3 | Status: SHIPPED | OUTPATIENT
Start: 2019-11-14 | End: 2020-06-22 | Stop reason: SDUPTHER

## 2019-11-14 NOTE — BH TREATMENT PLAN
TREATMENT PLAN (Medication Management Only)        [facility]    Name/Date of Birth/MRN:  Lee Terry 61 y o  1960 MRN: [id number]  Date of Treatment Plan: November 14, 2019  Diagnosis/Diagnoses:   1  Major depressive disorder, recurrent severe without psychotic features ([facility] )    2  JOSE ROBERTO (generalized anxiety disorder)      Strengths/Personal Resources for Self-Care: "caring, kind"  Area/Areas of need (in own words): "down on self"  1  Long Term Goal:   "to wake up each morning and live life to fullest"  Target Date: 2 months - 1/14/2020  Person/Persons responsible for completion of goal: Quinn Ahuja  2  Short Term Objective (s) - How will we reach this goal?:   A  Provider new recommended medication/dosage changes and/or continue medication(s): continue current medications as prescribed (Paxil, Abilify and Xanax), consider [facility]  B   "begin-set a plan in place and stick to it"  C   N/A  Target Date: 2 months - 1/14/2020  Person/Persons Responsible for Completion of Goal: Quinn Ahuja   Progress Towards Goals: limited progress  Treatment Modality: medication management every 2 months, referral for [facility]  Review due 180 days from date of this plan: 6 months - 5/14/2020  Expected length of service: ongoing treatment unless revised  My Physician/PA/NP and I have developed this plan together and I agree to work on the goals and objectives  I understand the treatment goals that were developed for my treatment    Electronic Signatures: on file (unless signed below)    Maikel Jones MD 11/14/19

## 2019-12-11 ENCOUNTER — DOCUMENTATION (OUTPATIENT)
Dept: PSYCHIATRY | Facility: CLINIC | Age: 59
End: 2019-12-11

## 2020-01-09 ENCOUNTER — TELEPHONE (OUTPATIENT)
Dept: BEHAVIORAL/MENTAL HEALTH CLINIC | Facility: CLINIC | Age: 60
End: 2020-01-09

## 2020-01-09 NOTE — TELEPHONE ENCOUNTER
I called Donis Moncada today  1/9 requesting she come in to sign a Release form for [employer] for insurance applications and or claims investigation for [employer] [facility] [employer] She stated she would try to come in today  Ramy Lopez

## 2020-01-21 ENCOUNTER — TELEPHONE (OUTPATIENT)
Dept: BEHAVIORAL/MENTAL HEALTH CLINIC | Facility: CLINIC | Age: 60
End: 2020-01-21

## 2020-01-21 NOTE — TELEPHONE ENCOUNTER
Received paperwork dated November, 2019 for Alejandro Diaz from [facility] requesting medical records for the past 3 years  Dr Abhilash Turner left note stating that Alejandro Diaz would need to come in to sign our Release of Information form  I called and spoke to Alejandro Diaz on 11/15 and informed her of the need for LIANET  She said she would come in to sign it  I called Alejandro Diaz again on 1/9/20 reminding her that this paperwork could not be processed without an LIANET and she again said she would try to come in that day

## 2020-02-05 ENCOUNTER — TELEPHONE (OUTPATIENT)
Dept: BEHAVIORAL/MENTAL HEALTH CLINIC | Facility: CLINIC | Age: 60
End: 2020-02-05

## 2020-02-05 NOTE — TELEPHONE ENCOUNTER
Alejandro Diaz stopped in with a medical records request for [address] [place] [address]  She signed an LIANET at this time  I put all paperwork in Dr Sandra Mclaughlin

## 2020-02-10 ENCOUNTER — TELEPHONE (OUTPATIENT)
Dept: BEHAVIORAL/MENTAL HEALTH CLINIC | Facility: CLINIC | Age: 60
End: 2020-02-10

## 2020-02-10 NOTE — TELEPHONE ENCOUNTER
Medical records request [facility]) sent by [facility] representative

## 2020-03-04 DIAGNOSIS — F41.1 GAD (GENERALIZED ANXIETY DISORDER): Chronic | ICD-10-CM

## 2020-03-04 DIAGNOSIS — F33.2 MAJOR DEPRESSIVE DISORDER, RECURRENT SEVERE WITHOUT PSYCHOTIC FEATURES (HCC): Chronic | ICD-10-CM

## 2020-03-04 RX ORDER — PAROXETINE HYDROCHLORIDE 40 MG/1
TABLET, FILM COATED ORAL
Qty: 90 TABLET | Refills: 4 | OUTPATIENT
Start: 2020-03-04

## 2020-05-12 DIAGNOSIS — F33.2 MAJOR DEPRESSIVE DISORDER, RECURRENT SEVERE WITHOUT PSYCHOTIC FEATURES (HCC): Chronic | ICD-10-CM

## 2020-05-12 RX ORDER — ARIPIPRAZOLE 5 MG/1
TABLET ORAL
Qty: 30 TABLET | Refills: 3 | OUTPATIENT
Start: 2020-05-12

## 2020-06-04 DIAGNOSIS — F33.2 MAJOR DEPRESSIVE DISORDER, RECURRENT SEVERE WITHOUT PSYCHOTIC FEATURES (HCC): Chronic | ICD-10-CM

## 2020-06-04 RX ORDER — ARIPIPRAZOLE 5 MG/1
TABLET ORAL
Qty: 30 TABLET | Refills: 3 | OUTPATIENT
Start: 2020-06-04

## 2020-06-22 DIAGNOSIS — F41.1 GAD (GENERALIZED ANXIETY DISORDER): Chronic | ICD-10-CM

## 2020-06-22 DIAGNOSIS — F33.2 MAJOR DEPRESSIVE DISORDER, RECURRENT SEVERE WITHOUT PSYCHOTIC FEATURES (HCC): Chronic | ICD-10-CM

## 2020-06-24 RX ORDER — ARIPIPRAZOLE 5 MG/1
5 TABLET ORAL EVERY EVENING
Qty: 30 TABLET | Refills: 0 | Status: SHIPPED | OUTPATIENT
Start: 2020-06-24 | End: 2020-07-22

## 2020-06-24 RX ORDER — ALPRAZOLAM 0.5 MG/1
0.5 TABLET ORAL 3 TIMES DAILY PRN
Qty: 90 TABLET | Refills: 0 | Status: SHIPPED | OUTPATIENT
Start: 2020-06-24 | End: 2020-09-08 | Stop reason: SDUPTHER

## 2020-06-24 RX ORDER — PAROXETINE HYDROCHLORIDE 40 MG/1
40 TABLET, FILM COATED ORAL EVERY EVENING
Qty: 30 TABLET | Refills: 0 | Status: SHIPPED | OUTPATIENT
Start: 2020-06-24 | End: 2020-09-08 | Stop reason: SDUPTHER

## 2020-07-22 DIAGNOSIS — F33.2 MAJOR DEPRESSIVE DISORDER, RECURRENT SEVERE WITHOUT PSYCHOTIC FEATURES (HCC): Chronic | ICD-10-CM

## 2020-07-22 RX ORDER — ARIPIPRAZOLE 5 MG/1
TABLET ORAL
Qty: 30 TABLET | Refills: 0 | Status: SHIPPED | OUTPATIENT
Start: 2020-07-22 | End: 2020-09-03

## 2020-09-01 ENCOUNTER — DOCUMENTATION (OUTPATIENT)
Dept: PSYCHIATRY | Facility: CLINIC | Age: 60
End: 2020-09-01

## 2020-09-03 DIAGNOSIS — F33.2 MAJOR DEPRESSIVE DISORDER, RECURRENT SEVERE WITHOUT PSYCHOTIC FEATURES (HCC): Primary | Chronic | ICD-10-CM

## 2020-09-03 RX ORDER — ARIPIPRAZOLE 5 MG/1
5 TABLET ORAL EVERY EVENING
Qty: 30 TABLET | Refills: 0 | Status: SHIPPED | OUTPATIENT
Start: 2020-09-03 | End: 2020-09-08 | Stop reason: SDUPTHER

## 2020-09-04 NOTE — PSYCH
Virtual Regular Visit      Assessment/Plan:    Problem List Items Addressed This Visit        Other    Major depressive disorder, recurrent severe without psychotic features (HCC) - Primary (Chronic)    Relevant Medications    ALPRAZolam (XANAX) 0 5 mg tablet    PARoxetine (PAXIL) 40 MG tablet    ARIPiprazole (ABILIFY) 5 mg tablet    JOSE ROBERTO (generalized anxiety disorder) (Chronic)    Relevant Medications    ALPRAZolam (XANAX) 0 5 mg tablet    PARoxetine (PAXIL) 40 MG tablet    ARIPiprazole (ABILIFY) 5 mg tablet          Reason for visit is   Chief Complaint   Patient presents with    Medication Management    Follow-up    Virtual Regular Visit        Encounter provider Gina Holman MD    Provider located at [address] [place] [address] [address]  [facility] [id number]    Recent Visits  No visits were found meeting these conditions  Showing recent visits within past 7 days and meeting all other requirements     Today's Visits  Date Type Provider Dept   09/08/20 Telemedicine Elieser Ramirez MD [facility] 18 today's visits and meeting all other requirements     Future Appointments  No visits were found meeting these conditions  Showing future appointments within next 150 days and meeting all other requirements      It was my intent to perform this visit via video technology but the patient was not able to do a video connection so the visit was completed via audio telephone only  The patient was identified by name and date of birth  Nupur Saleem was informed that this is a telemedicine visit and that the visit is being conducted through telephone  My office door was closed  No one else was in the room  She acknowledged consent and understanding of privacy and security of the telephone platform  The patient has agreed to participate and understands they can discontinue the visit at any time      Patient is aware this is a billable service  SUBJECTIVE:    Dominguez Davis is seen today for a follow up for Major Depressive Disorder and Generalized Anxiety Disorder  She has been experiencing ongoing symptoms since the last visit  She continues to feel depressed, sometimes also feels hopeless and overwhelmed  She still has significant anxiety symptoms  She has been stressed by family issues "dealing with my sister who is a hoarder"  She also worries frequently about her son "how his life is going to look like, that he is happy and has a place to live"  She states that praying and her doreen help her coping with those issues  She denies any suicidal ideation, intent or plan at present; denies any homicidal ideation, intent or plan at present  She has no auditory hallucinations, denies any visual hallucinations, has no delusional thinking  She denies any side effects from current psychiatric medications  HPI ROS Appetite Changes and Sleep:     She reports fluctuating sleep pattern, decrease in number of sleep hours (6 hours), normal appetite, recent weight loss (6 lbs), low energy    Current Rating Scores:     Current PHQ-9   PHQ-9 Score (since 8/8/2020)     PHQ-9 Score  13        Current PHQ-9 score is decreased from 17 at the last visit)  Review Of Systems:      Constitutional low energy and recent weight loss (6 lbs)   ENT negative   Cardiovascular negative   Respiratory negative   Gastrointestinal negative   Genitourinary negative   Musculoskeletal negative   Integumentary negative   Neurological negative   Endocrine negative   Other Symptoms none, all other systems are negative       Past Psychiatric History: (unchanged information from previous note copied and updated)     No history of past inpatient psychiatric admissions  In outpatient treatment at [address] [place] [address] E for many years    Past Suicide Attempts: no  Past Violent Behavior: no  Past Psychiatric Medication Trials: Paxil, Prozac, Zoloft, Effexor, Wellbutrin and Xanax    Traumatic History: (unchanged information from previous note copied and updated)    Abuse: no history of physical or sexual abuse  Other Traumatic Events: none     Past Medical History:    Past Medical History:   Diagnosis Date    Arthritis     Asthma     Atelectasis     Atopic rhinitis     Eczema     Hyperlipidemia     Low back pain     MVP (mitral valve prolapse)     Palpitations     Subconjunctival hemorrhage     Vitamin D deficiency      Past Medical History Pertinent Negatives:   Diagnosis Date Noted    Cancer ([facility] ) 05/02/2018    Head injury     Seizures ([facility] )      Past Surgical History:   Procedure Laterality Date    NO PAST SURGERIES       No Known Allergies    Substance Abuse History:    Social History     Substance and Sexual Activity   Alcohol Use Yes    Frequency: Monthly or less    Drinks per session: 1 or 2    Binge frequency: Never    Comment: Social alcohol use     Social History     Substance and Sexual Activity   Drug Use No       Social History:    Social History     Socioeconomic History    Marital status: /Civil Union     Spouse name: Not on file    Number of children: 1    Years of education: bachelor's degree     Highest education level: Bachelor's degree (e g , BA, AB, BS)   Occupational History    Occupation: On disability; worked in office management   Social Needs    Financial resource strain: Not on file    Food insecurity     Worry: Never true     Inability: Never true    Transportation needs     Medical: No     Non-medical: No   Tobacco Use    Smoking status: Never Smoker    Smokeless tobacco: Never Used   Substance and Sexual Activity    Alcohol use: Yes     Frequency: Monthly or less     Drinks per session: 1 or 2     Binge frequency: Never     Comment: Social alcohol use    Drug use: No    Sexual activity: Yes     Partners: Male   Lifestyle    Physical activity     Days per week: 4 days     Minutes per session: 30 min    Stress:  To some extent   Relationships    Social connections     Talks on phone: Never     Gets together: Never     Attends Worship service: More than 4 times per year     Active member of club or organization: No     Attends meetings of clubs or organizations: Never     Relationship status:     Intimate partner violence     Fear of current or ex partner: No     Emotionally abused: No     Physically abused: No     Forced sexual activity: No   Other Topics Concern    Not on file   Social History Narrative    Education: bachelor's degree in communications    Learning Disabilities: none    Marital History:     Children: 1 adoptive son     Living Arrangement: lives in home with  and son    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, on permanent disability    Functioning Relationships:  is supportive    Legal History: none     History: None       Family Psychiatric History:     Family History   Problem Relation Age of Onset    Depression Maternal Grandmother     Bipolar disorder Paternal Aunt     Substance Abuse Neg Hx     Suicide Attempts Neg Hx        History Review:  The following portions of the patient's history were reviewed and updated as appropriate: allergies, current medications, past family history, past medical history, past social history, past surgical history and problem list          OBJECTIVE:     Vital signs in last 24 hours:    Vitals:    09/08/20 1441   Weight: 72 6 kg (160 lb)   Height: 5' 3" (1 6 m)       Mental Status Evaluation:    Appearance unable to assess today due to virtual visit   Behavior cooperative, unable to assess further due to virtual visit   Speech normal rate, normal volume, normal pitch   Mood depressed, anxious   Affect unable to assess today due to virtual visit   Thought Processes organized, goal directed   Associations intact associations   Thought Content no overt delusions   Perceptual Disturbances: no auditory hallucinations, no visual hallucinations   Abnormal Thoughts  Risk Potential Suicidal ideation - None  Homicidal ideation - None  Potential for aggression - No   Orientation oriented to person, place, time/date and situation   Memory recent and remote memory grossly intact   Consciousness alert and awake   Attention Span Concentration Span decreased attention span  decreased concentration   Intellect appears to be of average intelligence   Insight intact   Judgement intact   Muscle Strength and  Gait unable to assess today due to virtual visit   Motor activity unable to assess today due to virtual visit   Language no difficulty naming common objects, no difficulty repeating a phrase, unable to assess writing today due to virtual visit   Fund of Knowledge adequate knowledge of current events  adequate fund of knowledge regarding past history  adequate fund of knowledge regarding vocabulary    Pain none   Pain Scale 0       Laboratory Results: I have personally reviewed all pertinent laboratory/tests results    Recent Labs (last 4 months):   No visits with results within 4 Month(s) from this visit     Latest known visit with results is:   Appointment on 07/18/2019   Component Date Value    WBC 07/18/2019 4 86     RBC 07/18/2019 4 94     Hemoglobin 07/18/2019 13 1     Hematocrit 07/18/2019 42 3     MCV 07/18/2019 86     MCH 07/18/2019 26 5*    MCHC 07/18/2019 31 0*    RDW 07/18/2019 14 7     MPV 07/18/2019 9 2     Platelets 37/95/2384 262     nRBC 07/18/2019 0     Neutrophils Relative 07/18/2019 59     Immat GRANS % 07/18/2019 0     Lymphocytes Relative 07/18/2019 32     Monocytes Relative 07/18/2019 7     Eosinophils Relative 07/18/2019 1     Basophils Relative 07/18/2019 1     Neutrophils Absolute 07/18/2019 2 82     Immature Grans Absolute 07/18/2019 0 01     Lymphocytes Absolute 07/18/2019 1 57     Monocytes Absolute 07/18/2019 0 36     Eosinophils Absolute 07/18/2019 0 07     Basophils Absolute 07/18/2019 0 03     Cholesterol 07/18/2019 228*    Triglycerides 07/18/2019 49     HDL, Direct 07/18/2019 66*    LDL Calculated 07/18/2019 152*    Non-HDL-Chol (CHOL-HDL) 07/18/2019 162     TSH 3RD GENERATON 07/18/2019 1  251     Sodium 07/18/2019 141     Potassium 07/18/2019 4 1     Chloride 07/18/2019 103     CO2 07/18/2019 33*    ANION GAP 07/18/2019 5     BUN 07/18/2019 15     Creatinine 07/18/2019 1 02     Glucose, Fasting 07/18/2019 87     Calcium 07/18/2019 9 5     AST 07/18/2019 15     ALT 07/18/2019 23     Alkaline Phosphatase 07/18/2019 81     Total Protein 07/18/2019 7 7     Albumin 07/18/2019 3 9     Total Bilirubin 07/18/2019 0 40     eGFR 07/18/2019 70     Vit D, 25-Hydroxy 07/18/2019 22 3*       Suicide/Homicide Risk Assessment:    Risk of Harm to Self:  Demographic risk factors include: age: over 48 or older  Historical Risk Factors include: chronic depressive symptoms, chronic anxiety symptoms  Recent Specific Risk Factors include: diagnosis of depression, current depressive symptoms, current anxiety symptoms  Protective Factors: no current suicidal ideation, being a parent, being , compliant with medications, compliant with mental health treatment, connection to own children, responsibilities and duties to others, stable living environment, supportive family  Weapons: none  The following steps have been taken to ensure weapons are properly secured: not applicable  Based on today's assessment, Greg Alvarado presents the following risk of harm to self: minimal    Risk of Harm to Others:   The following ratings are based on assessment at the time of the interview  Based on today's assessment, Greg Alvarado presents the following risk of harm to others: none    The following interventions are recommended: no intervention changes needed    Assessment/Plan:       Diagnoses and all orders for this visit:    Major depressive disorder, recurrent severe without psychotic features ([facility] )  -     PARoxetine (PAXIL) 40 MG tablet; Take 1 tablet (40 mg total) by mouth every evening  -     ARIPiprazole (ABILIFY) 5 mg tablet; Take 1 tablet (5 mg total) by mouth every evening    JOSE ROBERTO (generalized anxiety disorder)  -     ALPRAZolam (XANAX) 0 5 mg tablet; Take 1 tablet (0 5 mg total) by mouth 3 (three) times a day as needed for anxiety  -     PARoxetine (PAXIL) 40 MG tablet; Take 1 tablet (40 mg total) by mouth every evening          Treatment Recommendations/Precautions:    Continue Paxil 40 mg every evening to improve depressive symptoms  Continue Xanax 0 5 mg tid PRN to improve anxiety symptoms  Continue Abilify 5 mg every evening to augment antidepressant  Medication management every 3 months  Does not want to see a therapist  Follows with family physician for glucose and lipid monitoring due to current therapy with antipsychotic medication  Follows with family physician for mitral valve prolapse, asthma, arthritis and hyperlipidemia  Aware of 24 hour and weekend coverage for urgent situations accessed by calling [facility] main practice number  Does not want any medication changes    Medications Risks/Benefits      Risks, Benefits And Possible Side Effects Of Medications:    Risks, benefits, and possible side effects of medications explained to Tamar Otero including risk of parkinsonian symptoms, Tardive Dyskinesia and metabolic syndrome related to treatment with antipsychotic medications, risk of suicidality and serotonin syndrome related to treatment with antidepressants and risks of misuse, abuse or dependence, sedation and respiratory depression related to treatment with benzodiazepine medications  She verbalizes understanding and agreement for treatment      Controlled Medication Discussion:     Tamar Otero has been filling controlled prescriptions on time as prescribed according to [address]  Discussed with Tamar Otero the risks of sedation, respiratory depression, impairment of ability to drive and potential for abuse and addiction related to treatment with benzodiazepine medications  She understands risk of treatment with benzodiazepine medications, agrees to not drive if feels impaired and agrees to take medications as prescribed    Psychotherapy Provided:     Individual psychotherapy provided: Yes  Counseling was provided during the session today for 16 minutes  Medications, treatment progress and treatment plan reviewed with [facility]  Goals discussed during in session: alleviate anxiety and improve control of depression  Discussed with [facility] coping with family issues and son's illness  Coping strategies including doreen, prayer and prioritize important tasks reviewed with [facility]  Supportive therapy provided  Treatment Plan:    Completed and signed during the session: Yes - Treatment Plan done but not signed at time of office visit due to:  Plan reviewed by phone and verbal consent given due to [address] distancing     As a result of this visit, I have not referred the patient for further respiratory evaluation  I spent 30 minutes with patient today in which greater than 50% of the time was spent in counseling/coordination of care regarding coping with family issues and son's illness (autism)      VIRTUAL VISIT DISCLAIMER    Rodrigo Schneider acknowledges that she has consented to an online visit or consultation  She understands that the online visit is based solely on information provided by her, and that, in the absence of a face-to-face physical evaluation by the physician, the diagnosis she receives is both limited and provisional in terms of accuracy and completeness  This is not intended to replace a full medical face-to-face evaluation by the physician  Rodrigo Schneider understands and accepts these terms        Martita Phan MD 09/08/20

## 2020-09-08 ENCOUNTER — TELEMEDICINE (OUTPATIENT)
Dept: PSYCHIATRY | Facility: CLINIC | Age: 60
End: 2020-09-08
Payer: COMMERCIAL

## 2020-09-08 VITALS — WEIGHT: 160 LBS | BODY MASS INDEX: 28.35 KG/M2 | HEIGHT: 63 IN

## 2020-09-08 DIAGNOSIS — F33.2 MAJOR DEPRESSIVE DISORDER, RECURRENT SEVERE WITHOUT PSYCHOTIC FEATURES (HCC): Primary | Chronic | ICD-10-CM

## 2020-09-08 DIAGNOSIS — F41.1 GAD (GENERALIZED ANXIETY DISORDER): Chronic | ICD-10-CM

## 2020-09-08 PROCEDURE — 90833 PSYTX W PT W E/M 30 MIN: CPT | Performed by: PSYCHIATRY & NEUROLOGY

## 2020-09-08 PROCEDURE — 99213 OFFICE O/P EST LOW 20 MIN: CPT | Performed by: PSYCHIATRY & NEUROLOGY

## 2020-09-08 RX ORDER — ALPRAZOLAM 0.5 MG/1
0.5 TABLET ORAL 3 TIMES DAILY PRN
Qty: 90 TABLET | Refills: 4 | Status: SHIPPED | OUTPATIENT
Start: 2020-09-08 | End: 2020-12-11 | Stop reason: SDUPTHER

## 2020-09-08 RX ORDER — ARIPIPRAZOLE 5 MG/1
5 TABLET ORAL EVERY EVENING
Qty: 30 TABLET | Refills: 4 | Status: SHIPPED | OUTPATIENT
Start: 2020-09-08 | End: 2020-12-11 | Stop reason: SDUPTHER

## 2020-09-08 RX ORDER — PAROXETINE HYDROCHLORIDE 40 MG/1
40 TABLET, FILM COATED ORAL EVERY EVENING
Qty: 30 TABLET | Refills: 4 | Status: SHIPPED | OUTPATIENT
Start: 2020-09-08 | End: 2020-12-11 | Stop reason: SDUPTHER

## 2020-09-08 NOTE — BH TREATMENT PLAN
TREATMENT PLAN (Medication Management Only)        [facility]    Name/Date of Birth/MRN:  Laurie King 61 y o  1960 MRN: [id number]  Date of Treatment Plan: September 8, 2020  Diagnosis/Diagnoses:   1  Major depressive disorder, recurrent severe without psychotic features ([facility] [address] [facility] )    2  JOSE ROBERTO (generalized anxiety disorder)      Strengths/Personal Resources for Self-Care: "empathy for others, self-talk to encourage myself, my doreen"  Area/Areas of need (in own words): "following through, completing tasks, organizing"  1  Long Term Goal:   alleviate anxiety, help with depression  Target Date: 3 months - 12/8/2020  Person/Persons responsible for completion of goal: Golden Jessica  2  Short Term Objective (s) - How will we reach this goal?:   A  Provider new recommended medication/dosage changes and/or continue medication(s): continue current medications as prescribed (Paxil, Abilify and Xanax)  B   N/A   C   N/A  Target Date: 3 months - 12/8/2020  Person/Persons Responsible for Completion of Goal: Golden Jessica   Progress Towards Goals: limited progress  Treatment Modality: medication management every 3 months  Review due 180 days from date of this plan: 6 months - 3/8/2021  Expected length of service: ongoing treatment unless revised  My Physician/PA/NP and I have developed this plan together and I agree to work on the goals and objectives  I understand the treatment goals that were developed for my treatment    Electronic Signatures: on file (unless signed below)    Stacy Michaels MD 09/08/20

## 2020-10-05 ENCOUNTER — OFFICE VISIT (OUTPATIENT)
Dept: URGENT CARE | Age: 60
End: 2020-10-05
Payer: COMMERCIAL

## 2020-10-05 VITALS — RESPIRATION RATE: 18 BRPM | TEMPERATURE: 98.6 F | HEART RATE: 78 BPM | OXYGEN SATURATION: 100 %

## 2020-10-05 DIAGNOSIS — R05.9 COUGH: Primary | ICD-10-CM

## 2020-10-05 DIAGNOSIS — R53.83 FATIGUE, UNSPECIFIED TYPE: ICD-10-CM

## 2020-10-05 DIAGNOSIS — R42 DIZZINESS: ICD-10-CM

## 2020-10-05 PROCEDURE — U0003 INFECTIOUS AGENT DETECTION BY NUCLEIC ACID (DNA OR RNA); SEVERE ACUTE RESPIRATORY SYNDROME CORONAVIRUS 2 (SARS-COV-2) (CORONAVIRUS DISEASE [COVID-19]), AMPLIFIED PROBE TECHNIQUE, MAKING USE OF HIGH THROUGHPUT TECHNOLOGIES AS DESCRIBED BY CMS-2020-01-R: HCPCS | Performed by: PHYSICIAN ASSISTANT

## 2020-10-05 PROCEDURE — 99212 OFFICE O/P EST SF 10 MIN: CPT | Performed by: PHYSICIAN ASSISTANT

## 2020-10-07 LAB — SARS-COV-2 RNA SPEC QL NAA+PROBE: NOT DETECTED

## 2020-10-08 ENCOUNTER — TELEPHONE (OUTPATIENT)
Dept: URGENT CARE | Age: 60
End: 2020-10-08

## 2020-12-11 ENCOUNTER — TELEMEDICINE (OUTPATIENT)
Dept: PSYCHIATRY | Facility: CLINIC | Age: 60
End: 2020-12-11
Payer: COMMERCIAL

## 2020-12-11 VITALS — HEIGHT: 63 IN | BODY MASS INDEX: 28.35 KG/M2 | WEIGHT: 160 LBS

## 2020-12-11 DIAGNOSIS — F33.2 MAJOR DEPRESSIVE DISORDER, RECURRENT SEVERE WITHOUT PSYCHOTIC FEATURES (HCC): Primary | Chronic | ICD-10-CM

## 2020-12-11 DIAGNOSIS — Z79.899 LONG-TERM USE OF HIGH-RISK MEDICATION: Chronic | ICD-10-CM

## 2020-12-11 DIAGNOSIS — F41.1 GAD (GENERALIZED ANXIETY DISORDER): Chronic | ICD-10-CM

## 2020-12-11 PROCEDURE — 99214 OFFICE O/P EST MOD 30 MIN: CPT | Performed by: PSYCHIATRY & NEUROLOGY

## 2020-12-11 PROCEDURE — 90833 PSYTX W PT W E/M 30 MIN: CPT | Performed by: PSYCHIATRY & NEUROLOGY

## 2020-12-11 RX ORDER — ARIPIPRAZOLE 5 MG/1
5 TABLET ORAL EVERY EVENING
Qty: 30 TABLET | Refills: 4 | Status: SHIPPED | OUTPATIENT
Start: 2020-12-11 | End: 2021-03-12 | Stop reason: SDUPTHER

## 2020-12-11 RX ORDER — PAROXETINE HYDROCHLORIDE 40 MG/1
40 TABLET, FILM COATED ORAL EVERY EVENING
Qty: 30 TABLET | Refills: 4 | Status: SHIPPED | OUTPATIENT
Start: 2020-12-11 | End: 2021-03-12 | Stop reason: SDUPTHER

## 2020-12-11 RX ORDER — ALPRAZOLAM 0.5 MG/1
0.5 TABLET ORAL 3 TIMES DAILY PRN
Qty: 90 TABLET | Refills: 3 | Status: SHIPPED | OUTPATIENT
Start: 2021-02-04 | End: 2021-06-30 | Stop reason: SDUPTHER

## 2021-03-02 NOTE — PSYCH
Virtual Regular Visit      Assessment/Plan:    Problem List Items Addressed This Visit        Other    Major depressive disorder, recurrent severe without psychotic features ([facility] [address] ) - Primary (Chronic)    Relevant Medications    PARoxetine (PAXIL) 40 MG tablet    ARIPiprazole (ABILIFY) 5 mg tablet    JOSE ROBERTO (generalized anxiety disorder) (Chronic)    Relevant Medications    PARoxetine (PAXIL) 40 MG tablet    ARIPiprazole (ABILIFY) 5 mg tablet    Long-term use of high-risk medication (Chronic)          Reason for visit is   Chief Complaint   Patient presents with    Medication Management    Follow-up    Virtual Regular Visit      Encounter provider Michael Lees MD    Provider located at [address] [address] [phone]    Recent Visits  No visits were found meeting these conditions  Showing recent visits within past 7 days and meeting all other requirements     Today's Visits  Date Type Provider Dept   03/12/21 Telemedicine MD Joel Jolley 18 today's visits and meeting all other requirements     Future Appointments  No visits were found meeting these conditions  Showing future appointments within next 150 days and meeting all other requirements        The patient was identified by name and date of birth  Chuck Catherine was informed that this is a telemedicine visit and that the visit is being conducted through [employer] and patient was informed that this is a secure, HIPAA-compliant platform  She agrees to proceed     My office door was closed  No one else was in the room  She acknowledged consent and understanding of privacy and security of the video platform  The patient has agreed to participate and understands they can discontinue the visit at any time  Patient is aware this is a billable service       Subjective:    Chuck Catherine is a 61 y o  female with a history of Major Depressive Disorder and Generalized Anxiety Disorder  She has improved since the last visit  She reports that depressive symptoms are improved, has more motivation and feels "more organized  Maybe the weatehr is helping"  She continues to experience on and off anxiety symptoms "it depends on the day"  She has been coping fairly well recently with ongoing COVID-19 pandemic and taking care of her son with autism  She denies any suicidal ideation, intent or plan at present; denies any homicidal ideation, intent or plan at present  She has no auditory hallucinations, denies any visual hallucinations, has no delusional thoughts  She denies any side effects from current psychiatric medications  Yanelis Given HPI ROS Appetite Changes and Sleep:     She reports normal sleep, adequate number of sleep hours (7 hours), normal appetite, no weight change, normal energy level    Current Rating Scores:     Current PHQ-9   PHQ-9 Score (since 2/9/2021)     PHQ-9 Score  8        Current PHQ-9 score is decreased from 14 at the last visit)  Review Of Systems:      Constitutional negative   ENT negative   Cardiovascular negative   Respiratory negative   Gastrointestinal negative   Genitourinary negative   Musculoskeletal neck pain   Integumentary negative   Neurological negative   Endocrine negative   Other Symptoms none, all other systems are negative       Past Psychiatric History: (unchanged information from previous note copied and updated)    Past Inpatient Psychiatric Treatment:   No history of past inpatient psychiatric admissions  Past Outpatient Psychiatric Treatment:   In outpatient treatment at [address] [place] [address] E for many years    Past Suicide Attempts: no  Past Violent Behavior: no  Past Psychiatric Medication Trials: Paxil, Prozac, Zoloft, Effexor, Wellbutrin and Xanax    Traumatic History: (unchanged information from previous note copied and updated)    Abuse: no history of physical or sexual abuse  Other Traumatic Events: none     Substance Abuse History:    Social History     Substance and Sexual Activity   Alcohol Use Yes    Frequency: Monthly or less    Drinks per session: 1 or 2    Binge frequency: Never    Comment: Social alcohol use     Social History     Substance and Sexual Activity   Drug Use No       Social History:    Social History     Socioeconomic History    Marital status: /Civil Union     Spouse name: Not on file    Number of children: 1    Years of education: bachelor's degree     Highest education level: Bachelor's degree (e g , BA, AB, BS)   Occupational History    Occupation: On disability; worked in office management   Social Needs    Financial resource strain: Not on file    Food insecurity     Worry: Never true     Inability: Never true    Transportation needs     Medical: No     Non-medical: No   Tobacco Use    Smoking status: Never Smoker    Smokeless tobacco: Never Used   Substance and Sexual Activity    Alcohol use: Yes     Frequency: Monthly or less     Drinks per session: 1 or 2     Binge frequency: Never     Comment: Social alcohol use    Drug use: No    Sexual activity: Yes     Partners: Male   Lifestyle    Physical activity     Days per week: 0 days     Minutes per session: 0 min    Stress:  To some extent   Relationships    Social connections     Talks on phone: Never     Gets together: Never     Attends [employer] service: More than 4 times per year     Active member of club or organization: No     Attends meetings of clubs or organizations: Never     Relationship status:     Intimate partner violence     Fear of current or ex partner: No     Emotionally abused: No     Physically abused: No     Forced sexual activity: No   Other Topics Concern    Not on file   Social History Narrative    Education: bachelor's degree in communications    Learning Disabilities: none    Marital History:     Children: 1 adoptive son     Living Arrangement: lives in home with  and son    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, on permanent disability    Functioning Relationships:  is supportive    Legal History: none     History: None       Family Psychiatric History:     Family History   Problem Relation Age of Onset    Depression Maternal Grandmother     Bipolar disorder Paternal Aunt     Substance Abuse Neg Hx     Suicide Attempts Neg Hx        History Review: The following portions of the patient's history were reviewed and updated as appropriate: allergies, current medications, past family history, past medical history, past social history, past surgical history and problem list           HPI     Past Medical History:   Diagnosis Date    Allergic rhinitis     Arthritis     Asthma     Atelectasis     Atopic rhinitis     Eczema     Hyperlipidemia     Low back pain     MVP (mitral valve prolapse)     Palpitations     Subconjunctival hemorrhage     Vitamin D deficiency        Past Surgical History:   Procedure Laterality Date    NO PAST SURGERIES         Current Outpatient Medications   Medication Sig Dispense Refill    albuterol (PROAIR HFA) 90 mcg/act inhaler Inhale 2 puffs every 4 (four) hours as needed      ALPRAZolam (XANAX) 0 5 mg tablet Take 1 tablet (0 5 mg total) by mouth 3 (three) times a day as needed for anxiety To be filled on or after 2/4/21 90 tablet 3    ARIPiprazole (ABILIFY) 5 mg tablet Take 1 tablet (5 mg total) by mouth every evening 30 tablet 4    calcium carbonate (OS-GABE) 1250 (500 Ca) MG tablet Take 1 tablet by mouth daily      Cholecalciferol (VITAMIN D3) 2000 units TABS Take 1 capsule by mouth daily      fluticasone (FLONASE) 50 mcg/act nasal spray 2 sprays into each nostril daily      hydrocortisone 2 5 % cream Apply topically 2 (two) times a day as needed      multivitamin (THERAGRAN) TABS Take 1 tablet by mouth daily      PARoxetine (PAXIL) 40 MG tablet Take 1 tablet (40 mg total) by mouth every evening 30 tablet 4    verapamil (VERELAN PM) 180 MG 24 hr capsule Take 1 capsule by mouth once a week       No current facility-administered medications for this visit  No Known Allergies    Video Exam    Vitals:    03/12/21 1432   Weight: 72 6 kg (160 lb)   Height: 5' 3" (1 6 m)       Mental Status Evaluation:    Appearance age appropriate, casually dressed   Behavior cooperative, mildly anxious   Speech normal rate, normal volume, normal pitch   Mood mildly anxious, less depressed   Affect constricted   Thought Processes organized, goal directed   Associations intact associations   Thought Content no overt delusions   Perceptual Disturbances: no auditory hallucinations, no visual hallucinations   Abnormal Thoughts  Risk Potential Suicidal ideation - None  Homicidal ideation - None  Potential for aggression - No   Orientation oriented to person, place, time/date and situation   Memory recent and remote memory grossly intact   Consciousness alert and awake   Attention Span Concentration Span decreased attention span  decreased concentration   Intellect appears to be of average intelligence   Insight intact   Judgement intact   Muscle Strength and  Gait normal muscle strength and normal muscle tone, normal gait and normal balance   Motor activity no abnormal movements   Language no difficulty naming common objects, no difficulty repeating a phrase, no difficulty writing a sentence   Fund of Knowledge adequate knowledge of current events  adequate fund of knowledge regarding past history  adequate fund of knowledge regarding vocabulary    Pain moderate   Pain Scale 6       Laboratory Results: I have personally reviewed all pertinent laboratory/tests results    Recent Labs (last 4 months):   No visits with results within 4 Month(s) from this visit     Latest known visit with results is:   Office Visit on 10/05/2020   Component Date Value    SARS-CoV-2  10/05/2020 Not Detected        Suicide/Homicide Risk Assessment:    Risk of Harm to Self:  Demographic risk factors include: age: over 48 or older  Historical Risk Factors include: chronic depressive symptoms, chronic anxiety symptoms  Recent Specific Risk Factors include: diagnosis of depression, current depressive symptoms, current anxiety symptoms  Protective Factors: no current suicidal ideation, being a parent, being , compliant with medications, compliant with mental health treatment, connection to own children, personal beliefs, responsibilities and duties to others, stable living environment, supportive family  Weapons: none  The following steps have been taken to ensure weapons are properly secured: not applicable  Based on today's assessment, Adria Grimaldo presents the following risk of harm to self: minimal    Risk of Harm to Others: The following ratings are based on assessment at the time of the interview  Based on today's assessment, Adria Grimaldo presents the following risk of harm to others: none    The following interventions are recommended: no intervention changes needed     Assessment/Plan:       Diagnoses and all orders for this visit:    Major depressive disorder, recurrent severe without psychotic features [facility] )  -     PARoxetine (PAXIL) 40 MG tablet; Take 1 tablet (40 mg total) by mouth every evening  -     ARIPiprazole (ABILIFY) 5 mg tablet; Take 1 tablet (5 mg total) by mouth every evening    JOSE ROBERTO (generalized anxiety disorder)  -     PARoxetine (PAXIL) 40 MG tablet;  Take 1 tablet (40 mg total) by mouth every evening    Long-term use of high-risk medication          Treatment Recommendations/Precautions:    Continue Paxil 40 mg every evening to improve depressive symptoms  Continue Xanax 0 5 mg three times a day as needed to improve anxiety symptoms  Continue Abilify 5 mg every evening to augment antidepressant  Medication management every 3 months  Follows with family physician for glucose and lipid monitoring due to current therapy with antipsychotic medication  Follows with family physician for mitral valve prolapse, asthma, arthritis and hyperlipidemia  Aware of 24 hour and weekend coverage for urgent situations accessed by calling [facility] main practice number  Check CBC/diff, CMP, lipid profile and hemoglobin A1C before next visit due to current therapy with antipsychotic medication - she has a lab slip, has not done labs yet since the last visit    Medications Risks/Benefits      Risks, Benefits And Possible Side Effects Of Medications:    Risks, benefits, and possible side effects of medications explained to Alejandro Diaz including risk of parkinsonian symptoms, Tardive Dyskinesia and metabolic syndrome related to treatment with antipsychotic medications, risk of suicidality and serotonin syndrome related to treatment with antidepressants and risks of misuse, abuse or dependence, sedation and respiratory depression related to treatment with benzodiazepine medications  She verbalizes understanding and agreement for treatment  Controlled Medication Discussion:     Alejandro Diaz has been filling controlled prescriptions on time as prescribed according to [address]  Discussed with Alejandro Diaz the risks of sedation, respiratory depression, impairment of ability to drive and potential for abuse and addiction related to treatment with benzodiazepine medications  She understands risk of treatment with benzodiazepine medications, agrees to not drive if feels impaired and agrees to take medications as prescribed    Psychotherapy Provided:     Individual psychotherapy provided: Yes  Counseling was provided during the session today for 16 minutes  Medications, treatment progress and treatment plan reviewed with Alejandro Diaz  Goals discussed during in session: maintain control of anxiety and help with depression  Discussed with Alejandro Diaz coping with COVID-19 issues and son's illness  Coping techniques including doreen, keeping busy at home and listening to gospel music reviewed with Quinn Ahuja  Supportive therapy provided  Treatment Plan:    Completed and signed during the session: Yes - Treatment Plan done but not signed at time of office visit due to:  Plan reviewed by video and verbal consent given due to Lesa social distancing     Note Share: This note was shared with patient  I spent 30 minutes with patient today in which greater than 50% of the time was spent in counseling/coordination of care regarding coping with ongoing COVID-19 pandemic and son's illness      [facility] acknowledges that she has consented to an online visit or consultation  She understands that the online visit is based solely on information provided by her, and that, in the absence of a face-to-face physical evaluation by the physician, the diagnosis she receives is both limited and provisional in terms of accuracy and completeness  This is not intended to replace a full medical face-to-face evaluation by the physician  Lee Terry understands and accepts these terms

## 2021-03-12 ENCOUNTER — TELEMEDICINE (OUTPATIENT)
Dept: PSYCHIATRY | Facility: CLINIC | Age: 61
End: 2021-03-12
Payer: COMMERCIAL

## 2021-03-12 VITALS — HEIGHT: 63 IN | BODY MASS INDEX: 28.35 KG/M2 | WEIGHT: 160 LBS

## 2021-03-12 DIAGNOSIS — F41.1 GAD (GENERALIZED ANXIETY DISORDER): Chronic | ICD-10-CM

## 2021-03-12 DIAGNOSIS — Z79.899 LONG-TERM USE OF HIGH-RISK MEDICATION: Chronic | ICD-10-CM

## 2021-03-12 DIAGNOSIS — F33.2 MAJOR DEPRESSIVE DISORDER, RECURRENT SEVERE WITHOUT PSYCHOTIC FEATURES (HCC): Primary | Chronic | ICD-10-CM

## 2021-03-12 PROCEDURE — 90833 PSYTX W PT W E/M 30 MIN: CPT | Performed by: PSYCHIATRY & NEUROLOGY

## 2021-03-12 PROCEDURE — 99213 OFFICE O/P EST LOW 20 MIN: CPT | Performed by: PSYCHIATRY & NEUROLOGY

## 2021-03-12 RX ORDER — ARIPIPRAZOLE 5 MG/1
5 TABLET ORAL EVERY EVENING
Qty: 30 TABLET | Refills: 4 | Status: SHIPPED | OUTPATIENT
Start: 2021-03-12 | End: 2021-06-30 | Stop reason: SDUPTHER

## 2021-03-12 RX ORDER — PAROXETINE HYDROCHLORIDE 40 MG/1
40 TABLET, FILM COATED ORAL EVERY EVENING
Qty: 30 TABLET | Refills: 4 | Status: SHIPPED | OUTPATIENT
Start: 2021-03-12 | End: 2021-06-30 | Stop reason: SDUPTHER

## 2021-03-12 NOTE — BH TREATMENT PLAN
TREATMENT PLAN (Medication Management Only)        [facility]    Name/Date of Birth/MRN:  Lee Terry 61 y o  1960 MRN: [id number]  Date of Treatment Plan: March 12, 2021  Diagnosis/Diagnoses:   1  Major depressive disorder, recurrent severe without psychotic features ([facility] [address] )    2  JOSE ROBERTO (generalized anxiety disorder)    3  Long-term use of high-risk medication      Strengths/Personal Resources for Self-Care: "helping other people, caring for others"  Area/Areas of need (in own words): "organizing"  1  Long Term Goal:   maintain control of anxiety, improve control of depression  Target Date: 3 months - 6/12/2021  Person/Persons responsible for completion of goal: Quinn Ahuaj  2  Short Term Objective (s) - How will we reach this goal?:   A  Provider new recommended medication/dosage changes and/or continue medication(s): continue current medications as prescribed (Paxil, Abilify and Xanax)  B   N/A   C   N/A  Target Date: 3 months - 6/12/2021  Person/Persons Responsible for Completion of Goal: Quinn Ahuja   Progress Towards Goals: progressing  Treatment Modality: medication management every 3 months  Review due 180 days from date of this plan: 6 months - 9/12/2021  Expected length of service: ongoing treatment unless revised  My Physician/PA/NP and I have developed this plan together and I agree to work on the goals and objectives  I understand the treatment goals that were developed for my treatment    Electronic Signatures: on file (unless signed below)    Maikel Jones MD 03/12/21

## 2021-03-19 ENCOUNTER — LAB (OUTPATIENT)
Dept: LAB | Facility: CLINIC | Age: 61
End: 2021-03-19
Payer: COMMERCIAL

## 2021-03-19 ENCOUNTER — TRANSCRIBE ORDERS (OUTPATIENT)
Dept: LAB | Facility: CLINIC | Age: 61
End: 2021-03-19

## 2021-03-19 DIAGNOSIS — Z79.899 LONG-TERM USE OF HIGH-RISK MEDICATION: Chronic | ICD-10-CM

## 2021-03-19 DIAGNOSIS — F33.2 MAJOR DEPRESSIVE DISORDER, RECURRENT SEVERE WITHOUT PSYCHOTIC FEATURES (HCC): Chronic | ICD-10-CM

## 2021-03-19 LAB
ALBUMIN SERPL BCP-MCNC: 3.9 G/DL (ref 3.5–5)
ALP SERPL-CCNC: 81 U/L (ref 46–116)
ALT SERPL W P-5'-P-CCNC: 29 U/L (ref 12–78)
ANION GAP SERPL CALCULATED.3IONS-SCNC: 4 MMOL/L (ref 4–13)
AST SERPL W P-5'-P-CCNC: 10 U/L (ref 5–45)
BASOPHILS # BLD AUTO: 0.03 THOUSANDS/ΜL (ref 0–0.1)
BASOPHILS NFR BLD AUTO: 1 % (ref 0–1)
BILIRUB SERPL-MCNC: 0.4 MG/DL (ref 0.2–1)
BUN SERPL-MCNC: 10 MG/DL (ref 5–25)
CALCIUM SERPL-MCNC: 9.2 MG/DL (ref 8.3–10.1)
CHLORIDE SERPL-SCNC: 102 MMOL/L (ref 100–108)
CHOLEST SERPL-MCNC: 229 MG/DL (ref 50–200)
CO2 SERPL-SCNC: 32 MMOL/L (ref 21–32)
CREAT SERPL-MCNC: 1 MG/DL (ref 0.6–1.3)
EOSINOPHIL # BLD AUTO: 0.14 THOUSAND/ΜL (ref 0–0.61)
EOSINOPHIL NFR BLD AUTO: 3 % (ref 0–6)
ERYTHROCYTE [DISTWIDTH] IN BLOOD BY AUTOMATED COUNT: 13.9 % (ref 11.6–15.1)
EST. AVERAGE GLUCOSE BLD GHB EST-MCNC: 111 MG/DL
GFR SERPL CREATININE-BSD FRML MDRD: 71 ML/MIN/1.73SQ M
GLUCOSE P FAST SERPL-MCNC: 86 MG/DL (ref 65–99)
HBA1C MFR BLD: 5.5 %
HCT VFR BLD AUTO: 43 % (ref 34.8–46.1)
HDLC SERPL-MCNC: 60 MG/DL
HGB BLD-MCNC: 13.4 G/DL (ref 11.5–15.4)
IMM GRANULOCYTES # BLD AUTO: 0.02 THOUSAND/UL (ref 0–0.2)
IMM GRANULOCYTES NFR BLD AUTO: 0 % (ref 0–2)
LDLC SERPL CALC-MCNC: 155 MG/DL (ref 0–100)
LYMPHOCYTES # BLD AUTO: 1.53 THOUSANDS/ΜL (ref 0.6–4.47)
LYMPHOCYTES NFR BLD AUTO: 28 % (ref 14–44)
MCH RBC QN AUTO: 26.2 PG (ref 26.8–34.3)
MCHC RBC AUTO-ENTMCNC: 31.2 G/DL (ref 31.4–37.4)
MCV RBC AUTO: 84 FL (ref 82–98)
MONOCYTES # BLD AUTO: 0.48 THOUSAND/ΜL (ref 0.17–1.22)
MONOCYTES NFR BLD AUTO: 9 % (ref 4–12)
NEUTROPHILS # BLD AUTO: 3.21 THOUSANDS/ΜL (ref 1.85–7.62)
NEUTS SEG NFR BLD AUTO: 59 % (ref 43–75)
NONHDLC SERPL-MCNC: 169 MG/DL
NRBC BLD AUTO-RTO: 0 /100 WBCS
PLATELET # BLD AUTO: 241 THOUSANDS/UL (ref 149–390)
PMV BLD AUTO: 9.7 FL (ref 8.9–12.7)
POTASSIUM SERPL-SCNC: 3.7 MMOL/L (ref 3.5–5.3)
PROT SERPL-MCNC: 7.6 G/DL (ref 6.4–8.2)
RBC # BLD AUTO: 5.11 MILLION/UL (ref 3.81–5.12)
SODIUM SERPL-SCNC: 138 MMOL/L (ref 136–145)
TRIGL SERPL-MCNC: 70 MG/DL
WBC # BLD AUTO: 5.41 THOUSAND/UL (ref 4.31–10.16)

## 2021-03-19 PROCEDURE — 36415 COLL VENOUS BLD VENIPUNCTURE: CPT

## 2021-03-19 PROCEDURE — 80061 LIPID PANEL: CPT

## 2021-03-19 PROCEDURE — 85025 COMPLETE CBC W/AUTO DIFF WBC: CPT

## 2021-03-19 PROCEDURE — 80053 COMPREHEN METABOLIC PANEL: CPT

## 2021-03-19 PROCEDURE — 83036 HEMOGLOBIN GLYCOSYLATED A1C: CPT

## 2021-05-14 NOTE — PROGRESS NOTES
Treatment Plan done but not signed at time of office visit due to:  Plan reviewed by phone or in person  and verbal consent given due to [address] distancing given at 9/8/20 for 3/6/19 plan

## 2021-06-30 ENCOUNTER — TELEMEDICINE (OUTPATIENT)
Dept: PSYCHIATRY | Facility: CLINIC | Age: 61
End: 2021-06-30
Payer: COMMERCIAL

## 2021-06-30 VITALS — HEIGHT: 63 IN | BODY MASS INDEX: 28.35 KG/M2 | WEIGHT: 160 LBS

## 2021-06-30 DIAGNOSIS — F33.2 MAJOR DEPRESSIVE DISORDER, RECURRENT SEVERE WITHOUT PSYCHOTIC FEATURES (HCC): Primary | Chronic | ICD-10-CM

## 2021-06-30 DIAGNOSIS — Z79.899 LONG-TERM USE OF HIGH-RISK MEDICATION: Chronic | ICD-10-CM

## 2021-06-30 DIAGNOSIS — F41.1 GAD (GENERALIZED ANXIETY DISORDER): Chronic | ICD-10-CM

## 2021-06-30 PROCEDURE — 90833 PSYTX W PT W E/M 30 MIN: CPT | Performed by: PSYCHIATRY & NEUROLOGY

## 2021-06-30 PROCEDURE — 99214 OFFICE O/P EST MOD 30 MIN: CPT | Performed by: PSYCHIATRY & NEUROLOGY

## 2021-06-30 RX ORDER — HYDROQUINONE 40 MG/G
CREAM TOPICAL 2 TIMES DAILY
COMMUNITY
Start: 2021-04-22 | End: 2022-04-26

## 2021-06-30 RX ORDER — ALPRAZOLAM 0.5 MG/1
0.5 TABLET ORAL 3 TIMES DAILY PRN
Qty: 90 TABLET | Refills: 4 | Status: SHIPPED | OUTPATIENT
Start: 2021-06-30 | End: 2021-12-07 | Stop reason: SDUPTHER

## 2021-06-30 RX ORDER — PAROXETINE HYDROCHLORIDE 40 MG/1
40 TABLET, FILM COATED ORAL EVERY EVENING
Qty: 30 TABLET | Refills: 4 | Status: SHIPPED | OUTPATIENT
Start: 2021-06-30 | End: 2021-12-07 | Stop reason: SDUPTHER

## 2021-06-30 RX ORDER — ARIPIPRAZOLE 5 MG/1
5 TABLET ORAL EVERY EVENING
Qty: 30 TABLET | Refills: 4 | Status: SHIPPED | OUTPATIENT
Start: 2021-06-30 | End: 2021-12-07 | Stop reason: SDUPTHER

## 2021-06-30 RX ORDER — FEXOFENADINE HCL 180 MG/1
180 TABLET ORAL DAILY
COMMUNITY

## 2021-06-30 NOTE — PSYCH
Virtual Regular Visit      Assessment/Plan:    Problem List Items Addressed This Visit        Other    Major depressive disorder, recurrent severe without psychotic features ([facility] [address] ) - Primary (Chronic)    Relevant Medications    ALPRAZolam (XANAX) 0 5 mg tablet    ARIPiprazole (ABILIFY) 5 mg tablet    PARoxetine (PAXIL) 40 MG tablet    JOSE ROBERTO (generalized anxiety disorder) (Chronic)    Relevant Medications    ALPRAZolam (XANAX) 0 5 mg tablet    ARIPiprazole (ABILIFY) 5 mg tablet    PARoxetine (PAXIL) 40 MG tablet    Long-term use of high-risk medication (Chronic)          Reason for visit is   Chief Complaint   Patient presents with    Medication Management    Follow-up    Virtual Regular Visit      Encounter provider Desean Perry MD    Provider located at [address] [place] [id number] [phone]    Recent Visits  No visits were found meeting these conditions  Showing recent visits within past 7 days and meeting all other requirements  Today's Visits  Date Type Provider Dept   06/30/21 Telemedicine Marycarmen Corbett MD [facility] 18 today's visits and meeting all other requirements  Future Appointments  No visits were found meeting these conditions  Showing future appointments within next 150 days and meeting all other requirements       The patient was identified by name and date of birth  Linda Walters was informed that this is a telemedicine visit and that the visit is being conducted through [address] [place] [address] Now and patient was informed that this is a secure, HIPAA-compliant platform  She agrees to proceed     My office door was closed  No one else was in the room  She acknowledged consent and understanding of privacy and security of the video platform  The patient has agreed to participate and understands they can discontinue the visit at any time  Patient is aware this is a billable service  Subjective:    Cassandra Vázquez is a 61 y o  female with a history of Major Depressive Disorder and Generalized Anxiety Disorder  She has experienced on and off symptoms since the last visit  She reports some depression "I feel sad sometimes"  She continues to experience anxiety symptoms on and off and feels overwhelmed "I kind of shut down and then I pull myself out of it"  She worries often about her autistic son who just turned 25 "he is an adult now  I have to get him an identification card"  She denies any suicidal ideation, intent or plan at present; denies any homicidal ideation, intent or plan at present  She has no auditory hallucinations, denies any visual hallucinations, no overt delusions noted  She denies any side effects from current psychiatric medications  Gerhardt Sep HPI ROS Appetite Changes and Sleep:     She reports normal sleep, adequate number of sleep hours (6 hours), normal appetite, no weight change, low energy    Current Rating Scores:     Current PHQ-9   PHQ-9 Score (since 5/30/2021)     PHQ-9 Score  11        Current PHQ-9 score is slightly increased from 8 at the last visit)  Review Of Systems:      Constitutional low energy   ENT negative   Cardiovascular negative   Respiratory negative   Gastrointestinal negative   Genitourinary negative   Musculoskeletal negative   Integumentary negative   Neurological negative   Endocrine negative   Other Symptoms none, all other systems are negative       Past Psychiatric History: (unchanged information from previous note copied and updated)    Past Inpatient Psychiatric Treatment:   No history of past inpatient psychiatric admissions  Past Outpatient Psychiatric Treatment:   In outpatient treatment at [address] [place] [address] for many years    Past Suicide Attempts: no  Past Violent Behavior: no  Past Psychiatric Medication Trials: Paxil, Prozac, Zoloft, Effexor, Wellbutrin and Xanax    Traumatic History: (unchanged information from previous note copied and updated)    Abuse: no history of physical or sexual abuse  Other Traumatic Events: none     Substance Abuse History:    Social History     Substance and Sexual Activity   Alcohol Use Yes    Comment: Social alcohol use     Social History     Substance and Sexual Activity   Drug Use No       Social History:    Social History     Socioeconomic History    Marital status: /Civil Union     Spouse name: Not on file    Number of children: 1    Years of education: bachelor's degree     Highest education level: Bachelor's degree (e g , BA, AB, BS)   Occupational History    Occupation: On disability; worked in office management   Tobacco Use    Smoking status: Never Smoker    Smokeless tobacco: Never Used   Vaping Use    Vaping Use: Never used   Substance and Sexual Activity    Alcohol use: Yes     Comment: Social alcohol use    Drug use: No    Sexual activity: Yes     Partners: Male   Other Topics Concern    Not on file   Social History Narrative    Education: bachelor's degree in communications    Learning Disabilities: none    Marital History:     Children: 1 adoptive son     Living Arrangement: lives in home with  and son    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, on permanent disability    Functioning Relationships:  is supportive    Legal History: none     History: None     Social Determinants of Health     Financial Resource Strain:     Difficulty of Paying Living Expenses:    Food Insecurity: No Food Insecurity    Worried About Running Out of Food in the Last Year: Never true    [address] in the Last Year: Never true   Transportation Needs: No Transportation Needs    Lack of Transportation (Medical): No    Lack of Transportation (Non-Medical): No   Physical Activity: Inactive    Days of Exercise per Week: 0 days    Minutes of Exercise per Session: 0 min   Stress: Stress Concern Present    Feeling of Stress :  To some extent   Social Connections: Moderately Isolated    Frequency of Communication with Friends and Family: Never    Frequency of Social Gatherings with Friends and Family: Never    Attends Taoism Services: More than 4 times per year    Active Member of [employer] or Organizations: No    Attends Club or Organization Meetings: Never    Marital Status:    Intimate Partner Violence: Not At Risk    Fear of Current or Ex-Partner: No    Emotionally Abused: No    Physically Abused: No    Sexually Abused: No       Family Psychiatric History:     Family History   Problem Relation Age of Onset    Depression Maternal Grandmother     Bipolar disorder Paternal Aunt     Substance Abuse Neg Hx     Suicide Attempts Neg Hx        History Review: The following portions of the patient's history were reviewed and updated as appropriate: allergies, current medications, past family history, past medical history, past social history, past surgical history and problem list           HPI     Past Medical History:   Diagnosis Date    Allergic rhinitis     Arthritis     Asthma     Atelectasis     Atopic rhinitis     Eczema     Hyperlipidemia     Low back pain     MVP (mitral valve prolapse)     Palpitations     Subconjunctival hemorrhage     Vitamin D deficiency        Past Surgical History:   Procedure Laterality Date    NO PAST SURGERIES         Current Outpatient Medications   Medication Sig Dispense Refill    albuterol (PROAIR HFA) 90 mcg/act inhaler Inhale 2 puffs every 4 (four) hours as needed      ALPRAZolam (XANAX) 0 5 mg tablet Take 1 tablet (0 5 mg total) by mouth 3 (three) times a day as needed for anxiety 90 tablet 4    ARIPiprazole (ABILIFY) 5 mg tablet Take 1 tablet (5 mg total) by mouth every evening 30 tablet 4    calcium carbonate (OS-GABE) 1250 (500 Ca) MG tablet Take 1 tablet by mouth daily      Cholecalciferol (VITAMIN D3) 2000 units TABS Take 1 capsule by mouth daily      fexofenadine (ALLEGRA) 180 MG tablet Take 180 mg by mouth daily      fluticasone (FLONASE) 50 mcg/act nasal spray 2 sprays into each nostril daily      hydrocortisone 2 5 % cream Apply topically 2 (two) times a day as needed      hydroquinone 4 % cream Apply topically 2 (two) times a day      multivitamin (THERAGRAN) TABS Take 1 tablet by mouth daily      PARoxetine (PAXIL) 40 MG tablet Take 1 tablet (40 mg total) by mouth every evening 30 tablet 4    verapamil (VERELAN PM) 180 MG 24 hr capsule Take 1 capsule by mouth once a week       No current facility-administered medications for this visit          No Known Allergies    Video Exam    Vitals:    06/30/21 1647   Weight: 72 6 kg (160 lb)   Height: 5' 3" (1 6 m)       Mental Status Evaluation:    Appearance age appropriate, casually dressed   Behavior cooperative, appears anxious   Speech normal rate, normal volume, normal pitch   Mood depressed, anxious   Affect constricted   Thought Processes organized, goal directed   Associations intact associations   Thought Content no overt delusions   Perceptual Disturbances: no auditory hallucinations, no visual hallucinations   Abnormal Thoughts  Risk Potential Suicidal ideation - None  Homicidal ideation - None  Potential for aggression - No   Orientation oriented to person, place, time/date and situation   Memory recent and remote memory grossly intact   Consciousness alert and awake   Attention Span Concentration Span attention span and concentration appear shorter than expected for age   Intellect appears to be of average intelligence   Insight intact   Judgement intact   Muscle Strength and  Gait normal muscle strength and normal muscle tone, normal gait and normal balance   Motor activity no abnormal movements   Language no difficulty naming common objects, no difficulty repeating a phrase, no difficulty writing a sentence   Fund of Knowledge adequate knowledge of current events  adequate fund of knowledge regarding past history  adequate fund of knowledge regarding vocabulary    Pain none   Pain Scale 0       Laboratory Results: I have personally reviewed all pertinent laboratory/tests results    Recent Labs (last 6 months):   Lab on 03/19/2021   Component Date Value    WBC 03/19/2021 5 41     RBC 03/19/2021 5 11     Hemoglobin 03/19/2021 13 4     Hematocrit 03/19/2021 43 0     MCV 03/19/2021 84     MCH 03/19/2021 26 2*    MCHC 03/19/2021 31 2*    RDW 03/19/2021 13 9     MPV 03/19/2021 9 7     Platelets 03/50/2229 241     nRBC 03/19/2021 0     Neutrophils Relative 03/19/2021 59     Immat GRANS % 03/19/2021 0     Lymphocytes Relative 03/19/2021 28     Monocytes Relative 03/19/2021 9     Eosinophils Relative 03/19/2021 3     Basophils Relative 03/19/2021 1     Neutrophils Absolute 03/19/2021 3 21     Immature Grans Absolute 03/19/2021 0 02     Lymphocytes Absolute 03/19/2021 1 53     Monocytes Absolute 03/19/2021 0 48     Eosinophils Absolute 03/19/2021 0 14     Basophils Absolute 03/19/2021 0 03     Sodium 03/19/2021 138     Potassium 03/19/2021 3 7     Chloride 03/19/2021 102     CO2 03/19/2021 32     ANION GAP 03/19/2021 4     BUN 03/19/2021 10     Creatinine 03/19/2021 1 00     Glucose, Fasting 03/19/2021 86     Calcium 03/19/2021 9 2     AST 03/19/2021 10     ALT 03/19/2021 29     Alkaline Phosphatase 03/19/2021 81     Total Protein 03/19/2021 7 6     Albumin 03/19/2021 3 9     Total Bilirubin 03/19/2021 0 40     eGFR 03/19/2021 71     Cholesterol 03/19/2021 229*    Triglycerides 03/19/2021 70     HDL, Direct 03/19/2021 60     LDL Calculated 03/19/2021 155*    Non-HDL-Chol (CHOL-HDL) 03/19/2021 169     Hemoglobin A1C 03/19/2021 5 5     EAG 03/19/2021 111        Suicide/Homicide Risk Assessment:    Risk of Harm to Self:  Demographic risk factors include: age: over 48 or older  Historical Risk Factors include: chronic depressive symptoms, chronic anxiety symptoms  Recent Specific Risk Factors include: diagnosis of depression, current depressive symptoms, current anxiety symptoms  Protective Factors: no current suicidal ideation, being a parent, being , compliant with medications, compliant with mental health treatment, connection to own children, responsibilities and duties to others, stable living environment  Weapons: none  The following steps have been taken to ensure weapons are properly secured: not applicable  Based on today's assessment, Karly Santizo presents the following risk of harm to self: minimal    Risk of Harm to Others: The following ratings are based on assessment at the time of the interview  Based on today's assessment, Karly Santizo presents the following risk of harm to others: none    The following interventions are recommended: no intervention changes needed     Assessment/Plan:       Diagnoses and all orders for this visit:    Major depressive disorder, recurrent severe without psychotic features (HCC)  -     ARIPiprazole (ABILIFY) 5 mg tablet; Take 1 tablet (5 mg total) by mouth every evening  -     PARoxetine (PAXIL) 40 MG tablet; Take 1 tablet (40 mg total) by mouth every evening    JOSE ROBERTO (generalized anxiety disorder)  -     ALPRAZolam (XANAX) 0 5 mg tablet; Take 1 tablet (0 5 mg total) by mouth 3 (three) times a day as needed for anxiety  -     PARoxetine (PAXIL) 40 MG tablet; Take 1 tablet (40 mg total) by mouth every evening    Long-term use of high-risk medication    Other orders  -     fexofenadine (ALLEGRA) 180 MG tablet;  Take 180 mg by mouth daily  -     hydroquinone 4 % cream; Apply topically 2 (two) times a day          Treatment Recommendations/Precautions:    Continue Paxil 40 mg every evening to improve depressive symptoms  Continue Xanax 0 5 mg three times a day as needed to improve anxiety symptoms  Continue Abilify 5 mg every evening to augment antidepressant  Medication management every 3 months  Does not want to see a therapist  Follows with family physician for glucose and lipid monitoring due to current therapy with antipsychotic medication  Follows with family physician for yearly physical exam, asthma, arthritis and hyperlipidemia  Aware of 24 hour and weekend coverage for urgent situations accessed by calling [facility] main practice number  Does not want any medication changes  Monitor lipid profile and hemoglobin A1C yearly due to current therapy with antipsychotic medication    Medications Risks/Benefits      Risks, Benefits And Possible Side Effects Of Medications:    Risks, benefits, and possible side effects of medications explained to [facility] including risk of parkinsonian symptoms, Tardive Dyskinesia and metabolic syndrome related to treatment with antipsychotic medications, risk of suicidality and serotonin syndrome related to treatment with antidepressants and risks of misuse, abuse or dependence, sedation and respiratory depression related to treatment with benzodiazepine medications  She verbalizes understanding and agreement for treatment  Controlled Medication Discussion:     [facility] has been filling controlled prescriptions on time as prescribed according to [address]  Discussed with [facility] the risks of sedation, respiratory depression, impairment of ability to drive and potential for abuse and addiction related to treatment with benzodiazepine medications  She understands risk of treatment with benzodiazepine medications, agrees to not drive if feels impaired and agrees to take medications as prescribed    Psychotherapy Provided:     Individual psychotherapy provided: Yes  Counseling was provided during the session today for 16 minutes  Medications, treatment progress and treatment plan reviewed with [facility]  Goals discussed during in session: improve control of anxiety and improve depression  Discussed with [facility] coping with ongoing anxiety and autistic son turning 25     Coping strategies including doreen and organizing tasks at home reviewed with Mark Li  Supportive therapy provided  Treatment Plan:    Completed and signed during the session: Yes - Treatment Plan done but not signed at time of office visit due to:  Plan reviewed by video and verbal consent given due to Lesa social distancing     Note Share: This note was shared with patient  I spent 30 minutes with patient today in which greater than 50% of the time was spent in counseling/coordination of care regarding coping with life changes for her son      [address] acknowledges that she has consented to an online visit or consultation  She understands that the online visit is based solely on information provided by her, and that, in the absence of a face-to-face physical evaluation by the physician, the diagnosis she receives is both limited and provisional in terms of accuracy and completeness  This is not intended to replace a full medical face-to-face evaluation by the physician  Michelle Thomas understands and accepts these terms

## 2021-06-30 NOTE — BH TREATMENT PLAN
TREATMENT PLAN (Medication Management Only)        [facility]    Name/Date of Birth/MRN:  Rodrigo Schneider 61 y o  1960 MRN: [id number]  Date of Treatment Plan: June 30, 2021  Diagnosis/Diagnoses:   1  Major depressive disorder, recurrent severe without psychotic features ([facility] [address] )    2  JOSE ROBERTO (generalized anxiety disorder)    3  Long-term use of high-risk medication      Strengths/Personal Resources for Self-Care: "compassion"  Area/Areas of need (in own words): "organization skills, focus, exercise"  1  Long Term Goal:   improve control of anxiety, improve depression  Target Date: 3 months - 9/30/2021  Person/Persons responsible for completion of goal: Kj Roberts  2  Short Term Objective (s) - How will we reach this goal?:   A  Provider new recommended medication/dosage changes and/or continue medication(s): continue current medications as prescribed (Paxil, Abilify and Xanax)  B   N/A   C   N/A  Target Date: 3 months - 9/30/2021  Person/Persons Responsible for Completion of Goal: Kj Roberts   Progress Towards Goals: progressing  Treatment Modality: medication management every 3 months  Review due 180 days from date of this plan: 6 months - 12/30/2021  Expected length of service: ongoing treatment unless revised  My Physician/PA/NP and I have developed this plan together and I agree to work on the goals and objectives  I understand the treatment goals that were developed for my treatment    Electronic Signatures: on file (unless signed below)    Martita Phan MD 06/30/21

## 2021-12-07 ENCOUNTER — OFFICE VISIT (OUTPATIENT)
Dept: PSYCHIATRY | Facility: CLINIC | Age: 61
End: 2021-12-07
Payer: COMMERCIAL

## 2021-12-07 VITALS
SYSTOLIC BLOOD PRESSURE: 116 MMHG | HEART RATE: 65 BPM | DIASTOLIC BLOOD PRESSURE: 79 MMHG | WEIGHT: 160 LBS | BODY MASS INDEX: 28.35 KG/M2 | HEIGHT: 63 IN

## 2021-12-07 DIAGNOSIS — F41.1 GAD (GENERALIZED ANXIETY DISORDER): Chronic | ICD-10-CM

## 2021-12-07 DIAGNOSIS — Z79.899 LONG-TERM USE OF HIGH-RISK MEDICATION: Chronic | ICD-10-CM

## 2021-12-07 DIAGNOSIS — F33.2 MAJOR DEPRESSIVE DISORDER, RECURRENT SEVERE WITHOUT PSYCHOTIC FEATURES (HCC): Primary | Chronic | ICD-10-CM

## 2021-12-07 PROCEDURE — 90833 PSYTX W PT W E/M 30 MIN: CPT | Performed by: PSYCHIATRY & NEUROLOGY

## 2021-12-07 PROCEDURE — 99214 OFFICE O/P EST MOD 30 MIN: CPT | Performed by: PSYCHIATRY & NEUROLOGY

## 2021-12-07 RX ORDER — ALPRAZOLAM 0.5 MG/1
0.5 TABLET ORAL 3 TIMES DAILY PRN
Qty: 90 TABLET | Refills: 4 | Status: SHIPPED | OUTPATIENT
Start: 2021-12-07 | End: 2022-04-26 | Stop reason: SDUPTHER

## 2021-12-07 RX ORDER — ARIPIPRAZOLE 5 MG/1
5 TABLET ORAL EVERY EVENING
Qty: 30 TABLET | Refills: 4 | Status: SHIPPED | OUTPATIENT
Start: 2021-12-07 | End: 2022-04-26 | Stop reason: SDUPTHER

## 2021-12-07 RX ORDER — PAROXETINE HYDROCHLORIDE 40 MG/1
40 TABLET, FILM COATED ORAL EVERY EVENING
Qty: 30 TABLET | Refills: 4 | Status: SHIPPED | OUTPATIENT
Start: 2021-12-07 | End: 2022-04-26 | Stop reason: SDUPTHER

## 2022-04-20 NOTE — PSYCH
MEDICATION MANAGEMENT NOTE        [facility]      Name and Date of Birth:  Tanna Wynn 64 y o  1960 MRN: [id number]    Date of Visit: 2022    Reason for Visit:   Chief Complaint   Patient presents with    Medication Management    Follow-up       SUBJECTIVE:    Cortez Javier is seen today for a follow up for Major Depressive Disorder and Generalized Anxiety Disorder  She has experienced on and off symptoms since the last visit  She continues to feel depressed at times and rates mood as 7 on a scale of 1 (best mood) to 10 (worst mood)  She reports increased anxiety symptoms and states that she has been stressed out about managing everyday life and worrying about my son's future"  She denies any suicidal ideation, intent or plan at present; denies any homicidal ideation, intent or plan at present  She has no auditory hallucinations, denies any visual hallucinations, has no delusional thoughts  She denies any side effects from current psychiatric medications  HPI ROS Appetite Changes and Sleep:     She reports fluctuating sleep pattern, decrease in number of sleep hours (5 hours), normal appetite, recent weight gain (3 lbs), low energy    Current Rating Scores:     Current PHQ-9   PHQ-2/9 Depression Screening    Little interest or pleasure in doing things: 2 - more than half the days  Feeling down, depressed, or hopeless: 2 - more than half the days  Trouble falling or staying asleep, or sleeping too much: 1 - several days  Feeling tired or having little energy: 1 - several days  Poor appetite or overeatin - not at all  Feeling bad about yourself - or that you are a failure or have let yourself or your family down: 2 - more than half the days  Trouble concentrating on things, such as reading the newspaper or watching television: 3 - nearly every day  Moving or speaking so slowly that other people could have noticed   Or the opposite - being so fidgety or restless that you have been moving around a lot more than usual: 0 - not at all  Thoughts that you would be better off dead, or of hurting yourself in some way: 0 - not at all  PHQ-9 Score: 11   PHQ-9 Interpretation: Moderate depression        Current PHQ-9 score is increased from 6 at the last visit)  Review Of Systems:      Constitutional low energy and recent weight gain (3 lbs)   ENT negative   Cardiovascular negative   Respiratory negative   Gastrointestinal negative   Genitourinary negative   Musculoskeletal negative   Integumentary negative   Neurological negative   Endocrine negative   Other Symptoms none, all other systems are negative       Past Psychiatric History: (unchanged information from previous note copied and updated)     Past Inpatient Psychiatric Treatment:   No history of past inpatient psychiatric admissions  Past Outpatient Psychiatric Treatment:   In outpatient treatment at [address] [place] [address] E for many years    Past Suicide Attempts: no  Past Violent Behavior: no  Past Psychiatric Medication Trials: Paxil, Prozac, Zoloft, Effexor, Wellbutrin and Xanax    Traumatic History: (unchanged information from previous note copied and updated)    Abuse: no history of physical or sexual abuse  Other Traumatic Events: none     Past Medical History:    Past Medical History:   Diagnosis Date    Allergic rhinitis     Arthritis     Asthma     Atelectasis     Atopic rhinitis     Eczema     Hyperlipidemia     Low back pain     MVP (mitral valve prolapse)     Palpitations     Subconjunctival hemorrhage     Vitamin D deficiency      Past Medical History Pertinent Negatives:   Diagnosis Date Noted    [facility]/2018    Head injury     Seizures ([facility] )      Past Surgical History:   Procedure Laterality Date    NO PAST SURGERIES       No Known Allergies    Substance Abuse History:    Social History     Substance and Sexual Activity   Alcohol Use Yes    Comment: Social alcohol use     Social History     Substance and Sexual Activity   Drug Use No       Social History:    Social History     Socioeconomic History    Marital status: /Civil Union     Spouse name: Not on file    Number of children: 1    Years of education: bachelor's degree     Highest education level: Bachelor's degree (e g , BA, AB, BS)   Occupational History    Occupation: On disability; worked in office management   Tobacco Use    Smoking status: Never Smoker    Smokeless tobacco: Never Used   Vaping Use    Vaping Use: Never used   Substance and Sexual Activity    Alcohol use: Yes     Comment: Social alcohol use    Drug use: No    Sexual activity: Yes     Partners: Male   Other Topics Concern    Not on file   Social History Narrative    Education: bachelor's degree in communications    Learning Disabilities: none    Marital History:     Children: 1 adoptive son     Living Arrangement: lives in home with  and son    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, on permanent disability    Functioning Relationships:  is supportive    Legal History: none     History: None     Social Determinants of Health     Financial Resource Strain: Low Risk     Difficulty of Paying Living Expenses: Not hard at all   Food Insecurity: No Food Insecurity    Worried About [address] in the Last Year: Never true   [address] in the Last Year: Never true   Transportation Needs: No Transportation Needs    Lack of Transportation (Medical): No    Lack of Transportation (Non-Medical): No   Physical Activity: Insufficiently Active    Days of Exercise per Week: 2 days    Minutes of Exercise per Session: 40 min   Stress: Stress Concern Present    Feeling of Stress : To some extent   Social Connections:  Moderately Isolated    Frequency of Communication with Friends and Family: Never    Frequency of Social Gatherings with Friends and Family: Never    Attends Temple Services: More than 4 times per year    Active Member of Clubs or Organizations: No    Attends Club or Organization Meetings: Never    Marital Status:    Intimate Partner Violence: Not At Risk    Fear of Current or Ex-Partner: No    Emotionally Abused: No    Physically Abused: No    Sexually Abused: No   Housing Stability: Low Risk     Unable to Pay for Housing in the Last Year: No    Number of [place] in the Last Year: 1    Unstable Housing in the Last Year: No       Family Psychiatric History:     Family History   Problem Relation Age of Onset    Depression Maternal Grandmother     Bipolar disorder Paternal Aunt     Substance Abuse Neg Hx     Suicide Attempts Neg Hx        History Review:  The following portions of the patient's history were reviewed and updated as appropriate: allergies, current medications, past family history, past medical history, past social history, past surgical history and problem list          OBJECTIVE:     Vital signs in last 24 hours:    Vitals:    04/26/22 1535   BP: 129/84   Pulse: 105   Weight: 73 9 kg (163 lb)   Height: 5' 3" (1 6 m)       Mental Status Evaluation:    Appearance age appropriate, casually dressed   Behavior cooperative, appears anxious   Speech normal rate, normal volume, normal pitch   Mood depressed, anxious   Affect constricted   Thought Processes organized, goal directed   Associations intact associations   Thought Content no overt delusions   Perceptual Disturbances: no auditory hallucinations, no visual hallucinations   Abnormal Thoughts  Risk Potential Suicidal ideation - None  Homicidal ideation - None  Potential for aggression - No   Orientation oriented to person, place, time/date and situation   Memory recent and remote memory grossly intact   Consciousness alert and awake   Attention Span Concentration Span attention span and concentration appear shorter than expected for age   Intellect appears to be of average intelligence   Insight intact   Judgement intact   Muscle Strength and  Gait normal muscle strength and normal muscle tone, normal gait and normal balance   Motor activity no abnormal movements   Language no difficulty naming common objects, no difficulty repeating a phrase, no difficulty writing a sentence   Fund of Knowledge adequate knowledge of current events  adequate fund of knowledge regarding past history  adequate fund of knowledge regarding vocabulary    Pain none   Pain Scale 0       Laboratory Results: I have personally reviewed all pertinent laboratory/tests results    Recent Labs (last 12 months):   No visits with results within 12 Month(s) from this visit     Latest known visit with results is:   Lab on 03/19/2021   Component Date Value    WBC 03/19/2021 5 41     RBC 03/19/2021 5 11     Hemoglobin 03/19/2021 13 4     Hematocrit 03/19/2021 43 0     MCV 03/19/2021 84     MCH 03/19/2021 26 2*    MCHC 03/19/2021 31 2*    RDW 03/19/2021 13 9     MPV 03/19/2021 9 7     Platelets 42/22/8295 241     nRBC 03/19/2021 0     Neutrophils Relative 03/19/2021 59     Immat GRANS % 03/19/2021 0     Lymphocytes Relative 03/19/2021 28     Monocytes Relative 03/19/2021 9     Eosinophils Relative 03/19/2021 3     Basophils Relative 03/19/2021 1     Neutrophils Absolute 03/19/2021 3 21     Immature Grans Absolute 03/19/2021 0 02     Lymphocytes Absolute 03/19/2021 1 53     Monocytes Absolute 03/19/2021 0 48     Eosinophils Absolute 03/19/2021 0 14     Basophils Absolute 03/19/2021 0 03     Sodium 03/19/2021 138     Potassium 03/19/2021 3 7     Chloride 03/19/2021 102     CO2 03/19/2021 32     ANION GAP 03/19/2021 4     BUN 03/19/2021 10     Creatinine 03/19/2021 1 00     Glucose, Fasting 03/19/2021 86     Calcium 03/19/2021 9 2     AST 03/19/2021 10     ALT 03/19/2021 29     Alkaline Phosphatase 03/19/2021 81     Total Protein 03/19/2021 7 6     Albumin 03/19/2021 3 9     Total Bilirubin 03/19/2021 0 40     eGFR 03/19/2021 71     Cholesterol 03/19/2021 229*    Triglycerides 03/19/2021 70     HDL, Direct 03/19/2021 60     LDL Calculated 03/19/2021 155*    Non-HDL-Chol (CHOL-HDL) 03/19/2021 169     Hemoglobin A1C 03/19/2021 5 5     EAG 03/19/2021 111       LIPID PANEL  Order: [id number]   Ref Range & Units 4/19/22  9:40 AM   Cholesterol <200 mg/dL 218 High     Triglyceride <150 mg/dL 64    Cholesterol, HDL, Direct >40 mg/dL 65    Cholesterol, Non-HDL <160 mg/dL 153    Comment: Note: For NCEP interpretive guidelines please refer to the Laboratory Handbook  Cholesterol, LDL, Calculated <130 mg/dL 140 High     Comment: LDL Cholesterol was calculated using the Friedewald equation  Direct measurement of LDL is not indicated for this patient based on [facility]'s analytical algorithm for measurement of LDL Cholesterol  CHOL/HDL Ratio  3 35      COMPREHENSIVE METABOLIC PANEL  Order: [id number]   Ref Range & Units 4/19/22  9:40 AM   Glucose 65 - 99 mg/dL 86    BUN 7 - 25 mg/dL 8    Creatinine 0 40 - 1 10 mg/dL 0 91    Sodium 135 - 145 mmol/L 144    Potassium 3 5 - 5 2 mmol/L 4 2    Chloride 100 - 109 mmol/L 108    Carbon Dioxide 23 - 31 mmol/L 29    Calcium 8 5 - 10 1 mg/dL 9 3    Alkaline Phosphatase 35 - 120 U/L 67    Albumin 3 5 - 4 8 g/dL 3 7    Bilirubin, Total 0 2 - 1 0 mg/dL 0 2    Comment: Use of this assay is not recommended for patients undergoing treatment with eltrombopag due to the potential for falsely elevated results     Protein, Total 6 3 - 8 3 g/dL 6 7    AST <41 U/L 12    ALT <56 U/L 18    Anion Gap 3 - 11 7    eGFRcr >59 72    eGFRcr Comment  Interpretive information: calculated GFR          Suicide/Homicide Risk Assessment:    Risk of Harm to Self:  Demographic risk factors include: age: over 48 or older  Historical Risk Factors include: chronic psychiatric problems, chronic anxiety symptoms  Recent Specific Risk Factors include: diagnosis of depression, current depressive symptoms, current anxiety symptoms  Protective Factors: no current suicidal ideation, being a parent, being , compliant with medications, compliant with mental health treatment, connection to own children, responsibilities and duties to others, stable living environment, supportive family  Weapons: none  The following steps have been taken to ensure weapons are properly secured: not applicable  Based on today's assessment, Maira Echols presents the following risk of harm to self: low    Risk of Harm to Others: The following ratings are based on assessment at the time of the interview  Based on today's assessment, Maira Echols presents the following risk of harm to others: none    The following interventions are recommended: no intervention changes needed    Assessment/Plan:       Diagnoses and all orders for this visit:    Major depressive disorder, recurrent severe without psychotic features ([facility] )  -     PARoxetine (PAXIL) 40 MG tablet; Take 1 tablet (40 mg total) by mouth every evening  -     ARIPiprazole (ABILIFY) 5 mg tablet; Take 1 tablet (5 mg total) by mouth every evening    JOSE ROBERTO (generalized anxiety disorder)  -     ALPRAZolam (XANAX) 0 5 mg tablet; Take 1 tablet (0 5 mg total) by mouth 3 (three) times a day as needed for anxiety To be filled on or after 5/6/22  -     PARoxetine (PAXIL) 40 MG tablet;  Take 1 tablet (40 mg total) by mouth every evening    Long-term use of high-risk medication          Treatment Recommendations/Precautions:    Continue Paxil 40 mg every evening to improve depressive symptoms  Continue Abilify 5 mg every evening to augment antidepressant  Continue Xanax 0 5 mg three times a day as needed to improve anxiety symptoms  Medication management every 3 months  Does not want to see a therapist  Follows with family physician for glucose and lipid monitoring due to current therapy with antipsychotic medication  Follows with family physician for yearly physical exam, asthma, arthritis and hyperlipidemia  Aware of 24 hour and weekend coverage for urgent situations accessed by calling [facility] main practice number  Does not want any medication changes  Monitor lipid profile and hemoglobin A1C yearly due to current therapy with antipsychotic medication    Medications Risks/Benefits      Risks, Benefits And Possible Side Effects Of Medications:    Risks, benefits, and possible side effects of medications explained to Adria Grimaldo including risk of parkinsonian symptoms, Tardive Dyskinesia and metabolic syndrome related to treatment with antipsychotic medications, risk of suicidality and serotonin syndrome related to treatment with antidepressants and risks of misuse, abuse or dependence, sedation and respiratory depression related to treatment with benzodiazepine medications  She verbalizes understanding and agreement for treatment  Controlled Medication Discussion:     Adria Grimaldo has been filling controlled prescriptions on time as prescribed according to [facility]  Discussed with Adria Grimaldo the risks of sedation, respiratory depression, impairment of ability to drive and potential for abuse and addiction related to treatment with benzodiazepine medications  She understands risk of treatment with benzodiazepine medications, agrees to not drive if feels impaired and agrees to take medications as prescribed    Psychotherapy Provided:     Individual psychotherapy provided: Yes  Counseling was provided during the session today for 16 minutes  Medications, treatment progress and treatment plan reviewed with Adria Grimaldo  Goals discussed during in session: improve control of anxiety and help with depression  Discussed with Adria Grimaldo coping with son's illness and ongoing anxiety  Coping techniques including exercising, spending time with family and taking walks reviewed with Adria Grimaldo  Supportive therapy provided        Treatment Plan:    Completed and signed during the session: Yes - with Jason    Note Share: This note was shared with patient      Opal Bonilla MD 04/26/22

## 2022-04-26 ENCOUNTER — OFFICE VISIT (OUTPATIENT)
Dept: PSYCHIATRY | Facility: CLINIC | Age: 62
End: 2022-04-26
Payer: COMMERCIAL

## 2022-04-26 VITALS
HEIGHT: 63 IN | DIASTOLIC BLOOD PRESSURE: 84 MMHG | HEART RATE: 105 BPM | WEIGHT: 163 LBS | BODY MASS INDEX: 28.88 KG/M2 | SYSTOLIC BLOOD PRESSURE: 129 MMHG

## 2022-04-26 DIAGNOSIS — F41.1 GAD (GENERALIZED ANXIETY DISORDER): Chronic | ICD-10-CM

## 2022-04-26 DIAGNOSIS — F33.2 MAJOR DEPRESSIVE DISORDER, RECURRENT SEVERE WITHOUT PSYCHOTIC FEATURES (HCC): Primary | Chronic | ICD-10-CM

## 2022-04-26 DIAGNOSIS — Z79.899 LONG-TERM USE OF HIGH-RISK MEDICATION: Chronic | ICD-10-CM

## 2022-04-26 PROCEDURE — 99214 OFFICE O/P EST MOD 30 MIN: CPT | Performed by: PSYCHIATRY & NEUROLOGY

## 2022-04-26 PROCEDURE — 90833 PSYTX W PT W E/M 30 MIN: CPT | Performed by: PSYCHIATRY & NEUROLOGY

## 2022-04-26 RX ORDER — PAROXETINE HYDROCHLORIDE 40 MG/1
40 TABLET, FILM COATED ORAL EVERY EVENING
Qty: 30 TABLET | Refills: 4 | Status: SHIPPED | OUTPATIENT
Start: 2022-04-26 | End: 2022-09-23

## 2022-04-26 RX ORDER — ALPRAZOLAM 0.5 MG/1
0.5 TABLET ORAL 3 TIMES DAILY PRN
Qty: 90 TABLET | Refills: 4 | Status: SHIPPED | OUTPATIENT
Start: 2022-05-06 | End: 2022-10-03

## 2022-04-26 RX ORDER — ARIPIPRAZOLE 5 MG/1
5 TABLET ORAL EVERY EVENING
Qty: 30 TABLET | Refills: 4 | Status: SHIPPED | OUTPATIENT
Start: 2022-04-26 | End: 2022-09-23

## 2022-04-26 NOTE — BH TREATMENT PLAN
TREATMENT PLAN (Medication Management Only)        [facility]    Name/Date of Birth/MRN:  Kurt Oliva 64 y o  1960 MRN: [id number]  Date of Treatment Plan: April 26, 2022  Diagnosis/Diagnoses:   1  Major depressive disorder, recurrent severe without psychotic features ([facility] )    2  JOSE ROBERTO (generalized anxiety disorder)    3  Long-term use of high-risk medication      Strengths/Personal Resources for Self-Care: "I am not going to give up on getting better"  Area/Areas of need (in own words): "being more social, being more self-motivated, exercise"  1  Long Term Goal:   improve control of anxiety, improve control of depression  Target Date: 3 months - 7/26/2022  Person/Persons responsible for completion of goal: Harjit Bennett  2  Short Term Objective (s) - How will we reach this goal?:   A  Provider new recommended medication/dosage changes and/or continue medication(s): continue current medications as prescribed (Paxil, Abilify and Xanax)  B   N/A   C   N/A  Target Date: 3 months - 7/26/2022  Person/Persons Responsible for Completion of Goal: Harjit Bennett   Progress Towards Goals: moderate progress  Treatment Modality: medication management every 3 months  Review due 180 days from date of this plan: 6 months - 10/26/2022  Expected length of service: ongoing treatment unless revised  My Physician/PA/NP and I have developed this plan together and I agree to work on the goals and objectives  I understand the treatment goals that were developed for my treatment    Electronic Signatures: on file (unless signed below)    Chalo Sepulveda MD 04/26/22

## 2022-09-11 NOTE — PSYCH
MEDICATION MANAGEMENT NOTE        [place] [address] [facility]      Name and Date of Birth:  Rodrigo Schneider 58 y o  1960 MRN: [id number]    Date of Visit: 2022    Reason for Visit:   Chief Complaint   Patient presents with    Medication Management    Follow-up       SUBJECTIVE:    Kj Roberts is seen today for a follow up for Major Depressive Disorder and Generalized Anxiety Disorder  She continues to experience on and off symptoms since the last visit  She still feels depressed and at times overwhelmed with worries of the future of her son "school wants me to schedule some meetings with OVR, but he is still at school until he is 21"  Her nephew has been staying with her recently "he is also on spectrum and his mother moved to [place]  He was at school at [employer] and he did not want to move"  She feels anxious with that situation "I feel overwhelmed with that  I am overwhelmed with papers"  She denies any suicidal ideation, intent or plan at present; denies any homicidal ideation, intent or plan at present  She has no auditory hallucinations, denies any visual hallucinations, has no delusional thinking  She denies any side effects from current psychiatric medications      HPI ROS Appetite Changes and Sleep:     She reports normal sleep, adequate number of sleep hours (6 hours), normal appetite, recent weight loss (3 lbs) - intentional, fluctuating energy levels    Current Rating Scores:     Current PHQ-9   PHQ-2/9 Depression Screening    Little interest or pleasure in doing things: 1 - several days  Feeling down, depressed, or hopeless: 1 - several days  Trouble falling or staying asleep, or sleeping too much: 0 - not at all  Feeling tired or having little energy: 1 - several days  Poor appetite or overeatin - not at all  Feeling bad about yourself - or that you are a failure or have let yourself or your family down: 2 - more than half the days  Trouble concentrating on things, such as reading the newspaper or watching television: 3 - nearly every day  Moving or speaking so slowly that other people could have noticed  Or the opposite - being so fidgety or restless that you have been moving around a lot more than usual: 1 - several days  Thoughts that you would be better off dead, or of hurting yourself in some way: 0 - not at all  PHQ-9 Score: 9   PHQ-9 Interpretation: Mild depression        Current PHQ-9 score is decreased from 11 at the last visit)  Review Of Systems:      Constitutional recent weight loss (3 lbs) and fluctuating energy level   ENT negative   Cardiovascular negative   Respiratory negative   Gastrointestinal negative   Genitourinary negative   Musculoskeletal negative   Integumentary negative   Neurological negative   Endocrine negative   Other Symptoms none, all other systems are negative       Past Psychiatric History: (unchanged information from previous note copied and updated)    Past Inpatient Psychiatric Treatment:   No history of past inpatient psychiatric admissions  Past Outpatient Psychiatric Treatment:   In outpatient treatment at [address] [place] [address] E for many years    Past Suicide Attempts: no  Past Violent Behavior: no  Past Psychiatric Medication Trials: Paxil, Prozac, Zoloft, Effexor, Wellbutrin and Xanax    Traumatic History: (unchanged information from previous note copied and updated)    Abuse: no history of physical or sexual abuse  Other Traumatic Events: none     Past Medical History:    Past Medical History:   Diagnosis Date    Allergic rhinitis     Arthritis     Asthma     Atelectasis     Atopic rhinitis     Eczema     Hyperlipidemia     Low back pain     MVP (mitral valve prolapse)     Palpitations     Subconjunctival hemorrhage     Vitamin D deficiency         Past Surgical History:   Procedure Laterality Date    NO PAST SURGERIES       No Known Allergies    Substance Abuse History:    Social History     Substance and Sexual Activity   Alcohol Use Yes    Comment: Social alcohol use     Social History     Substance and Sexual Activity   Drug Use No       Social History:    Social History     Socioeconomic History    Marital status: /Civil Union     Spouse name: Not on file    Number of children: 1    Years of education: bachelor's degree     Highest education level: Bachelor's degree (e g , BA, AB, BS)   Occupational History    Occupation: On disability; worked in office management   Tobacco Use    Smoking status: Never Smoker    Smokeless tobacco: Never Used   Vaping Use    Vaping Use: Never used   Substance and Sexual Activity    Alcohol use: Yes     Comment: Social alcohol use    Drug use: No    Sexual activity: Yes     Partners: Male   Other Topics Concern    Not on file   Social History Narrative    Education: bachelor's degree in communications    Learning Disabilities: none    Marital History:     Children: 1 adoptive son     Living Arrangement: lives in home with , son and nephew    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, on permanent disability    Functioning Relationships:  is supportive    Legal History: none     History: None     Social Determinants of Health     Financial Resource Strain: Low Risk     Difficulty of Paying Living Expenses: Not hard at all   Food Insecurity: No Food Insecurity    Worried About [facility] in the Last Year: Never true   [address] in the Last Year: Never true   Transportation Needs: No Transportation Needs    Lack of Transportation (Medical): No    Lack of Transportation (Non-Medical): No   Physical Activity: Sufficiently Active    Days of Exercise per Week: 3 days    Minutes of Exercise per Session: 50 min   Stress: Stress Concern Present    Feeling of Stress : To some extent   Social Connections:  Moderately Isolated    Frequency of Communication with Friends and Family: Never    Frequency of Social Gatherings with Friends and Family: Never    Attends Scientologist Services: More than 4 times per year    Active Member of [employer] or Organizations: No    Attends Club or Organization Meetings: Never    Marital Status:    Intimate Partner Violence: Not At Risk    Fear of Current or Ex-Partner: No    Emotionally Abused: No    Physically Abused: No    Sexually Abused: No   Housing Stability: Low Risk     Unable to Pay for Housing in the Last Year: No    Number of [place] in the Last Year: 1    Unstable Housing in the Last Year: No       Family Psychiatric History:     Family History   Problem Relation Age of Onset    Depression Maternal Grandmother     Bipolar disorder Paternal Aunt     Substance Abuse Neg Hx     Suicide Attempts Neg Hx        History Review:  The following portions of the patient's history were reviewed and updated as appropriate: allergies, current medications, past family history, past medical history, past social history, past surgical history and problem list          OBJECTIVE:     Vital signs in last 24 hours:    Vitals:    09/16/22 1511   BP: 115/74   Pulse: 77   Weight: 72 6 kg (160 lb)   Height: 5' 3" (1 6 m)       Mental Status Evaluation:    Appearance age appropriate, casually dressed   Behavior cooperative, appears anxious   Speech normal rate, normal volume, normal pitch   Mood depressed, anxious   Affect constricted   Thought Processes organized, goal directed   Associations intact associations   Thought Content no overt delusions   Perceptual Disturbances: no auditory hallucinations, no visual hallucinations   Abnormal Thoughts  Risk Potential Suicidal ideation - None  Homicidal ideation - None  Potential for aggression - No   Orientation oriented to person, place, time/date and situation   Memory recent and remote memory grossly intact   Consciousness alert and awake   Attention Span Concentration Span decreased attention span  decreased concentration   Intellect appears to be of average intelligence   Insight intact   Judgement intact   Muscle Strength and  Gait normal muscle strength and normal muscle tone, normal gait and normal balance   Motor activity no abnormal movements   Language no difficulty naming common objects, no difficulty repeating a phrase, no difficulty writing a sentence   Fund of Knowledge adequate knowledge of current events  adequate fund of knowledge regarding past history  adequate fund of knowledge regarding vocabulary    Pain none   Pain Scale 0       Laboratory Results: I have personally reviewed all pertinent laboratory/tests results    Recent Labs (last 12 months):   No visits with results within 12 Month(s) from this visit     Latest known visit with results is:   Lab on 03/19/2021   Component Date Value    WBC 03/19/2021 5 41     RBC 03/19/2021 5 11     Hemoglobin 03/19/2021 13 4     Hematocrit 03/19/2021 43 0     MCV 03/19/2021 84     MCH 03/19/2021 26 2 (A)    MCHC 03/19/2021 31 2 (A)    RDW 03/19/2021 13 9     MPV 03/19/2021 9 7     Platelets 87/94/1874 241     nRBC 03/19/2021 0     Neutrophils Relative 03/19/2021 59     Immat GRANS % 03/19/2021 0     Lymphocytes Relative 03/19/2021 28     Monocytes Relative 03/19/2021 9     Eosinophils Relative 03/19/2021 3     Basophils Relative 03/19/2021 1     Neutrophils Absolute 03/19/2021 3 21     Immature Grans Absolute 03/19/2021 0 02     Lymphocytes Absolute 03/19/2021 1 53     Monocytes Absolute 03/19/2021 0 48     Eosinophils Absolute 03/19/2021 0 14     Basophils Absolute 03/19/2021 0 03     Sodium 03/19/2021 138     Potassium 03/19/2021 3 7     Chloride 03/19/2021 102     CO2 03/19/2021 32     ANION GAP 03/19/2021 4     BUN 03/19/2021 10     Creatinine 03/19/2021 1 00     Glucose, Fasting 03/19/2021 86     Calcium 03/19/2021 9 2     AST 03/19/2021 10     ALT 03/19/2021 29     Alkaline Phosphatase 03/19/2021 81     Total Protein 03/19/2021 7 6     Albumin 03/19/2021 3 9     Total Bilirubin 03/19/2021 0 40     eGFR 03/19/2021 71     Cholesterol 03/19/2021 229 (A)    Triglycerides 03/19/2021 70     HDL, Direct 03/19/2021 60     LDL Calculated 03/19/2021 155 (A)    Non-HDL-Chol (CHOL-HDL) 03/19/2021 169     Hemoglobin A1C 03/19/2021 5 5     EAG 03/19/2021 111     LIPID PANEL  Order: [id number]   Ref Range & Units 4/19/22  9:40 AM   Cholesterol <200 mg/dL 218 High     Triglyceride <150 mg/dL 64    Cholesterol, HDL, Direct >40 mg/dL 65    Cholesterol, Non-HDL <160 mg/dL 153    Comment: Note: For NCEP interpretive guidelines please refer to the Laboratory Handbook  Cholesterol, LDL, Calculated <130 mg/dL 140 High     Comment: LDL Cholesterol was calculated using the Friedewald equation  Direct measurement of LDL is not indicated for this patient based on [facility]'s analytical algorithm for measurement of LDL Cholesterol  CHOL/HDL Ratio  3 35      COMPREHENSIVE METABOLIC PANEL  Order: [id number]   Ref Range & Units 4/19/22  9:40 AM   Glucose 65 - 99 mg/dL 86    BUN 7 - 25 mg/dL 8    Creatinine 0 40 - 1 10 mg/dL 0 91    Sodium 135 - 145 mmol/L 144    Potassium 3 5 - 5 2 mmol/L 4 2    Chloride 100 - 109 mmol/L 108    Carbon Dioxide 23 - 31 mmol/L 29    Calcium 8 5 - 10 1 mg/dL 9 3    Alkaline Phosphatase 35 - 120 U/L 67    Albumin 3 5 - 4 8 g/dL 3 7    Bilirubin, Total 0 2 - 1 0 mg/dL 0 2    Comment: Use of this assay is not recommended for patients undergoing treatment with eltrombopag due to the potential for falsely elevated results     Protein, Total 6 3 - 8 3 g/dL 6 7    AST <41 U/L 12    ALT <56 U/L 18    Anion Gap 3 - 11 7    eGFRcr >59 72    eGFRcr Comment  Interpretive information: calculated GFR           Suicide/Homicide Risk Assessment:    Risk of Harm to Self:  Demographic risk factors include: age: over 48 or older  Historical Risk Factors include: chronic depressive symptoms, chronic anxiety symptoms  Recent Specific Risk Factors include: diagnosis of depression, current depressive symptoms, current anxiety symptoms  Protective Factors: no current suicidal ideation, being a parent, being , compliant with medications, compliant with mental health treatment, connection to own children, responsibilities and duties to others, stable living environment, supportive family  Weapons: none  The following steps have been taken to ensure weapons are properly secured: not applicable  Based on today's assessment, Oren Mendosa presents the following risk of harm to self: low    Risk of Harm to Others: The following ratings are based on assessment at the time of the interview  Based on today's assessment, Oren Mendosa presents the following risk of harm to others: none    The following interventions are recommended: no intervention changes needed    Assessment/Plan:       Diagnoses and all orders for this visit:    Major depressive disorder, recurrent severe without psychotic features ([facility] )  -     PARoxetine (PAXIL) 40 MG tablet; Take 1 tablet (40 mg total) by mouth every evening  -     ARIPiprazole (ABILIFY) 5 mg tablet; Take 1 tablet (5 mg total) by mouth every evening    JOSE ROBERTO (generalized anxiety disorder)  -     ALPRAZolam (XANAX) 0 5 mg tablet; Take 1 tablet (0 5 mg total) by mouth 3 (three) times a day as needed for anxiety  -     PARoxetine (PAXIL) 40 MG tablet;  Take 1 tablet (40 mg total) by mouth every evening    Long-term use of high-risk medication          Treatment Recommendations/Precautions:    Continue Paxil 40 mg every evening to improve depressive symptoms  Continue Abilify 5 mg every evening to augment antidepressant (she has not been taking Abilify recently, but wants to restart  Continue Xanax 0 5 mg three times a day as needed to improve anxiety symptoms  Medication management every 3 months  Follows with family physician for glucose and lipid monitoring due to current therapy with antipsychotic medication  Follows with family physician for yearly physical exam, asthma, arthritis and hyperlipidemia  Aware of 24 hour and weekend coverage for urgent situations accessed by calling [facility] main practice number  Monitor lipid profile and hemoglobin A1C yearly due to current therapy with antipsychotic medication - gets labs done with PCP    Medications Risks/Benefits      Risks, Benefits And Possible Side Effects Of Medications:    Risks, benefits, and possible side effects of medications explained to Jason including risk of parkinsonian symptoms, Tardive Dyskinesia and metabolic syndrome related to treatment with antipsychotic medications, risk of suicidality and serotonin syndrome related to treatment with antidepressants and risks of misuse, abuse or dependence, sedation and respiratory depression related to treatment with benzodiazepine medications  She verbalizes understanding and agreement for treatment  Controlled Medication Discussion:     Jason has been filling controlled prescriptions on time as prescribed according to [facility] [address]  Discussed with Jason the risks of sedation, respiratory depression, impairment of ability to drive and potential for abuse and addiction related to treatment with benzodiazepine medications  She understands risk of treatment with benzodiazepine medications, agrees to not drive if feels impaired and agrees to take medications as prescribed    Psychotherapy Provided:     Individual psychotherapy provided: Yes  Counseling was provided during the session today for 16 minutes  Medications, treatment progress and treatment plan reviewed with Jason  Goals discussed during in session: improve control of anxiety and maintain control of depression  Discussed with Jason coping with family issues and son's illness     Coping strategies including exercising, doreen, organizing tasks at home and spending time with family reviewed with Gilda Sarabia  Supportive therapy provided  Treatment Plan:    Completed and signed during the session: Yes - with Gilda Sarabia    Note Share: This note was shared with patient      Visit start and stop times:    Start Time: 3:05 PM  Stop Time: 3:35 PM    I spent 30 minutes directly with the patient during this visit    Adrianna Jeter MD 09/16/22

## 2022-09-16 ENCOUNTER — OFFICE VISIT (OUTPATIENT)
Dept: PSYCHIATRY | Facility: CLINIC | Age: 62
End: 2022-09-16
Payer: COMMERCIAL

## 2022-09-16 ENCOUNTER — TELEPHONE (OUTPATIENT)
Dept: PSYCHIATRY | Facility: CLINIC | Age: 62
End: 2022-09-16

## 2022-09-16 VITALS
HEIGHT: 63 IN | BODY MASS INDEX: 28.35 KG/M2 | WEIGHT: 160 LBS | SYSTOLIC BLOOD PRESSURE: 115 MMHG | DIASTOLIC BLOOD PRESSURE: 74 MMHG | HEART RATE: 77 BPM

## 2022-09-16 DIAGNOSIS — F33.2 MAJOR DEPRESSIVE DISORDER, RECURRENT SEVERE WITHOUT PSYCHOTIC FEATURES (HCC): Primary | Chronic | ICD-10-CM

## 2022-09-16 DIAGNOSIS — Z79.899 LONG-TERM USE OF HIGH-RISK MEDICATION: Chronic | ICD-10-CM

## 2022-09-16 DIAGNOSIS — F41.1 GAD (GENERALIZED ANXIETY DISORDER): Chronic | ICD-10-CM

## 2022-09-16 PROCEDURE — 90833 PSYTX W PT W E/M 30 MIN: CPT | Performed by: PSYCHIATRY & NEUROLOGY

## 2022-09-16 PROCEDURE — 99214 OFFICE O/P EST MOD 30 MIN: CPT | Performed by: PSYCHIATRY & NEUROLOGY

## 2022-09-16 RX ORDER — ALPRAZOLAM 0.5 MG/1
0.5 TABLET ORAL 3 TIMES DAILY PRN
Qty: 90 TABLET | Refills: 4 | Status: SHIPPED | OUTPATIENT
Start: 2022-09-16 | End: 2023-02-13

## 2022-09-16 RX ORDER — PAROXETINE HYDROCHLORIDE 40 MG/1
40 TABLET, FILM COATED ORAL EVERY EVENING
Qty: 30 TABLET | Refills: 4 | Status: SHIPPED | OUTPATIENT
Start: 2022-09-16 | End: 2023-02-13

## 2022-09-16 RX ORDER — ARIPIPRAZOLE 5 MG/1
5 TABLET ORAL EVERY EVENING
Qty: 30 TABLET | Refills: 4 | Status: SHIPPED | OUTPATIENT
Start: 2022-09-16 | End: 2023-02-13

## 2022-09-16 NOTE — BH TREATMENT PLAN
TREATMENT PLAN (Medication Management Only)        [facility]    Name/Date of Birth/MRN:  Ronny Rubio 58 y o  1960 MRN: [id number]  Date of Treatment Plan: September 16, 2022  Diagnosis/Diagnoses:   1  Major depressive disorder, recurrent severe without psychotic features ([facility] [address] )    2  JOSE ROBERTO (generalized anxiety disorder)    3  Long-term use of high-risk medication      Strengths/Personal Resources for Self-Care: "my doreen and sense of hope"  Area/Areas of need (in own words): "organizing my life"  1  Long Term Goal:   alleviate anxiety, improve control of depression  Target Date: 3 months - 12/16/2022  Person/Persons responsible for completion of goal: Eri Ty  2  Short Term Objective (s) - How will we reach this goal?:   A  Provider new recommended medication/dosage changes and/or continue medication(s): continue current medications as prescribed (Paxil, Abilify and Xanax)  B   N/A   C   N/A  Target Date: 3 months - 12/16/2022  Person/Persons Responsible for Completion of Goal: Eri Ty   Progress Towards Goals: moderate progress  Treatment Modality: medication management every 3 months  Review due 180 days from date of this plan: 6 months - 3/16/2023  Expected length of service: ongoing treatment unless revised  My Physician/PA/NP and I have developed this plan together and I agree to work on the goals and objectives  I understand the treatment goals that were developed for my treatment    Electronic Signatures: on file (unless signed below)    Marion Smith MD 09/16/22

## 2023-01-25 NOTE — PSYCH
MEDICATION MANAGEMENT NOTE        [address]      Name and Date of Birth:  Corinne Piccolo 58 y o  1960 MRN: [id number]    Date of Visit: 2023    Reason for Visit:   Chief Complaint   Patient presents with   • Medication Management   • Follow-up       SUBJECTIVE:    Medhat Arnold is seen today for a follow up for Major Depressive Disorder and Generalized Anxiety Disorder  She continues to experience on and off symptoms since the last visit  She still feels depressed, rates mood as 9 on a scale of 1 (best mood) to 10 (worst mood)  She continues to experience significant anxiety symptoms  She continues to struggle with taking care of her son with autism and has been looking for life skills therapy for him to help him with transition to adult life  She denies any suicidal ideation, intent or plan at present; denies any homicidal ideation, intent or plan at present  She has no auditory hallucinations, denies any visual hallucinations, has no delusional thinking  She denies any side effects from current psychiatric medications   She has not been taking Abilify "I try to get away from taking too many medications" - but states that she will try to restart it "I think I am going to try"    HPI ROS Appetite Changes and Sleep:     She reports normal sleep, adequate number of sleep hours (7 hours), normal appetite, recent weight gain (4 lbs), low energy    Current Rating Scores:     Current PHQ-9   PHQ-2/9 Depression Screening    Little interest or pleasure in doing things: 3 - nearly every day  Feeling down, depressed, or hopeless: 3 - nearly every day  Trouble falling or staying asleep, or sleeping too much: 3 - nearly every day  Feeling tired or having little energy: 3 - nearly every day  Poor appetite or overeatin - not at all  Feeling bad about yourself - or that you are a failure or have let yourself or your family down: 3 - nearly every day  Trouble concentrating on things, such as reading the newspaper or watching television: 3 - nearly every day  Moving or speaking so slowly that other people could have noticed  Or the opposite - being so fidgety or restless that you have been moving around a lot more than usual: 0 - not at all  Thoughts that you would be better off dead, or of hurting yourself in some way: 0 - not at all  PHQ-9 Score: 18   PHQ-9 Interpretation: Moderately severe depression        Current PHQ-9 score is increased from 11 at the last visit)  Review Of Systems:      Constitutional low energy and recent weight gain (4 lbs)   ENT negative   Cardiovascular negative   Respiratory negative   Gastrointestinal negative   Genitourinary negative   Musculoskeletal neck pain   Integumentary negative   Neurological negative   Endocrine negative   Other Symptoms none, all other systems are negative       Past Psychiatric History: (unchanged information from previous note copied and updated)    Past Inpatient Psychiatric Treatment:   No history of past inpatient psychiatric admissions  Past Outpatient Psychiatric Treatment:   In outpatient treatment at [address] [place] [address] E for many years    Past Suicide Attempts: no  Past Violent Behavior: no  Past Psychiatric Medication Trials: Paxil, Prozac, Zoloft, Effexor, Wellbutrin and Xanax    Traumatic History: (unchanged information from previous note copied and updated)    Abuse: no history of physical or sexual abuse  Other Traumatic Events: none     Past Medical History:    Past Medical History:   Diagnosis Date   • Allergic rhinitis    • Arthritis    • Asthma    • Atelectasis    • Atopic rhinitis    • Eczema    • Hyperlipidemia    • Low back pain    • MVP (mitral valve prolapse)    • Palpitations    • Subconjunctival hemorrhage    • Vitamin D deficiency         Past Surgical History:   Procedure Laterality Date   • NO PAST SURGERIES       No Known Allergies    Substance Abuse History:    Social History     Substance and Sexual Activity   Alcohol Use Yes    Comment: Social alcohol use     Social History     Substance and Sexual Activity   Drug Use No       Social History:    Social History     Socioeconomic History   • Marital status: /Civil Union     Spouse name: Not on file   • Number of children: 1   • Years of education: bachelor's degree    • Highest education level: Bachelor's degree (e g , BA, AB, BS)   Occupational History   • Occupation: On disability; worked in office management   Tobacco Use   • Smoking status: Never   • Smokeless tobacco: Never   Vaping Use   • Vaping Use: Never used   Substance and Sexual Activity   • Alcohol use: Yes     Comment: Social alcohol use   • Drug use: No   • Sexual activity: Yes     Partners: Male   Other Topics Concern   • Not on file   Social History Narrative    Education: bachelor's degree in communications    Learning Disabilities: none    Marital History:     Children: 1 adoptive son     Living Arrangement: lives in home with , son and nephew    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, on permanent disability    Functioning Relationships:  is supportive    Legal History: none     History: None     Social Determinants of Health     Financial Resource Strain: Low Risk    • Difficulty of Paying Living Expenses: Not hard at all   Food Insecurity: No Food Insecurity   • Worried About [address] in the Last Year: Never true   • [address] in the Last Year: Never true   Transportation Needs: No Transportation Needs   • Lack of Transportation (Medical): No   • Lack of Transportation (Non-Medical): No   Physical Activity: Sufficiently Active   • Days of Exercise per Week: 4 days   • Minutes of Exercise per Session: 50 min   Stress: Stress Concern Present   • Feeling of Stress : To some extent   Social Connections:  Moderately Isolated   • Frequency of Communication with Friends and Family: Never   • Frequency of Social Gatherings with Friends and Family: Never   • Attends Zoroastrian Services: More than 4 times per year   • Active Member of Clubs or Organizations: No   • Attends Club or Organization Meetings: Never   • Marital Status:    Intimate Partner Violence: Not At Risk   • Fear of Current or Ex-Partner: No   • Emotionally Abused: No   • Physically Abused: No   • Sexually Abused: No   Housing Stability: Low Risk    • Unable to Pay for Housing in the Last Year: No   • Number of Places Lived in the Last Year: 1   • Unstable Housing in the Last Year: No       Family Psychiatric History:     Family History   Problem Relation Age of Onset   • Depression Maternal Grandmother    • Bipolar disorder Paternal Aunt    • Substance Abuse Neg Hx    • Suicide Attempts Neg Hx        History Review:  The following portions of the patient's history were reviewed and updated as appropriate: allergies, current medications, past family history, past medical history, past social history, past surgical history and problem list          OBJECTIVE:     Vital signs in last 24 hours:    Vitals:    02/01/23 1511   BP: 115/75   Pulse: 75   Weight: 74 4 kg (164 lb)   Height: 5' 3" (1 6 m)       Mental Status Evaluation:    Appearance age appropriate, casually dressed   Behavior cooperative, appears anxious   Speech normal rate, normal volume, normal pitch   Mood depressed, anxious   Affect constricted   Thought Processes organized, goal directed   Associations intact associations   Thought Content no overt delusions   Perceptual Disturbances: no auditory hallucinations, no visual hallucinations   Abnormal Thoughts  Risk Potential Suicidal ideation - None  Homicidal ideation - None  Potential for aggression - No   Orientation oriented to person, place, time/date and situation   Memory recent and remote memory grossly intact   Consciousness alert and awake   Attention Span Concentration Span attention span and concentration appear shorter than expected for age   Intellect appears to be of average intelligence   Insight intact   Judgement intact   Muscle Strength and  Gait normal muscle strength and normal muscle tone, normal gait and normal balance   Motor activity no abnormal movements   Language no difficulty naming common objects, no difficulty repeating a phrase, no difficulty writing a sentence   Fund of Knowledge adequate knowledge of current events  adequate fund of knowledge regarding past history  adequate fund of knowledge regarding vocabulary    Pain moderate   Pain Scale 6       Laboratory Results: I have personally reviewed all pertinent laboratory/tests results    CBC AND DIFFERENTIAL  Order: [id number]   Ref Range & Units 4/19/22  9:40 AM   Hemoglobin 11 5 - 14 5 g/dL 12 5    Hematocrit 35 0 - 43 0 % 38 3    WBC 4 0 - 10 0 thou/cmm 4 1    RBC 3 70 - 4 70 mill/cmm 4 69    Platelet Count 191 - 350 thou/cmm 228    MPV 7 5 - 11 3 fL 8 4    MCV 80 - 100 fL 82    MCH 26 0 - 34 0 pg 26 6    MCHC 32 0 - 37 0 g/dL 32 6    RDW 12 0 - 16 0 % 14 5    Differential Type  AUTO    Absolute Neutrophils 1 8 - 7 8 thou/cmm 2 4    Absolute Lymphocytes 1 0 - 3 0 thou/cmm 1 2    Absolute Monocytes 0 3 - 1 0 thou/cmm 0 4    Absolute Eosinophils 0 0 - 0 5 thou/cmm 0 1    Absolute Basophils 0 0 - 0 1 thou/cmm 0 0    Neutrophils % 58    Lymphocytes % 29    Monocytes % 9    Eosinophils % 3    Basophils % 1      COMPREHENSIVE METABOLIC PANEL  Order: [id number]   Ref Range & Units 4/19/22  9:40 AM   Glucose 65 - 99 mg/dL 86    BUN 7 - 25 mg/dL 8    Creatinine 0 40 - 1 10 mg/dL 0 91    Sodium 135 - 145 mmol/L 144    Potassium 3 5 - 5 2 mmol/L 4 2    Chloride 100 - 109 mmol/L 108    Carbon Dioxide 23 - 31 mmol/L 29    Calcium 8 5 - 10 1 mg/dL 9 3    Alkaline Phosphatase 35 - 120 U/L 67    Albumin 3 5 - 4 8 g/dL 3 7    Bilirubin, Total 0 2 - 1 0 mg/dL 0 2    Comment: Use of this assay is not recommended for patients undergoing treatment with eltrombopag due to the potential for falsely elevated results  Protein, Total 6 3 - 8 3 g/dL 6 7    AST <41 U/L 12    ALT <56 U/L 18    Anion Gap 3 - 11 7    eGFRcr >59 72    eGFRcr Comment  Interpretive information: calculated GFR      LIPID PANEL  Order: [id number]   Ref Range & Units 4/19/22  9:40 AM   Cholesterol <200 mg/dL 218 High     Triglyceride <150 mg/dL 64    Cholesterol, HDL, Direct >40 mg/dL 65    Cholesterol, Non-HDL <160 mg/dL 153    Comment: Note: For NCEP interpretive guidelines please refer to the Laboratory Handbook  Cholesterol, LDL, Calculated <130 mg/dL 140 High     Comment: LDL Cholesterol was calculated using the Friedewald equation  Direct measurement of LDL is not indicated for this patient based on \[facility]\""'s analytical algorithm for measurement of LDL Cholesterol  CHOL/HDL Ratio  3 35        Suicide/Homicide Risk Assessment:    Risk of Harm to Self:  Demographic risk factors include: age: over 48 or older  Historical Risk Factors include: chronic depressive symptoms, chronic anxiety symptoms  Recent Specific Risk Factors include: diagnosis of depression, current depressive symptoms, current anxiety symptoms  Protective Factors: no current suicidal ideation, being a parent, being , compliant with medications, compliant with mental health treatment, connection to own children, responsibilities and duties to others, stable living environment, supportive family  Weapons: none  The following steps have been taken to ensure weapons are properly secured: not applicable  Based on today's assessment, Julia Iron presents the following risk of harm to self: low    Risk of Harm to Others:   The following ratings are based on assessment at the time of the interview  Based on today's assessment, Julia Iron presents the following risk of harm to others: none    The following interventions are recommended: no intervention changes needed    Assessment/Plan:       Diagnoses and all orders for this visit:    Major depressive disorder, recurrent severe without psychotic features ([facility] [address] )  -     PARoxetine (PAXIL) 40 MG tablet; Take 1 tablet (40 mg total) by mouth every evening  -     ARIPiprazole (ABILIFY) 5 mg tablet; Take 1 tablet (5 mg total) by mouth every evening    JOSE ROBERTO (generalized anxiety disorder)  -     PARoxetine (PAXIL) 40 MG tablet; Take 1 tablet (40 mg total) by mouth every evening  -     ALPRAZolam (XANAX) 0 5 mg tablet; Take 1 tablet (0 5 mg total) by mouth 3 (three) times a day as needed for anxiety    Long-term use of high-risk medication    Other orders  -     amoxicillin (AMOXIL) 500 mg capsule;  Take 2,000 mg by mouth as needed 1 hour prior to dental appointment (Patient not taking: Reported on 2/1/2023)          Treatment Recommendations/Precautions:    Continue Paxil 40 mg every evening to improve depressive symptoms  Restart Abilify 5 mg every evening to augment antidepressant  Continue Xanax 0 5 mg three times a day as needed to improve anxiety symptoms  Medication management every 3 months  Referral for individual psychotherapy  Follows with family physician for glucose and lipid monitoring due to current therapy with antipsychotic medication  Follows with family physician for yearly physical exam, asthma, arthritis and hyperlipidemia  Aware of 24 hour and weekend coverage for urgent situations accessed by calling [facility] main practice number  Monitor hemoglobin A1C and fasting glucose yearly due to current therapy with antipsychotic medication - gets labs done with PCP    Medications Risks/Benefits      Risks, Benefits And Possible Side Effects Of Medications:    Risks, benefits, and possible side effects of medications explained to Adele Goldmann including risk of parkinsonian symptoms, Tardive Dyskinesia and metabolic syndrome related to treatment with antipsychotic medications, risk of suicidality and serotonin syndrome related to treatment with antidepressants and risks of misuse, abuse or dependence, sedation and respiratory depression related to treatment with benzodiazepine medications  She verbalizes understanding and agreement for treatment  Controlled Medication Discussion:     Justine Nam has been filling controlled prescriptions on time as prescribed according to [address]  Discussed with Justine Browning the risks of sedation, respiratory depression, impairment of ability to drive and potential for abuse and addiction related to treatment with benzodiazepine medications  She understands risk of treatment with benzodiazepine medications, agrees to not drive if feels impaired and agrees to take medications as prescribed    Psychotherapy Provided:     Individual psychotherapy provided: Yes  Counseling was provided during the session today for 16 minutes  Medications, treatment progress and treatment plan reviewed with Justine Browning  Medication changes discussed with Justine Browning  Medication education provided to Justine Browning  Goals discussed during in session: improve control of anxiety and improve control of depression  Discussed with Justine Browning coping with marital problems, son's illness, ongoing anxiety and chronic mental illness  Coping mechanisms including playing with dog, starting therapy and taking walks reviewed with Justine Browning  Supportive therapy provided  Treatment Plan:    Completed and signed during the session: Yes - with Justine Browning    Note Share: This note was shared with patient      Visit Time    Visit Start Time: 3:05 PM  Visit Stop Time: 3:36 PM  Total Visit Duration: 31 minutes    Addie Krishnamurthy MD 02/01/23

## 2023-02-01 ENCOUNTER — TELEPHONE (OUTPATIENT)
Dept: PSYCHIATRY | Facility: CLINIC | Age: 63
End: 2023-02-01

## 2023-02-01 ENCOUNTER — OFFICE VISIT (OUTPATIENT)
Dept: PSYCHIATRY | Facility: CLINIC | Age: 63
End: 2023-02-01

## 2023-02-01 VITALS
DIASTOLIC BLOOD PRESSURE: 75 MMHG | HEIGHT: 63 IN | HEART RATE: 75 BPM | BODY MASS INDEX: 29.06 KG/M2 | WEIGHT: 164 LBS | SYSTOLIC BLOOD PRESSURE: 115 MMHG

## 2023-02-01 DIAGNOSIS — F33.2 MAJOR DEPRESSIVE DISORDER, RECURRENT SEVERE WITHOUT PSYCHOTIC FEATURES (HCC): Primary | Chronic | ICD-10-CM

## 2023-02-01 DIAGNOSIS — F41.1 GAD (GENERALIZED ANXIETY DISORDER): Chronic | ICD-10-CM

## 2023-02-01 DIAGNOSIS — Z79.899 LONG-TERM USE OF HIGH-RISK MEDICATION: Chronic | ICD-10-CM

## 2023-02-01 RX ORDER — ARIPIPRAZOLE 5 MG/1
5 TABLET ORAL EVERY EVENING
Qty: 90 TABLET | Refills: 2 | Status: SHIPPED | OUTPATIENT
Start: 2023-02-01 | End: 2023-10-29

## 2023-02-01 RX ORDER — ALPRAZOLAM 0.5 MG/1
0.5 TABLET ORAL 3 TIMES DAILY PRN
Qty: 90 TABLET | Refills: 4 | Status: SHIPPED | OUTPATIENT
Start: 2023-02-01 | End: 2023-07-01

## 2023-02-01 RX ORDER — AMOXICILLIN 500 MG/1
2000 CAPSULE ORAL AS NEEDED
COMMUNITY
Start: 2022-12-05

## 2023-02-01 RX ORDER — PAROXETINE HYDROCHLORIDE 40 MG/1
40 TABLET, FILM COATED ORAL EVERY EVENING
Qty: 90 TABLET | Refills: 2 | Status: SHIPPED | OUTPATIENT
Start: 2023-02-01 | End: 2023-10-29

## 2023-02-01 NOTE — BH TREATMENT PLAN
TREATMENT PLAN (Medication Management Only)        [facility]    Name/Date of Birth/MRN:  Corinne Piccolo 58 y o  1960 MRN: [id number]  Date of Treatment Plan: February 1, 2023  Diagnosis/Diagnoses:   1  Major depressive disorder, recurrent severe without psychotic features ([facility] [address] )    2  JOSE ROBERTO (generalized anxiety disorder)    3  Long-term use of high-risk medication      Strengths/Personal Resources for Self-Care: "that I am not going to give up"  Area/Areas of need (in own words): "organization and concentration"  1  Long Term Goal:   alleviate anxiety, improve control of depression  Target Date: 3 months - 5/1/2023  Person/Persons responsible for completion of goal: Medhat Arnold  2  Short Term Objective (s) - How will we reach this goal?:   A  Provider new recommended medication/dosage changes and/or continue medication(s): restart Abilify, continue all other medications (Paxil and Xanax)  B   N/A   C   N/A  Target Date: 3 months - 5/1/2023  Person/Persons Responsible for Completion of Goal: Medhat Arnold   Progress Towards Goals: limited progress  Treatment Modality: medication management every 3 months, referral for individual psychotherapy  Review due 180 days from date of this plan: 6 months - 8/1/2023  Expected length of service: ongoing treatment unless revised  My Physician/PA/NP and I have developed this plan together and I agree to work on the goals and objectives  I understand the treatment goals that were developed for my treatment    Electronic Signatures: on file (unless signed below)    Miguel Mclaughlin MD 02/01/23

## 2023-05-08 ENCOUNTER — TELEPHONE (OUTPATIENT)
Dept: PSYCHIATRY | Facility: CLINIC | Age: 63
End: 2023-05-08

## 2023-05-08 NOTE — TELEPHONE ENCOUNTER
Attempted to return patient's voicemail in regards to verifying an appointment for today  Writer could not leave voice mail patient mailbox was full

## 2023-05-09 ENCOUNTER — TELEPHONE (OUTPATIENT)
Dept: PSYCHIATRY | Facility: CLINIC | Age: 63
End: 2023-05-09

## 2023-05-09 NOTE — TELEPHONE ENCOUNTER
NO-SHOW LETTER MAILED TO Gilberto Settler    ADDRESS: [address] [place] [address],[address] [id number]

## 2023-05-15 ENCOUNTER — TELEPHONE (OUTPATIENT)
Dept: PSYCHIATRY | Facility: CLINIC | Age: 63
End: 2023-05-15

## 2023-05-15 NOTE — TELEPHONE ENCOUNTER
Patient contacted the office to schedule a follow up visit with provider  Patient is now scheduled for 11/6/2023  at 3:30pm in office

## 2023-05-26 ENCOUNTER — TELEPHONE (OUTPATIENT)
Dept: PSYCHIATRY | Facility: CLINIC | Age: 63
End: 2023-05-26

## 2023-06-17 ENCOUNTER — APPOINTMENT (OUTPATIENT)
Dept: RADIOLOGY | Age: 63
End: 2023-06-17
Payer: COMMERCIAL

## 2023-06-17 ENCOUNTER — OFFICE VISIT (OUTPATIENT)
Dept: URGENT CARE | Age: 63
End: 2023-06-17
Payer: COMMERCIAL

## 2023-06-17 VITALS
TEMPERATURE: 97.7 F | HEART RATE: 74 BPM | RESPIRATION RATE: 18 BRPM | DIASTOLIC BLOOD PRESSURE: 75 MMHG | SYSTOLIC BLOOD PRESSURE: 128 MMHG | OXYGEN SATURATION: 99 %

## 2023-06-17 DIAGNOSIS — S99.921A RIGHT FOOT INJURY, INITIAL ENCOUNTER: ICD-10-CM

## 2023-06-17 DIAGNOSIS — S99.921A RIGHT FOOT INJURY, INITIAL ENCOUNTER: Primary | ICD-10-CM

## 2023-06-17 PROCEDURE — 99213 OFFICE O/P EST LOW 20 MIN: CPT | Performed by: NURSE PRACTITIONER

## 2023-06-17 PROCEDURE — 73630 X-RAY EXAM OF FOOT: CPT

## 2023-06-17 NOTE — PROGRESS NOTES
[facility]        NAME: Orin Rodrigues is a 58 y o  female  : 1960    MRN: [id number]  DATE: 2023  TIME: 3:35 PM    Assessment and Plan   Right foot injury, initial encounter [K62 [id number]]  1  Right foot injury, initial encounter  XR foot 3+ vw right            Patient Instructions     Motrin OTC prn for pain  Follow up with PCP in 3-5 days  Proceed to  ER if symptoms worsen  Chief Complaint     Chief Complaint   Patient presents with   • Fall     Patient states almost 2 weeks ago she fell and injured her right foot  She has pain on the bottom of  the foot and her 3rd and 4th metatarsal           History of Present Illness       HPI   Reports she was getting up at home fast and accidentally fell, injury the toes of the right foot  Has been in pain since  This was 1 5 weeks ago  Review of Systems   Review of Systems   Musculoskeletal: Positive for arthralgias (toes of the right foot) and gait problem (d/t pain)  Skin: Negative for wound  Neurological: Negative for numbness           Current Medications       Current Outpatient Medications:   •  albuterol (PROVENTIL HFA,VENTOLIN HFA) 90 mcg/act inhaler, Inhale 2 puffs every 4 (four) hours as needed, Disp: , Rfl:   •  ALPRAZolam (XANAX) 0 5 mg tablet, Take 1 tablet (0 5 mg total) by mouth 3 (three) times a day as needed for anxiety, Disp: 90 tablet, Rfl: 4  •  ARIPiprazole (ABILIFY) 5 mg tablet, Take 1 tablet (5 mg total) by mouth every evening, Disp: 90 tablet, Rfl: 2  •  calcium carbonate (OS-GABE) 1250 (500 Ca) MG tablet, Take 1 tablet by mouth daily, Disp: , Rfl:   •  Cholecalciferol (VITAMIN D3) 2000 units TABS, Take 1 capsule by mouth daily, Disp: , Rfl:   •  fluticasone (FLONASE) 50 mcg/act nasal spray, 2 sprays into each nostril daily, Disp: , Rfl:   •  multivitamin (THERAGRAN) TABS, Take 1 tablet by mouth daily, Disp: , Rfl:   •  PARoxetine (PAXIL) 40 MG tablet, Take 1 tablet (40 mg total) by mouth every evening, Disp: 90 tablet, Rfl: 2  •  verapamil (VERELAN PM) 180 MG 24 hr capsule, Take 1 capsule by mouth once a week, Disp: , Rfl:   •  amoxicillin (AMOXIL) 500 mg capsule, Take 2,000 mg by mouth as needed 1 hour prior to dental appointment (Patient not taking: Reported on 6/17/2023), Disp: , Rfl:   •  fexofenadine (ALLEGRA) 180 MG tablet, Take 180 mg by mouth daily (Patient not taking: Reported on 2/1/2023), Disp: , Rfl:   •  hydrocortisone 2 5 % cream, Apply topically 2 (two) times a day as needed, Disp: , Rfl:   •  hydroquinone 4 % cream, Apply topically 2 (two) times a day, Disp: , Rfl:     Current Allergies     Allergies as of 06/17/2023   • (No Known Allergies)            The following portions of the patient's history were reviewed and updated as appropriate: allergies, current medications, past family history, past medical history, past social history, past surgical history and problem list      Past Medical History:   Diagnosis Date   • Allergic rhinitis    • Arthritis    • Asthma    • Atelectasis    • Atopic rhinitis    • Eczema    • Hyperlipidemia    • Low back pain    • MVP (mitral valve prolapse)    • Palpitations    • Subconjunctival hemorrhage    • Vitamin D deficiency        Past Surgical History:   Procedure Laterality Date   • NO PAST SURGERIES         Family History   Problem Relation Age of Onset   • Depression Maternal Grandmother    • Bipolar disorder Paternal Aunt    • Substance Abuse Neg Hx    • Suicide Attempts Neg Hx          Medications have been verified  Objective   /75   Pulse 74   Temp 97 7 °F (36 5 °C)   Resp 18   SpO2 99%   No LMP recorded  Patient is postmenopausal        Physical Exam     Physical Exam  Musculoskeletal:         General: Swelling (4th toe) and tenderness (with palpation of the 3rd, 4th and 5th fties, highest on the right 4th tow) present  No deformity  Skin:     Capillary Refill: Capillary refill takes less than 2 seconds  Findings: No bruising

## 2023-08-03 DIAGNOSIS — F41.1 GAD (GENERALIZED ANXIETY DISORDER): Chronic | ICD-10-CM

## 2023-08-03 DIAGNOSIS — F33.2 MAJOR DEPRESSIVE DISORDER, RECURRENT SEVERE WITHOUT PSYCHOTIC FEATURES (HCC): Chronic | ICD-10-CM

## 2023-08-03 RX ORDER — PAROXETINE HYDROCHLORIDE 40 MG/1
40 TABLET, FILM COATED ORAL EVERY EVENING
Qty: 90 TABLET | Refills: 3 | OUTPATIENT
Start: 2023-08-03

## 2023-09-21 DIAGNOSIS — F33.2 MAJOR DEPRESSIVE DISORDER, RECURRENT SEVERE WITHOUT PSYCHOTIC FEATURES (HCC): Primary | Chronic | ICD-10-CM

## 2023-09-21 DIAGNOSIS — F41.1 GAD (GENERALIZED ANXIETY DISORDER): Chronic | ICD-10-CM

## 2023-09-21 RX ORDER — ALPRAZOLAM 0.5 MG/1
0.5 TABLET ORAL 3 TIMES DAILY PRN
Qty: 90 TABLET | Refills: 1 | Status: SHIPPED | OUTPATIENT
Start: 2023-09-21 | End: 2023-11-20

## 2023-09-21 RX ORDER — PAROXETINE HYDROCHLORIDE 40 MG/1
40 TABLET, FILM COATED ORAL EVERY EVENING
Qty: 90 TABLET | Refills: 0 | Status: SHIPPED | OUTPATIENT
Start: 2023-09-21 | End: 2023-12-20

## 2023-09-21 NOTE — TELEPHONE ENCOUNTER
Medication Refill Request     Name of Medication Xanax  Dose/Frequency 0.5mg take 1 tablet by mouth 3 times a day  Quantity 90  Verified pharmacy   [x]  Verified ordering Provider   [x]  Does patient have enough for the next 3 days? Yes [x] No []  Does patient have a follow-up appointment scheduled? Yes [x] No []   If so when is appointment: 11/6/2023 3:30pm    Medication Refill Request     Name of Medication Paxil  Dose/Frequency 40mg take 1 tablet by mouth every evening  Quantity 90  Verified pharmacy   [x]  Verified ordering Provider   [x]  Does patient have enough for the next 3 days? Yes [x] No []  Does patient have a follow-up appointment scheduled?  Yes [x] No []   If so when is appointment: 11/6/2023 3:30pm

## 2023-09-21 NOTE — TELEPHONE ENCOUNTER
Paxil was escripted for 90 days, no RF.  Xanax was escripted for 30 days with 1 RF - due now as per PDMP

## 2023-10-27 NOTE — PSYCH
MEDICATION MANAGEMENT NOTE        [facility]      Name and Date of Birth:  Ramona Troy y.o. 1960 MRN: [id number]    Insurance: Payor: Ana Mir / Plan: Lorenza Silva / Product Type: Geovanna Wesley for Service /     Date of Visit: 2023    Reason for Visit:   Chief Complaint   Patient presents with    Medication Management    Follow-up       SUBJECTIVE:    Shereen James is seen today for a follow up for Major Depressive Disorder and Generalized Anxiety Disorder. She has improved since the last visit. She states that depressive symptoms are less prominent, rates mood as 6 on a scale of 1 (best mood) to 10 (worst mood). She also reports less prominent anxiety symptoms. She feels that she has been coping better with her son's illness "he is still in school now until he is 22". .    She denies any suicidal ideation, intent or plan at present; denies any homicidal ideation, intent or plan at present. She has no auditory hallucinations, denies any visual hallucinations, has no delusional thinking. She denies any side effects from current psychiatric medications.  She has not been taking Abilify    HPI ROS Appetite Changes and Sleep:     She reports normal sleep, adequate number of sleep hours (6 hours), normal appetite, recent weight gain (15 lbs), normal energy level    Current Rating Scores:     Current PHQ-9   PHQ-2/9 Depression Screening    Little interest or pleasure in doing things: 1 - several days  Feeling down, depressed, or hopeless: 1 - several days  Trouble falling or staying asleep, or sleeping too much: 1 - several days  Feeling tired or having little energy: 2 - more than half the days  Poor appetite or overeatin - not at all  Feeling bad about yourself - or that you are a failure or have let yourself or your family down: 2 - more than half the days  Trouble concentrating on things, such as reading the newspaper or watching television: 2 - more than half the days  Moving or speaking so slowly that other people could have noticed. Or the opposite - being so fidgety or restless that you have been moving around a lot more than usual: 0 - not at all  Thoughts that you would be better off dead, or of hurting yourself in some way: 0 - not at all  PHQ-9 Score: 9   PHQ-9 Interpretation: Mild depression          Current PHQ-9 score is decreased from 18 at the last visit). Review Of Systems:      Constitutional recent weight gain (15 lbs)   ENT negative   Cardiovascular negative   Respiratory negative   Gastrointestinal negative   Genitourinary negative   Musculoskeletal neck pain and shoulder pain   Integumentary negative   Neurological negative   Endocrine negative   Other Symptoms none, all other systems are negative       Past Psychiatric History: (unchanged information from previous note copied and updated)    Past Inpatient Psychiatric Treatment:   No history of past inpatient psychiatric admissions. Past Outpatient Psychiatric Treatment:   In outpatient treatment at [address] [place] [address] for many years.   Past Suicide Attempts: no  Past Violent Behavior: no  Past Psychiatric Medication Trials: Paxil, Prozac, Zoloft, Effexor, Wellbutrin and Xanax    Traumatic History: (unchanged information from previous note copied and updated)    Abuse: no history of physical or sexual abuse  Other Traumatic Events: none     Past Medical History:    Past Medical History:   Diagnosis Date    Allergic rhinitis     Arthritis     Asthma     Atelectasis     Atopic rhinitis     Eczema     Hyperlipidemia     Low back pain     MVP (mitral valve prolapse)     Palpitations     Subconjunctival hemorrhage     Vitamin D deficiency         Past Surgical History:   Procedure Laterality Date    COLONOSCOPY       No Known Allergies    Substance Abuse History:    Social History     Substance and Sexual Activity   Alcohol Use Yes    Comment: Social alcohol use     Social History     Substance and Sexual Activity   Drug Use No       Social History:    Social History     Socioeconomic History    Marital status: /Civil Union     Spouse name: Not on file    Number of children: 1    Years of education: bachelor's degree     Highest education level: Bachelor's degree (e.g., BA, AB, BS)   Occupational History    Occupation: On disability; worked in office management   Tobacco Use    Smoking status: Never    Smokeless tobacco: Never   Vaping Use    Vaping Use: Never used   Substance and Sexual Activity    Alcohol use: Yes     Comment: Social alcohol use    Drug use: No    Sexual activity: Yes     Partners: Male   Other Topics Concern    Not on file   Social History Narrative    Education: bachelor's degree in communications    Learning Disabilities: none    Marital History:     Children: 1 adoptive son     Living Arrangement: lives in home with , son and nephew    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, on permanent disability    Functioning Relationships:  is supportive    Legal History: none     History: None     Social Determinants of Health     Financial Resource Strain: Low Risk  (11/6/2023)    Overall Financial Resource Strain (CARDIA)     Difficulty of Paying Living Expenses: Not hard at all   Food Insecurity: No [address] (11/6/2023)    Hunger Vital Sign     Worried About Running Out of Food in the Last Year: Never true     [address] in the Last Year: Never true   Transportation Needs: No Transportation Needs (11/6/2023)    PRAPARE - Transportation     Lack of Transportation (Medical): No     Lack of Transportation (Non-Medical):  No   Physical Activity: Sufficiently Active (11/6/2023)    Exercise Vital Sign     Days of Exercise per Week: 3 days     Minutes of Exercise per Session: 50 min   Stress: Stress Concern Present (11/6/2023)    [address] [place] [address] Feeling of Stress : To some extent   Social Connections: Moderately Isolated (11/6/2023)    Social Connection and Isolation Panel [NHANES]     Frequency of Communication with Friends and Family: Never     Frequency of Social Gatherings with Friends and Family: Never     Attends Yazidism Services: More than 4 times per year     Active Member of [employer] Group or Organizations: No     Attends Club or Organization Meetings: Never     Marital Status:    Intimate Partner Violence: Not At Risk (11/6/2023)    Humiliation, Afraid, Rape, and Kick questionnaire     Fear of Current or Ex-Partner: No     Emotionally Abused: No     Physically Abused: No     Sexually Abused: No   Housing Stability: Low Risk  (11/6/2023)    Housing Stability Vital Sign     Unable to Pay for Housing in the Last Year: No     Number of Places Lived in the Last Year: 1     Unstable Housing in the Last Year: No       Family Psychiatric History:     Family History   Problem Relation Age of Onset    Depression Maternal Grandmother     Bipolar disorder Paternal Aunt     Substance Abuse Neg Hx     Suicide Attempts Neg Hx        History Review:  The following portions of the patient's history were reviewed and updated as appropriate: allergies, current medications, past family history, past medical history, past social history, past surgical history, and problem list.         OBJECTIVE:     Vital signs in last 24 hours:    Vitals:    11/06/23 1535   BP: 132/83   Pulse: 83   Weight: 81.2 kg (179 lb)   Height: 5' 3" (1.6 m)       Mental Status Evaluation:    Appearance age appropriate, casually dressed   Behavior cooperative, mildly anxious   Speech normal rate, normal volume, normal pitch   Mood less anxious, less depressed   Affect constricted   Thought Processes organized, goal directed   Associations intact associations   Thought Content no overt delusions   Perceptual Disturbances: no auditory hallucinations, no visual hallucinations   Abnormal Thoughts  Risk Potential Suicidal ideation - None  Homicidal ideation - None  Potential for aggression - No   Orientation oriented to person, place, time/date, and situation   Memory recent and remote memory grossly intact   Consciousness alert and awake   Attention Span Concentration Span attention span and concentration appear shorter than expected for age   Intellect appears to be of average intelligence   Insight intact   Judgement intact   Muscle Strength and  Gait normal muscle strength and normal muscle tone, normal gait and normal balance   Motor activity no abnormal movements   Language no difficulty naming common objects, no difficulty repeating a phrase, no difficulty writing a sentence   Fund of Knowledge adequate knowledge of current events  adequate fund of knowledge regarding past history  adequate fund of knowledge regarding vocabulary    Pain moderate   Pain Scale 5       Laboratory Results: I have personally reviewed all pertinent laboratory/tests results    LIPID PANEL  Order: [id number]   Ref Range & Units 4/19/22  9:40 AM   Cholesterol <200 mg/dL 218 High    Triglyceride <150 mg/dL 64   Cholesterol, HDL, Direct >40 mg/dL 65   Cholesterol, Non-HDL <160 mg/dL 153   Comment: Note: For NCEP interpretive guidelines please refer to the Laboratory Handbook. Cholesterol, LDL, Calculated <130 mg/dL 140 High    Comment: LDL Cholesterol was calculated using the Friedewald equation. Direct measurement of LDL is not indicated for this patient based on [facility]'s analytical algorithm for measurement of LDL Cholesterol.    CHOL/HDL Ratio  3.35     CBC AND DIFFERENTIAL  Order: [id number]   Ref Range & Units 4/19/22  9:40 AM   Hemoglobin 11.5 - 14.5 g/dL 12.5   Hematocrit 35.0 - 43.0 % 38.3   WBC 4.0 - 10.0 thou/cmm 4.1   RBC 3.70 - 4.70 mill/cmm 4.69   Platelet Count 592 - 350 thou/cmm 228   MPV 7.5 - 11.3 fL 8.4   MCV 80 - 100 fL 82   MCH 26.0 - 34.0 pg 26.6   MCHC 32.0 - 37.0 g/dL 32.6   RDW 12.0 - 16.0 % 14.5 Differential Type  AUTO   Absolute Neutrophils 1.8 - 7.8 thou/cmm 2.4   Absolute Lymphocytes 1.0 - 3.0 thou/cmm 1.2   Absolute Monocytes 0.3 - 1.0 thou/cmm 0.4   Absolute Eosinophils 0.0 - 0.5 thou/cmm 0.1   Absolute Basophils 0.0 - 0.1 thou/cmm 0.0   Neutrophils % 58   Lymphocytes % 29   Monocytes % 9   Eosinophils % 3   Basophils % 1     COMPREHENSIVE METABOLIC PANEL  Order: [id number]   Ref Range & Units 4/19/22  9:40 AM   Glucose 65 - 99 mg/dL 86   BUN 7 - 25 mg/dL 8   Creatinine 0.40 - 1.10 mg/dL 0.91   Sodium 135 - 145 mmol/L 144   Potassium 3.5 - 5.2 mmol/L 4.2   Chloride 100 - 109 mmol/L 108   Carbon Dioxide 23 - 31 mmol/L 29   Calcium 8.5 - 10.1 mg/dL 9.3   Alkaline Phosphatase 35 - 120 U/L 67   Albumin 3.5 - 4.8 g/dL 3.7   Bilirubin, Total 0.2 - 1.0 mg/dL 0.2   Comment: Use of this assay is not recommended for patients undergoing treatment with eltrombopag due to the potential for falsely elevated results. Protein, Total 6.3 - 8.3 g/dL 6.7   AST <41 U/L 12   ALT <56 U/L 18   Anion Gap 3 - 11 7   eGFRcr >59 72   eGFRcr Comment  Interpretive information: calculated GFR       Suicide/Homicide Risk Assessment:    Risk of Harm to Self:  Demographic risk factors include: age: over 48 or older  Historical Risk Factors include: chronic depressive symptoms, chronic anxiety symptoms  Recent Specific Risk Factors include: diagnosis of depression, current depressive symptoms, current anxiety symptoms  Protective Factors: no current suicidal ideation, being a parent, being , compliant with medications, compliant with mental health treatment, connection to own children, responsibilities and duties to others, stable living environment, supportive family  Weapons: none. The following steps have been taken to ensure weapons are properly secured: not applicable  Based on today's assessment, Neymar Lima presents the following risk of harm to self: low    Risk of Harm to Others:   The following ratings are based on assessment at the time of the interview  Based on today's assessment, Devon Worthy presents the following risk of harm to others: none    The following interventions are recommended: no intervention changes needed    Assessment/Plan:       Diagnoses and all orders for this visit:    Major depressive disorder, recurrent severe without psychotic features ([address])  -     PARoxetine (PAXIL) 40 MG tablet; Take 1 tablet (40 mg total) by mouth every evening    JOSE ROBERTO (generalized anxiety disorder)  -     ALPRAZolam (XANAX) 0.5 mg tablet; Take 1 tablet (0.5 mg total) by mouth 3 (three) times a day as needed for anxiety  -     PARoxetine (PAXIL) 40 MG tablet; Take 1 tablet (40 mg total) by mouth every evening          Treatment Recommendations/Precautions:    Continue Paxil 40 mg every evening to improve depressive symptoms  Off Abilify - does not want to restart  Continue Xanax 0.5 mg three times a day as needed to improve anxiety symptoms  Medication management every 3 months  Will start individual therapy with [facility] therapist Anthony Gross  Follows with family physician for yearly physical exam, asthma, arthritis, and hyperlipidemia  Aware of 24 hour and weekend coverage for urgent situations accessed by calling [facility] main practice number  I am scheduling this patient out for greater than 3 months: No    Medications Risks/Benefits      Risks, Benefits And Possible Side Effects Of Medications:    Risks, benefits, and possible side effects of medications explained to Devon Worthy including risk of suicidality and serotonin syndrome related to treatment with antidepressants and risks of misuse, abuse or dependence, sedation and respiratory depression related to treatment with benzodiazepine medications. She verbalizes understanding and agreement for treatment.     Controlled Medication Discussion:     Devon Worthy has been filling controlled prescriptions on time as prescribed according to [facility]  Discussed with Antonia Barber the risks of sedation, respiratory depression, impairment of ability to drive and potential for abuse and addiction related to treatment with benzodiazepine medications. She understands risk of treatment with benzodiazepine medications, agrees to not drive if feels impaired and agrees to take medications as prescribed    Psychotherapy Provided:     Individual psychotherapy provided: Yes  Counseling was provided during the session today for 16 minutes. Medications, treatment progress and treatment plan reviewed with Antonia Barber. Medication education provided to Antonia Barber. Goals discussed during in session: improve control of anxiety and improve control of depression. Discussed with Antonia Barber coping with son's illness and chronic anxiety. Coping techniques including starting therapy and taking walks reviewed with Antonia Barber. Supportive therapy provided. Treatment Plan:    Completed and signed during the session: Yes - with Antonia Barber    Note Share: This note was shared with patient.     Visit Time    Visit Start Time: 3:32 PM  Visit Stop Time: 3:59 PM  Total Visit Duration:  27 minutes    Alka Ahn MD 11/06/23

## 2023-11-06 ENCOUNTER — OFFICE VISIT (OUTPATIENT)
Dept: PSYCHIATRY | Facility: CLINIC | Age: 63
End: 2023-11-06
Payer: COMMERCIAL

## 2023-11-06 VITALS
WEIGHT: 179 LBS | HEIGHT: 63 IN | HEART RATE: 83 BPM | DIASTOLIC BLOOD PRESSURE: 83 MMHG | BODY MASS INDEX: 31.71 KG/M2 | SYSTOLIC BLOOD PRESSURE: 132 MMHG

## 2023-11-06 DIAGNOSIS — F41.1 GAD (GENERALIZED ANXIETY DISORDER): Chronic | ICD-10-CM

## 2023-11-06 DIAGNOSIS — F33.2 MAJOR DEPRESSIVE DISORDER, RECURRENT SEVERE WITHOUT PSYCHOTIC FEATURES (HCC): Primary | Chronic | ICD-10-CM

## 2023-11-06 PROBLEM — Z79.899 LONG-TERM USE OF HIGH-RISK MEDICATION: Chronic | Status: RESOLVED | Noted: 2020-12-11 | Resolved: 2023-11-06

## 2023-11-06 PROCEDURE — 90833 PSYTX W PT W E/M 30 MIN: CPT | Performed by: PSYCHIATRY & NEUROLOGY

## 2023-11-06 PROCEDURE — 99214 OFFICE O/P EST MOD 30 MIN: CPT | Performed by: PSYCHIATRY & NEUROLOGY

## 2023-11-06 RX ORDER — PAROXETINE HYDROCHLORIDE 40 MG/1
40 TABLET, FILM COATED ORAL EVERY EVENING
Qty: 90 TABLET | Refills: 2 | Status: SHIPPED | OUTPATIENT
Start: 2023-11-06 | End: 2024-08-02

## 2023-11-06 RX ORDER — ALPRAZOLAM 0.5 MG/1
0.5 TABLET ORAL 3 TIMES DAILY PRN
Qty: 90 TABLET | Refills: 4 | Status: SHIPPED | OUTPATIENT
Start: 2023-11-06 | End: 2024-04-04

## 2023-11-06 NOTE — BH TREATMENT PLAN
TREATMENT PLAN (Medication Management Only)        [facility]    Name/Date of Birth/MRN:  Lamar Vasquez 61 y.o. 1960 MRN: [id number]  Date of Treatment Plan: November 6, 2023  Diagnosis/Diagnoses:   1. Major depressive disorder, recurrent severe without psychotic features ([address])    2. JOSE ROBERTO (generalized anxiety disorder)      Strengths/Personal Resources for Self-Care: "remaining calm". Area/Areas of need (in own words): "organizing my house, my papers, my life". 1. Long Term Goal:   alleviate anxiety, improve control of depression  Target Date: 3 months - 2/6/2024  Person/Persons responsible for completion of goal: Taurus Guardado  2. Short Term Objective (s) - How will we reach this goal?:   A. Provider new recommended medication/dosage changes and/or continue medication(s): continue current medications as prescribed (Paxil and Xanax). B.  N/A.  C.  N/A. Target Date: 3 months - 2/6/2024  Person/Persons Responsible for Completion of Goal: Taurus Guardado   Progress Towards Goals: progressing  Treatment Modality: medication management every 3 months, referral for individual psychotherapy  Review due 180 days from date of this plan: 6 months - 5/6/2024  Expected length of service: ongoing treatment unless revised  My Physician/PA/NP and I have developed this plan together and I agree to work on the goals and objectives. I understand the treatment goals that were developed for my treatment.   Electronic Signatures: on file (unless signed below)    Ellen Oconnell MD 11/06/23

## 2023-12-18 ENCOUNTER — TELEPHONE (OUTPATIENT)
Dept: PSYCHIATRY | Facility: CLINIC | Age: 63
End: 2023-12-18

## 2023-12-18 NOTE — TELEPHONE ENCOUNTER
Writer contacted patient to reschedule today's NP appointment as provider is out of office today. Rescheduled for 1/04 at 10 am

## 2024-01-04 ENCOUNTER — SOCIAL WORK (OUTPATIENT)
Dept: BEHAVIORAL/MENTAL HEALTH CLINIC | Facility: CLINIC | Age: 64
End: 2024-01-04
Payer: COMMERCIAL

## 2024-01-04 DIAGNOSIS — F33.2 MAJOR DEPRESSIVE DISORDER, RECURRENT SEVERE WITHOUT PSYCHOTIC FEATURES (HCC): Primary | Chronic | ICD-10-CM

## 2024-01-04 DIAGNOSIS — F41.1 GAD (GENERALIZED ANXIETY DISORDER): Chronic | ICD-10-CM

## 2024-01-04 PROCEDURE — 90791 PSYCH DIAGNOSTIC EVALUATION: CPT | Performed by: SOCIAL WORKER

## 2024-01-04 NOTE — PSYCH
" Behavioral Health Psychotherapy Assessment    Date of Initial Psychotherapy Assessment: 01/04/24  Referral Source: Psychiatry/Self  Has a release of information been signed for the referral source? NA    Preferred Name: Latosha Rahman  Preferred Pronouns: She/her  YOB: 1960 Age: 63 y.o.  Sex assigned at birth: female   Gender Identity: Female  Race:   Preferred Language: English    Emergency Contact:  Full Name: Genaro Rahman  Relationship to Client: Spouse  Contact information: [phone]    Primary Care Physician:  Mamta Hoffman,   [address] [facility]  [address]  [place] [address] [phone] (Work)  [phone] (Fax)    Has a release of information been signed? Unknown    Physical Health History:  Past surgical procedures: None reported  Do you have a history of any of the following: Mitral valve prolapse - following with cardiology for this  Do you have any mobility issues? No    Relevant Family History:  Possibly undiagnosed mental health in paternal aunt and maternal grandmother (would become \"unable to take care of herself, wouldn't eat, wouldn't interact... would come out of it after several years\").     Presenting Problem (What brings you in?)  \"A struggle to find marysol and become unstuck.\" Latosha reports primary stressor including her adult son who has autism spectrum disorder. She describes some stress in her marriage related to this and overall reports that she is grappling with her life not turning out as she may have pictured years ago.     Latosha reports she would appreciate a direct approach in her sessions.    Mental Health Advance Directive:  Do you currently have a Mental Health Advance Directive?no    Diagnosis:   Diagnosis ICD-10-CM Associated Orders   1. Major depressive disorder, recurrent severe without psychotic features (HCC)  F33.2       2. JOSE ROBERTO (generalized anxiety disorder)  F41.1           Initial Assessment:     Current Mental Status:    " Appearance: appropriate and casual      Behavior/Manner: cooperative      Affect/Mood:  Anxious and blue    Speech:  Normal    Oriented to: oriented to self, oriented to place and oriented to time       Clinical Symptoms    Depression: yes      Depression Symptoms: depressed mood, thoughts that death would be easier, social isolation, fatigue, indecision, poor concentration, weight gain and hypersomnia      Have you ever been assaultive to others or the environment: No      Have you ever been self-injurious: No      Counseling History:  Previous Counseling or Treatment  (Mental Health or Drug & Alcohol): Yes    Previous Counseling Details:  Saw a therapist named Apple Cabrera for 10 years.   Have you previously taken psychiatric medications: Yes      Suicide Risk Assessment  Have you ever had a suicide attempt: No    Have you had incidents of suicidal ideation: Yes    Are you currently experiencing suicidal thoughts: No    Additional Suicide Risk Information:  Passive suicidal thoughts described; no plans to act on thoughts, no plans have previously developed.    Substance Abuse/Addiction Assessment:  Alcohol: No    Heroin: No    Fentanyl: No    Opiates: No    Cocaine: No    Amphetamines: No    Hallucinogens: No    Club Drugs: No    Benzodiazepines: No    Other Rx Meds: No    Marijuana: No    Tobacco/Nicotine: No    Have you experienced blackouts as a result of substance use: No    Have you had any periods of abstinence: No    Have you experienced symptoms of withdrawal: No    Have you ever overdosed on any substances?: No    Are you currently using any Medication Assisted Treatment for Substance Use: No      Compulsive Behaviors:  Compulsive Behavior Information:  None reported    Disordered Eating History:  Do you have a history of disordered eating: No      Trauma and Abuse History:    Have you ever been abused: No      Legal History:    Have you ever been arrested  or had a DUI: No      Have you been  incarcerated: No      Are you currently on parole/probation: No      Any current Children and Youth involvement: No      Any pending legal charges: No      Relationship History:    Current marital status:       Relationship History:  Live at home with , son Francesco (20- diagnosed with ASD), and 's nephew. No other children. Latosha also reports she cared for her mother for about 7 months around 2008 before she succumbed to cancer. Her father had a stroke and passed in 2012. She does have an older sister in [place] and younger brother in [place]. She notes in terms of supports that her  does try to be supportive and friends who were previously strong supports have become distanced over the years.     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    Future work goals:  None currently    Sources of income/financial support:  Social Security Disability (SSDI) and family members     History:      Status: no history of  duty  Educational History:     Have you ever been diagnosed with a learning disability: No      Highest level of education:  Bachelor's Degree    School attended/attending:  [employer]    Have you ever had an IEP or 504-plan: No      Do you need assistance with reading or writing: No      Recommended Treatment:     Psychotherapy:  Individual sessions    Frequency:  2 times    Session frequency:  Monthly      Visit start and stop times:    01/04/24  Start Time: 1014  Stop Time: 1104  Total Visit Time: 50 minutes

## 2024-01-08 ENCOUNTER — TELEPHONE (OUTPATIENT)
Dept: PSYCHIATRY | Facility: CLINIC | Age: 64
End: 2024-01-08

## 2024-01-08 NOTE — TELEPHONE ENCOUNTER
Writer attempted to contact patient to inform her that her Feb. And March apts have been rescheduled, however the patients voice mailbox was full. A my chart message was sent. Please transfer call to .TY

## 2024-01-24 ENCOUNTER — SOCIAL WORK (OUTPATIENT)
Dept: BEHAVIORAL/MENTAL HEALTH CLINIC | Facility: CLINIC | Age: 64
End: 2024-01-24
Payer: COMMERCIAL

## 2024-01-24 DIAGNOSIS — F33.2 MAJOR DEPRESSIVE DISORDER, RECURRENT SEVERE WITHOUT PSYCHOTIC FEATURES (HCC): Primary | Chronic | ICD-10-CM

## 2024-01-24 DIAGNOSIS — F41.1 GAD (GENERALIZED ANXIETY DISORDER): Chronic | ICD-10-CM

## 2024-01-24 PROCEDURE — 90834 PSYTX W PT 45 MINUTES: CPT | Performed by: SOCIAL WORKER

## 2024-01-24 NOTE — BH TREATMENT PLAN
"Outpatient Behavioral Health Psychotherapy Treatment Plan    Latosha SHANTE Josiah  1960     Date of Initial Psychotherapy Assessment: 1/4/24  Date of Current Treatment Plan: 01/24/24  Treatment Plan Target Date: 7/22/24  Treatment Plan Expiration Date: 7/22/24    Diagnosis:   No diagnosis found.    Area(s) of Need: Depression    Long Term Goal 1 (in the client's own words): \"To become more centered and focused so that I can achieve goals.\" Latosha also reports she would like to ultimately work toward stopping her medications.     Stage of Change: {SL AMB PSYCH STAGE OF CHANGE:39117}    Target Date for completion: 7/22/24     Anticipated therapeutic modalities: Individual therapy, cognitive behavioral therapy and client-centered therapy.     People identified to complete this goal: Chio Birch [facility]      Objective 1: (identify the means of measuring success in meeting the objective): \"Implement some kind of exercise in the morning.\"       Objective 2: (identify the means of measuring success in meeting the objective): Improve sleep hygiene.      I am currently under the care of a [facility] psychiatric provider: {YES/NO:20200}    My [facility] psychiatric provider is: ***    I am currently taking psychiatric medications: {SL AMB TAKING PSYCH MEDS:02568}    I feel that I will be ready for discharge from mental health care when I reach the following (measurable goal/objective): ***    For children and adults who have a legal guardian:   Has there been any change to custody orders and/or guardianship status? {Yes/No/NA:14781}. If yes, attach updated documentation.    I have {SL AMB PSYCH CREATED/UPDATED:54225} my Crisis Plan and have been offered a copy of this plan    Behavioral Health Treatment Plan [facility]: Diagnosis and Treatment Plan explained to Latosha Rahman {acknowledge/does not acknowledge:19279} an understanding of their diagnosis. Latosha Rahman {SL AMB PSYCH AGREE/DISAGREE:85423} " this treatment plan.    I have been offered a copy of this Treatment Plan. {YES/NO:20200}

## 2024-01-30 NOTE — PSYCH
"Behavioral Health Psychotherapy Progress Note    Psychotherapy Provided: Individual Psychotherapy     1. Major depressive disorder, recurrent severe without psychotic features (HCC)        2. JOSE ROBERTO (generalized anxiety disorder)            Goals addressed in session: Treatment plan discussed but not formally created in document form; drafted treatment goal/objectives listed below, to be finalized at next session.    Area(s) of Need: Depression    Long Term Goal 1 (in the client's own words): \"To become more centered and focused so that I can achieve goals.\" Latosha also reports she would like to ultimately work toward stopping her medications.      Anticipated therapeutic modalities: Individual therapy, cognitive behavioral therapy and client-centered therapy.     People identified to complete this goal: Chio BirchW      Objective 1: (identify the means of measuring success in meeting the objective): \"Implement some kind of exercise in the morning.\"       Objective 2: (identify the means of measuring success in meeting the objective): Improve sleep hygiene.    DATA: LCSW met with Latosha Rahman. Session was conducted In-person. LCSW reviewed purpose of treatment planning process and prompted Latosha to begin considering her goal areas. Latosha spoke about difficulty balancing her life day-to-day and feeling concerned that she will often wake up to get her son to school and then go back to bed. She wants to get more out of each day. We discussed some interventions as Latosha asks for ways to tackle this problem; LCSW briefly discussed behavioral activation and use of sleep diaries. While Latosha feels sleep diaries are likely not realistic for her to complete right now, she is open to discussing short-term goal setting. LCSW provided therapist aid \"Building New Habits\" and \"Habit Plan\" handouts; emphasized importance of setting reasonable goals, celebrating successes, building new habits into existing " routines, and identifying methods of accountability (personal note taking, sharing goals with another person, etc). Latosha spoke about her relationship with her  and son and shared an example of a recent stressful situation during which she was cooking scott for her 's lunch and her son ate it when it was cooked without her realizing this. She spoke about the adjustment of looking at her life differently due to raising her son and supporting him. She does identify enjoying the companionship of her dog and shared pictures with LCSW. LCSW noted the unique relationship we can form with pets. Noted all that Latosha does to care for her family and encouraged her to reflect on whether she receives that same care/prioritizes herself the same way. In a most basic sense, discussed that if her dog is fed, cherished, and given a walk daily that she deserves at least the same. Latosha Rahman processed having enjoyed today's session. Treatment plan and crisis plan to be finalized at next session. Specific techniques used in this session were: psychoeducation, problem solving approach, empathetic listening, and validation.    During this session, this clinician used the following therapeutic modalities: Engagement Strategies, Client-centered Therapy, Solution-Focused Therapy, and Supportive Psychotherapy    Substance Abuse was not addressed during this session. If the client is diagnosed with a co-occurring substance use disorder, please indicate any changes in the frequency or amount of use: N/A. Stage of change for addressing substance use diagnoses: No substance use/Not applicable    ASSESSMENT:  Latosha Rahman presents with a Depressed mood. Latosha Rahman was oriented to person, place, time, and situation. Grooming and Hygiene were good  and clothing was casually dressed and appropriate for weather. Ability to perform ADLs has not changed. she was cooperative.      her affect is Normal range and intensity,  "somewhat constricted, which is congruent, with her mood and the content of the session. The client has not yet made progress on their goals.    Latosha Rahman presents with a minimal risk of suicide, none risk of self-harm, and none risk of harm to others.    For any risk assessment that surpasses a \"low\" rating, a safety plan must be developed.    A safety plan was indicated: no  If yes, describe in detail: N/A    PLAN: Between sessions, Latosha Rahman will continue to consider habit-building strategies. At the next session, the therapist will use Client-centered Therapy, Cognitive Behavioral Therapy, and Supportive Psychotherapy to address symptoms as they arise. Latosha Rahman will return to outpatient therapy on 2/7/24.     Behavioral Health Treatment Plan and Discharge Planning: Latosha Rahman is aware of and agrees to continue to work on their treatment plan. They have identified and are working toward their discharge goals. No - see above    Visit start and stop times:    01/24/24  Start Time: 0107  Stop Time: 0156  Total Visit Time: 49 minutes  "

## 2024-02-07 ENCOUNTER — SOCIAL WORK (OUTPATIENT)
Dept: BEHAVIORAL/MENTAL HEALTH CLINIC | Facility: CLINIC | Age: 64
End: 2024-02-07
Payer: COMMERCIAL

## 2024-02-07 DIAGNOSIS — F41.1 GAD (GENERALIZED ANXIETY DISORDER): Chronic | ICD-10-CM

## 2024-02-07 DIAGNOSIS — F33.2 MAJOR DEPRESSIVE DISORDER, RECURRENT SEVERE WITHOUT PSYCHOTIC FEATURES (HCC): Primary | Chronic | ICD-10-CM

## 2024-02-07 PROCEDURE — 90834 PSYTX W PT 45 MINUTES: CPT | Performed by: SOCIAL WORKER

## 2024-02-07 NOTE — PSYCH
Behavioral Health Psychotherapy Progress Note    Psychotherapy Provided: Individual Psychotherapy     1. Major depressive disorder, recurrent severe without psychotic features (HCC)        2. JOSE ROBERTO (generalized anxiety disorder)            Goals addressed in session: Drafted treatment plan below, to be finalized at next session due to time constraints.    Area(s) of Need: Depression, anxiety             Anticipated therapeutic modalities: Individual therapy, client-centered therapy/motivational interviewing/cognitive behavioral therapy and behavioral activation techniques as appropriate             People identified to complete this goal: Chio Birch LCSW                    Objective 1: (identify the means of measuring success in meeting the objective): Begin to process feelings of grief and resentment relating to raising adult son with autism diagnosis.                     Objective 2: (identify the means of measuring success in meeting the objective): Identify small habits to build such as deep breathing, waking up earlier, incorporating socialization into weekly routine.      I am currently under the care of a [facility] psychiatric provider: yes     My [facility] psychiatric provider is: Dr. Hernandez     I am currently taking psychiatric medications: Yes, as prescribed    DATA: LCSW met with Latosha Rahman. Session was conducted In-person. she was able to set an agenda which included feeling discouraged as she has had continued difficulty waking up and starting her day. She does note progress in that she tried to attend a virtual Shinto service, though unfortunately she was unable to join due to technical issues; she is open to trying this again and it is scheduled in the morning which may help in beginning her day. Latosha Rahman processed the emotional difficulty of managing her depression on a daily basis and the toll is has taken on her. She noted that her  will call her to help her wake  "up as she sleeps through alarms. She is currently on the wait list for couples therapy, as well. LCSW and Latosha discussed developing more of a routine as well as short-term goals. LCSW noted importance of taking sustainable steps and acknowledging progress along the way. Specific techniques used in this session were: problem solving approach, empathetic listening, and validation.    During this session, this clinician used the following therapeutic modalities: Client-centered Therapy, Motivational Interviewing, and Supportive Psychotherapy    Substance Abuse was not addressed during this session. If the client is diagnosed with a co-occurring substance use disorder, please indicate any changes in the frequency or amount of use: N/A. Stage of change for addressing substance use diagnoses: No substance use/Not applicable    ASSESSMENT:  Latosha Rahman presents with a Euthymic/ normal and Depressed mood. Latosha Rahman was oriented to person, place, time, and situation. Grooming and Hygiene were good  and clothing was casually dressed and appropriate for weather. Ability to perform ADLs has not changed. she was cooperative.      her affect is Normal range and intensity, which is congruent, with her mood and the content of the session. The client has not yet made progress on their goals.    Latosha Rahman presents with a minimal risk of suicide, none risk of self-harm, and none risk of harm to others.    For any risk assessment that surpasses a \"low\" rating, a safety plan must be developed.    A safety plan was indicated: no  If yes, describe in detail: N/A    PLAN: Between sessions, Latosha Rahman will attempt to attend virtual service again and monitor her mood. At the next session, the therapist will use Client-centered Therapy, Solution-Focused Therapy, and Supportive Psychotherapy to address symptoms as they arise. Plan to finalize treatment plan and crisis plan. Latosha Rahman will return to outpatient therapy on " 2/21/24.     Behavioral Health Treatment Plan and Discharge Planning: Latosha SHANTE Rahman is aware of and agrees to continue to work on their treatment plan. They have identified and are working toward their discharge goals. yes    Visit start and stop times:    02/07/24  Start Time: 1207  Stop Time: 1255  Total Visit Time: 48 minutes

## 2024-02-07 NOTE — BH TREATMENT PLAN
Outpatient Behavioral Health Psychotherapy Treatment Plan    Latosha Rahman  1960     Date of Initial Psychotherapy Assessment: 1/4/24   Date of Current Treatment Plan: 02/07/24  Treatment Plan Target Date: 8/5/24  Treatment Plan Expiration Date: 8/5/24    Diagnosis:   1. Major depressive disorder, recurrent severe without psychotic features (HCC)        2. JOSE ROBERTO (generalized anxiety disorder)            Area(s) of Need: Depression, anxiety    Long Term Goal 1 (in the client's own words): ***    Stage of Change: {SL AMB PSYCH STAGE OF CHANGE:07699}    Target Date for completion: 8/5/24     Anticipated therapeutic modalities: Individual therapy, client-centered therapy/motivational interviewing/cognitive behavioral therapy     [facility] this [facility]      Objective 1: (identify the means of measuring success in meeting the objective): Begin to process feelings of grief and resentment relating to raising adult son with autism diagnosis. Identify methods to reduce social isolation.      Objective 2: (identify the means of measuring success in meeting the objective): Identify small habits to build such as deep breathing, waking up earlier      I am currently under the care of a [facility] psychiatric provider: yes    My [facility] psychiatric provider is: Dr. Hernandez    I am currently taking psychiatric medications: Yes, as prescribed    I feel that I will be ready for discharge from mental health care when I reach the following (measurable goal/objective): ***    For children and adults who have a legal guardian:   Has there been any change to custody orders and/or guardianship status? NA. If yes, attach updated documentation.    I have created my Crisis Plan and have been offered a copy of this plan    Behavioral Health Treatment Plan [facility]: Diagnosis and Treatment Plan explained to Latosha Rahman acknowledges an understanding of their diagnosis.  Latosha Rahman agrees to this treatment plan.    I have been offered a copy of this Treatment Plan. yes

## 2024-02-21 PROBLEM — R05.9 COUGH: Status: RESOLVED | Noted: 2020-10-05 | Resolved: 2024-02-21

## 2024-02-23 ENCOUNTER — TELEPHONE (OUTPATIENT)
Dept: PSYCHIATRY | Facility: CLINIC | Age: 64
End: 2024-02-23

## 2024-02-23 ENCOUNTER — SOCIAL WORK (OUTPATIENT)
Dept: BEHAVIORAL/MENTAL HEALTH CLINIC | Facility: CLINIC | Age: 64
End: 2024-02-23
Payer: COMMERCIAL

## 2024-02-23 DIAGNOSIS — F41.1 GAD (GENERALIZED ANXIETY DISORDER): Chronic | ICD-10-CM

## 2024-02-23 DIAGNOSIS — F33.2 MAJOR DEPRESSIVE DISORDER, RECURRENT SEVERE WITHOUT PSYCHOTIC FEATURES (HCC): Primary | Chronic | ICD-10-CM

## 2024-02-23 PROCEDURE — 90837 PSYTX W PT 60 MINUTES: CPT | Performed by: SOCIAL WORKER

## 2024-02-23 NOTE — PSYCH
"Behavioral Health Psychotherapy Progress Note    Psychotherapy Provided: Individual Psychotherapy     1. Major depressive disorder, recurrent severe without psychotic features (HCC)        2. JOSE ROBERTO (generalized anxiety disorder)            Goals addressed in session: Goal 1 - treatment plan to be finalized at upcoming session    DATA: LCSW met with Latosha Rahman. Session was conducted In-person. Latosha had been scheduled previously in the week, however, rescheduled her session due to attending her mother-in-law's burial on Wednesday. She had hoped her  would attend with her today, however, he did not. she was able to set an agenda which included her mother-in-law passing away and subsequent arrangements made by her /other family. She noted currently hosting many people who came for the burial in her home which has caused an increase in stress. She identified her most significant concern at this time being her perception that her  is not managing his grief appropriately (not being observed crying, planning to return swiftly to work, focusing on responsibility and practical tasks rather than emotional processing). Latosha expressed hope that he would take 12 weeks off work/apply for FMLA to address his own mental health. LCSW and Latosha reviewed that he can certainly address this with his PCP or establish care with a therapist if he is interested, however, this ultimately must be his choice to either address in the way she may expect or continue addressing how he chooses to. LCSW reviewed [place] of control concept and noted that while Latosha may be able to influence her  by expressing her concern for him, it is not within her control to determine how he grieves or whether he takes more time off work. LCSW provided psychoeducation on the differing responses to grief and noted that there is no \"wrong\" way to navigate through this process. Also noted that returning to work may be a source of " "comfort/control/familiarity to her  in his time of grief. Latosha expressed understanding and general agreement despite feeling some continued concern and frustration for him. LCSW and Latosha discussed short-term goal setting once company leaves and allowing herself to celebrate small \"wins.\" Specific techniques used in this session were: psychoeducation, empathetic listening, validation, and cognitive restructuring.    During this session, this clinician used the following therapeutic modalities: Client-centered Therapy, Cognitive Behavioral Therapy, and Supportive Psychotherapy    Substance Abuse was not addressed during this session. If the client is diagnosed with a co-occurring substance use disorder, please indicate any changes in the frequency or amount of use: N/A. Stage of change for addressing substance use diagnoses: No substance use/Not applicable    ASSESSMENT:  Latosha Rahman presents with a Dysthymic mood. Latosha Rahman was oriented to person, place, time, and situation. Grooming and Hygiene were good  and clothing was casually dressed and appropriate for weather. Ability to perform ADLs has not changed. she was cooperative.      her affect is Normal range and intensity, somewhat constricted, which is congruent, with her mood and the content of the session. The client has made progress on their goals.    Latosha Rahman presents with a minimal risk of suicide, none risk of self-harm, and none risk of harm to others.    For any risk assessment that surpasses a \"low\" rating, a safety plan must be developed.    A safety plan was indicated: no  If yes, describe in detail: N/A    PLAN: Between sessions, Latosha Rahman will continue processing grief with her  and work toward short-term small goals within her home after [employer] leaves [facility]. At the next session, the therapist will use Client-centered Therapy and Supportive Psychotherapy to address symptoms as they arise. Latosha Rahman will return " to outpatient therapy on 3/6/24.     Behavioral Health Treatment Plan and Discharge Planning: Latosha SHANTE Rahman is aware of and agrees to continue to work on their treatment plan. They have identified and are working toward their discharge goals. yes    Visit start and stop times:    02/23/24  Start Time: 1206  Stop Time: 1300  Total Visit Time: 54 minutes

## 2024-02-23 NOTE — TELEPHONE ENCOUNTER
Contacted patient off of [facility]  to verify needs of services in attempts to offer patient an appointment at [facility]. Unable to contact pt or lvm as vm box is full.     Received [employer] from therapist about scheduling pt for couples therapy.

## 2024-03-06 ENCOUNTER — TELEPHONE (OUTPATIENT)
Dept: PSYCHIATRY | Facility: CLINIC | Age: 64
End: 2024-03-06

## 2024-03-06 NOTE — TELEPHONE ENCOUNTER
Patient is calling regarding cancelling an appointment.    Date/Time: today 3/6 at 12 pm    Reason: patient's  is having a procedure     Patient was rescheduled: YES [] NO [x]  If yes, when was Patient reschedule for: n/a    Patient requesting call back to reschedule: YES [] NO [x]    Patient shared will see provider on next f/u appt scheduled on 3/20

## 2024-03-21 NOTE — PSYCH
"MEDICATION MANAGEMENT NOTE        [facility]      Name and Date of Birth:  Latosha Rahman 63 y.o. 1960 MRN: [id number]    Insurance: Payor: MEDICARE / Plan: MEDICARE A AND B / Product Type: Medicare A & B Fee for Service /     Date of Visit: March 27, 2024    Reason for Visit:   Chief Complaint   Patient presents with    Medication Management    Follow-up       SUBJECTIVE:    Latosha is seen today for a follow up for Major Depressive Disorder and Generalized Anxiety Disorder. She has decompensated somewhat since the last visit. She feels more depressed, rates mood as 9 on a scale of 1 (best mood) to 10 (worst mood). She reports increased anxiety symptoms. She has been upset about death of her mother-in-law and death of a family friend, also reports struggling with \"family emotions\" and marital problems recently. She states that she would like to consider marital therapy for both her and her .    She denies any suicidal ideation, intent or plan at present; denies any homicidal ideation, intent or plan at present.    She has no auditory hallucinations, denies any visual hallucinations, has no delusional thoughts.    She denies any side effects from current psychiatric medications.    HPI ROS Appetite Changes and Sleep:     She reports increased sleep, increase in number of sleep hours (10 hours), normal appetite, recent weight loss (5 lbs), low energy    Current Rating Scores:     Current PHQ-9   PHQ-2/9 Depression Screening    Little interest or pleasure in doing things: 3 - nearly every day  Feeling down, depressed, or hopeless: 3 - nearly every day  Trouble falling or staying asleep, or sleeping too much: 3 - nearly every day  Feeling tired or having little energy: 3 - nearly every day  Poor appetite or overeating: 3 - nearly every day  Feeling bad about yourself - or that you are a failure or have let yourself or your family down: 3 - nearly every " day  Trouble concentrating on things, such as reading the newspaper or watching television: 3 - nearly every day  Moving or speaking so slowly that other people could have noticed. Or the opposite - being so fidgety or restless that you have been moving around a lot more than usual: 0 - not at all  Thoughts that you would be better off dead, or of hurting yourself in some way: 0 - not at all  PHQ-9 Score: 21  PHQ-9 Interpretation: Severe depression       Current PHQ-9 score is increased from 9 at the last visit).    Review Of Systems:      Constitutional low energy and recent weight loss (5 lbs)   ENT negative   Cardiovascular negative   Respiratory negative   Gastrointestinal negative   Genitourinary negative   Musculoskeletal neck pain and shoulder pain   Integumentary negative   Neurological negative   Endocrine negative   Other Symptoms none, all other systems are negative       Past Psychiatric History: (unchanged information from previous note copied and updated)    Past Inpatient Psychiatric Treatment:   No history of past inpatient psychiatric admissions.  Past Outpatient Psychiatric Treatment:   In outpatient treatment at [facility] for many years.  Past Suicide Attempts: no  Past Violent Behavior: no  Past Psychiatric Medication Trials: Paxil, Prozac, Zoloft, Effexor, Wellbutrin and Xanax    Traumatic History: (unchanged information from previous note copied and updated)    Abuse: no history of physical or sexual abuse  Other Traumatic Events: none     Past Medical History:    Past Medical History:   Diagnosis Date    Allergic rhinitis     Arthritis     Asthma     Atelectasis     Atopic rhinitis     Eczema     Hyperlipidemia     Low back pain     MVP (mitral valve prolapse)     Palpitations     Subconjunctival hemorrhage     Vitamin D deficiency         Past Surgical History:   Procedure Laterality Date    COLONOSCOPY       No Known Allergies    Substance Abuse History:    Social  History     Substance and Sexual Activity   Alcohol Use Yes    Comment: Social alcohol use     Social History     Substance and Sexual Activity   Drug Use No       Social History:    Social History     Socioeconomic History    Marital status: /Civil Union     Spouse name: Not on file    Number of children: 1    Years of education: bachelor's degree     Highest education level: Bachelor's degree (e.g., BA, AB, BS)   Occupational History    Occupation: On disability; worked in office management   Tobacco Use    Smoking status: Never    Smokeless tobacco: Never   Vaping Use    Vaping status: Never Used   Substance and Sexual Activity    Alcohol use: Yes     Comment: Social alcohol use    Drug use: No    Sexual activity: Yes     Partners: Male   Other Topics Concern    Not on file   Social History Narrative    Education: bachelor's degree in communications    Learning Disabilities: none    Marital History:     Children: 1 adoptive son     Living Arrangement: lives in home with , son and nephew    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, on permanent disability    Functioning Relationships:  is supportive    Legal History: none     History: None     Social Determinants of Health     Financial Resource Strain: Low Risk  (3/27/2024)    Overall Financial Resource Strain (CARDIA)     Difficulty of Paying Living Expenses: Not hard at all   Food Insecurity: No Food Insecurity (3/27/2024)    Hunger Vital Sign     Worried About Running Out of Food in the Last Year: Never true     Ran Out of Food in the Last Year: Never true   Transportation Needs: No Transportation Needs (3/27/2024)    PRAPARE - Transportation     Lack of Transportation (Medical): No     Lack of Transportation (Non-Medical): No   Physical Activity: Sufficiently Active (3/27/2024)    Exercise Vital Sign     Days of Exercise per Week: 4 days     Minutes of Exercise per Session: 50 min   Stress: Stress  "Concern Present (3/27/2024)    [employer] [place] [employer] - Occupational Stress Questionnaire     Feeling of Stress : To some extent   Social Connections: Moderately Isolated (3/27/2024)    Social Connection and Isolation Panel [NHANES]     Frequency of Communication with Friends and Family: Never     Frequency of Social Gatherings with Friends and Family: Never     Attends Denominational Services: More than 4 times per year     Active Member of Clubs or Organizations: No     Attends Club or Organization Meetings: Never     Marital Status:    Intimate Partner Violence: Not At Risk (3/27/2024)    Humiliation, Afraid, Rape, and Kick questionnaire     Fear of Current or Ex-Partner: No     Emotionally Abused: No     Physically Abused: No     Sexually Abused: No   Housing Stability: Low Risk  (3/27/2024)    Housing Stability Vital Sign     Unable to Pay for Housing in the Last Year: No     Number of Places Lived in the Last Year: 1     Unstable Housing in the Last Year: No       Family Psychiatric History:     Family History   Problem Relation Age of Onset    Depression Maternal Grandmother     Bipolar disorder Paternal Aunt     Substance Abuse Neg Hx     Suicide Attempts Neg Hx        History Review: The following portions of the patient's history were reviewed and updated as appropriate: allergies, current medications, past family history, past medical history, past social history, past surgical history, and problem list.         OBJECTIVE:     Vital signs in last 24 hours:    Vitals:    03/27/24 1539   BP: 129/79   Pulse: 78   Weight: 78 kg (172 lb)   Height: 5' 3\" (1.6 m)       Mental Status Evaluation:    Appearance age appropriate, casually dressed   Behavior cooperative, appears anxious, limited eye contact   Speech normal rate, soft   Mood depressed, anxious   Affect constricted   Thought Processes organized, goal directed   Associations intact associations   Thought Content no overt delusions "   Perceptual Disturbances: no auditory hallucinations, no visual hallucinations   Abnormal Thoughts  Risk Potential Suicidal ideation - None  Homicidal ideation - None  Potential for aggression - No   Orientation oriented to person, place, time/date, and situation   Memory recent and remote memory grossly intact   Consciousness alert and awake   Attention Span Concentration Span decreased attention span  decreased concentration   Intellect appears to be of average intelligence   Insight intact   Judgement intact   Muscle Strength and  Gait normal muscle strength and normal muscle tone, normal gait and normal balance   Motor activity no abnormal movements   Language no difficulty naming common objects, no difficulty repeating a phrase, no difficulty writing a sentence   Fund of Knowledge adequate knowledge of current events  adequate fund of knowledge regarding past history  adequate fund of knowledge regarding vocabulary    Pain moderate   Pain Scale 5       Laboratory Results: I have personally reviewed all pertinent laboratory/tests results    LIPID PANEL  Order: [id number]  Component  Ref Range & Units 4/19/22  9:40 AM   Cholesterol  <200 mg/dL 218 High    Triglycerides  <150 mg/dL 64   Cholesterol, HDL, Direct  >40 mg/dL 65   Cholesterol, Non-HDL  <160 mg/dL 153   Comment: Note: For [place] EP interpretive guidelines please refer to the Laboratory Handbook.   LDL Cholesterol  <130 mg/dL 140 High    Comment: LDL Cholesterol was calculated using the Friedewald equation. Direct measurement of LDL is not indicated for this patient based on [facility]'s analytical algorithm for measurement of LDL Cholesterol.   CHOL/HDL Ratio 3.35     COMPREHENSIVE METABOLIC PANEL  Order: [id number]   Status: Edited Result - FINAL       Next appt: 03/27/2024 at 03:30 PM in Psychiatry (Regina Hernandez MD)                Component  Ref Range & Units 4/19/22  9:40 AM 3/19/21  1:18 PM 7/18/19 11:58 AM 5/25/18  1:50 PM 5/2/18 11:38 AM 5/13/15 11:47  AM 10/23/13  1:15 PM   Glucose  65 - 99 mg/dL 86   77 88 R, CM 85 R, CM 85 R   BUN  7 - 25 mg/dL 8 10 R 15 R 14 13 R 11 R 10 R   Creatinine  0.40 - 1.10 mg/dL 0.91 1.00 R, CM 1.02 R, CM 0.89 0.84 R, CM 0.79 R, CM 0.79 R   Sodium  135 - 145 mmol/L 144 138 R 141 R 138 139 R 139 140   Potassium  3.5 - 5.2 mmol/L 4.2 3.7 R 4.1 R 3.6 4.3 R 4.7 R 4.3 R   Chloride  100 - 109 mmol/L 108 102 R 103 R 103 104 R 102 R 102 R   Carbon Dioxide  23 - 31 mmol/L 29 32 R 33 High  R 29 29 R 31 R 34 High  R   Calcium  8.5 - 10.1 mg/dL 9.3 9.2 R 9.5 R 9.0 9.4 R 9.1 R 9.6 R   Alkaline Phosphatase  35 - 120 U/L 67 81 R 81 R 71 76 R 74 R 102 R   ALBUMIN  3.5 - 4.8 g/dL 3.7 3.9 R 3.9 R 4.2 4.2 R 3.9 R 4.0 R   Total Bilirubin  0.2 - 1.0 mg/dL 0.2 0.40 R, CM 0.40 R 0.5 0.40 R     Comment: Use of this assay is not recommended for patients undergoing treatment with eltrombopag due to the potential for falsely elevated results.   Protein, Total  6.3 - 8.3 g/dL 6.7 7.6 R 7.7 R 7.3 7.8 R 7.8 R 8.0 R   AST  <41 U/L 12 10 R, CM 15 R, CM 9 16 R, CM 14 R 16 R   ALT  <56 U/L 18 29 R, CM 23 R, CM 16 26 R, CM 20 R, CM 24 R   ANION GAP  3 - 11 7 4 R 5 R 6 6 R 6 R 4 R   eGFRcr  >59 72 71 R 70 R 72 R, CM 89 R     eGFR Comment Interpretive information: calculated GFR              CBC AND DIFFERENTIAL  Status: Edited Result - FINAL         suggestion  Result Information displayed in this report will not trend and may not trigger automated decision support.     CBC AND DIFFERENTIAL  Order: [id number]  Component  Ref Range & Units 4/19/22  9:40 AM   Hemoglobin  11.5 - 14.5 g/dL 12.5   Hematocrit  35.0 - 43.0 % 38.3   WBC  4.0 - 10.0 thou/cmm 4.1   RBC  3.70 - 4.70 mill/cmm 4.69   Platelet Count  140 - 350 thou/cmm 228   MPV  7.5 - 11.3 fL 8.4   MCV  80 - 100 fL 82   MCH  26.0 - 34.0 pg 26.6   MCHC  32.0 - 37.0 g/dL 32.6   RDW  12.0 - 16.0 % 14.5   Differential Type AUTO   Absolute Neutrophils  1.8 - 7.8 thou/cmm 2.4   Absolute Lymphocytes  1.0 - 3.0 thou/cmm 1.2    Absolute Monocytes  0.3 - 1.0 thou/cmm 0.4   Absolute Eosinophils  0.0 - 0.5 thou/cmm 0.1   Absolute Basophils  0.0 - 0.1 thou/cmm 0.0   Neutrophils  % 58   Lymphocytes  % 29   Monocytes  % 9   Eosinophils  % 3   Basophils  % 1       Suicide/Homicide Risk Assessment:    Risk of Harm to Self:  Demographic risk factors include: age: over 50 or older  Historical Risk Factors include: chronic depressive symptoms, chronic anxiety symptoms  Recent Specific Risk Factors include: diagnosis of depression, current depressive symptoms, current anxiety symptoms  Protective Factors: no current suicidal ideation, being a parent, being , compliant with medications, compliant with mental health treatment, connection to own children, responsibilities and duties to others, stable living environment, supportive family  Weapons: none. The following steps have been taken to ensure weapons are properly secured: not applicable  Based on today's assessment, Latosha presents the following risk of harm to self: low    Risk of Harm to Others:  The following ratings are based on assessment at the time of the interview  Based on today's assessment, Latosha presents the following risk of harm to others: none    The following interventions are recommended: no intervention changes needed    Assessment/Plan:       Diagnoses and all orders for this visit:    Major depressive disorder, recurrent severe without psychotic features (HCC)  -     buPROPion (Wellbutrin XL) 150 mg 24 hr tablet; Take 1 tablet (150 mg total) by mouth daily    JOSE ROBERTO (generalized anxiety disorder)  -     ALPRAZolam (XANAX) 0.5 mg tablet; Take 1 tablet (0.5 mg total) by mouth 3 (three) times a day as needed for anxiety Do not start before April 4, 2024.          Treatment Recommendations/Precautions:    Continue Paxil 40 mg every evening to control depressive symptoms  Add Wellbutrin  mg daily also to help with depressive symptoms. No history of seizures, head injury or  eating disorder  Continue Xanax 0.5 mg three times a day as needed to control anxiety symptoms  Medication management every 2 months  Continue psychotherapy with [facility] therapist Chio Gutiérrez  Follows with family physician for yearly physical exam, asthma, arthritis, and hyperlipidemia  Aware of 24 hour and weekend coverage for urgent situations accessed by calling [facility] main practice number  I am scheduling this patient out for greater than 3 months: No    Medications Risks/Benefits      Risks, Benefits And Possible Side Effects Of Medications:    Risks, benefits, and possible side effects of medications explained to Latosha including risk of suicidality and serotonin syndrome related to treatment with antidepressants and risks of misuse, abuse or dependence, sedation and respiratory depression related to treatment with benzodiazepine medications. She verbalizes understanding and agreement for treatment.    Controlled Medication Discussion:     Latosha has been filling controlled prescriptions on time as prescribed according to [place] Prescription Drug Monitoring Program  Discussed with Latosha the risks of sedation, respiratory depression, impairment of ability to drive and potential for abuse and addiction related to treatment with benzodiazepine medications. She understands risk of treatment with benzodiazepine medications, agrees to not drive if feels impaired and agrees to take medications as prescribed    Psychotherapy Provided:     Individual psychotherapy provided: Yes  Counseling was provided during the session today for 16 minutes.  Medications, treatment progress and treatment plan reviewed with Latosha.  Medication changes discussed with Latosha.  Medication education provided to Latosha.  Goals discussed during in session: improve control of anxiety and improve control of depression.   Discussed with Latosha coping with marital problems, death of mother-in-law and friend, and chronic  anxiety.   Coping strategies including exercising and talking to a therapist reviewed with Latosha.   Supportive therapy provided.      Treatment Plan:    Completed and signed during the session: Yes - with Latosha    Note Share:    This note was shared with patient.    Visit Time    Visit Start Time: 3:39 PM  Visit Stop Time: 4:05 PM  Total Visit Duration:  26 minutes    Regina Hernandez MD 03/27/24

## 2024-03-27 ENCOUNTER — OFFICE VISIT (OUTPATIENT)
Dept: PSYCHIATRY | Facility: CLINIC | Age: 64
End: 2024-03-27
Payer: MEDICARE

## 2024-03-27 VITALS
WEIGHT: 172 LBS | DIASTOLIC BLOOD PRESSURE: 79 MMHG | SYSTOLIC BLOOD PRESSURE: 129 MMHG | HEART RATE: 78 BPM | HEIGHT: 63 IN | BODY MASS INDEX: 30.48 KG/M2

## 2024-03-27 DIAGNOSIS — F41.1 GAD (GENERALIZED ANXIETY DISORDER): Chronic | ICD-10-CM

## 2024-03-27 DIAGNOSIS — F33.2 MAJOR DEPRESSIVE DISORDER, RECURRENT SEVERE WITHOUT PSYCHOTIC FEATURES (HCC): Primary | Chronic | ICD-10-CM

## 2024-03-27 PROCEDURE — 90833 PSYTX W PT W E/M 30 MIN: CPT | Performed by: PSYCHIATRY & NEUROLOGY

## 2024-03-27 PROCEDURE — 99214 OFFICE O/P EST MOD 30 MIN: CPT | Performed by: PSYCHIATRY & NEUROLOGY

## 2024-03-27 PROCEDURE — G2211 COMPLEX E/M VISIT ADD ON: HCPCS | Performed by: PSYCHIATRY & NEUROLOGY

## 2024-03-27 RX ORDER — BUPROPION HYDROCHLORIDE 150 MG/1
150 TABLET ORAL DAILY
Qty: 90 TABLET | Refills: 1 | Status: SHIPPED | OUTPATIENT
Start: 2024-03-27 | End: 2024-09-23

## 2024-03-27 RX ORDER — ALPRAZOLAM 0.5 MG/1
0.5 TABLET ORAL 3 TIMES DAILY PRN
Qty: 90 TABLET | Refills: 4 | Status: SHIPPED | OUTPATIENT
Start: 2024-04-04 | End: 2024-09-01

## 2024-03-27 NOTE — BH CRISIS PLAN
Client Name: Latosha Rahman       Client YOB: 1960    [place] Safety Plan      Creation Date: 3/27/24 Update Date: 3/27/24   Created By: Regina Hernandez MD Last Updated By: Regina Hernandez MD      Step 1: Warning Signs:   Warning Signs   get quiet, become more introverted, need to be left alone            Step 2: Internal Coping Strategies:   Internal Coping Strategies   exercise            Step 3: People and social settings that provide distraction:   Name Contact Information    Genaro Rahman in my cell phone   Brother Chirag Bliss in my cell phone            Step 4: People whom I can ask for help during a crisis:      Name Contact Information    As above people       Step 5: Professionals or agencies I can contact during a crisis:      Clinican/Agency Name Phone Emergency Contact    [facility] [phone]       [facility] Emergency Department Emergency Department Phone Emergency Department Address    [facility] 911         Crisis Phone Numbers:   Suicide Prevention Lifeline: Call or Text  982 Crisis Text Line: Text HOME to 609-887   Please note: Some [facility] do not have a separate number for Child/Adolescent specific crisis. If your county is not listed under Child/Adolescent, please call the adult number for your county      Adult Crisis Numbers: Child/Adolescent Crisis Numbers   [facility]: [phone] [facility]: [phone]   [facility]: [phone] [facility]: [phone]   [facility]: [phone] [place]: [phone]   [facility]: [phone] [place]: [phone]   [facility]: [phone]   [facility]: [phone]   [facility]: [phone]   [place] [facility]: [phone] (daytime) [phone] (after hours, weekends, holidays)      Step 6: Making the environment safer (plan for lethal means safety):   Patient did not identify any lethal methods:  Yes     Optional: What is most important to me and worth living for?   My son Francesco Davis-Cornel Safety Plan. Halle Davis and Jose C Unger. Used with permission of the authors.

## 2024-03-27 NOTE — BH TREATMENT PLAN
"TREATMENT PLAN (Medication Management Only)        [facility]    Name/Date of Birth/MRN:  Latosha Rahman 63 y.o. 1960 MRN: [id number]  Date of Treatment Plan: March 27, 2024  Diagnosis/Diagnoses:   1. Major depressive disorder, recurrent severe without psychotic features (HCC)    2. JOSE ROBERTO (generalized anxiety disorder)      Strengths/Personal Resources for Self-Care: \"I am not willing to give up\".  Area/Areas of need (in own words): \"organization\".  1. Long Term Goal:   improve control of anxiety, improve control of depression  Target Date: 2 months - 5/27/2024  Person/Persons responsible for completion of goal: Latosha  2.  Short Term Objective (s) - How will we reach this goal?:   A.  Provider new recommended medication/dosage changes and/or continue medication(s): add Wellbutrin XL, continue all other medications (Paxil and Xanax).  B.  N/A.  C.  N/A.  Target Date: 2 months - 5/27/2024  Person/Persons Responsible for Completion of Goal: Latosha   Progress Towards Goals: regressed  Treatment Modality: medication management every 2 months, continue psychotherapy with [facility] therapist  Review due 180 days from date of this plan: 6 months - 9/27/2024  Expected length of service: ongoing treatment unless revised  My Physician/PA/NP and I have developed this plan together and I agree to work on the goals and objectives. I understand the treatment goals that were developed for my treatment.  Electronic Signatures: on file (unless signed below)    Regina Hernandez MD 03/27/24  "

## 2024-04-16 PROBLEM — Z91.199 NO-SHOW FOR APPOINTMENT: Status: ACTIVE | Noted: 2024-04-16

## 2024-04-17 ENCOUNTER — TELEPHONE (OUTPATIENT)
Dept: PSYCHIATRY | Facility: CLINIC | Age: 64
End: 2024-04-17

## 2024-04-17 NOTE — TELEPHONE ENCOUNTER
Hello, this letter can be discarded. I spoke with Latosha by phone yesterday and will review the policy with her at our session next week. Thank you!

## 2024-04-17 NOTE — TELEPHONE ENCOUNTER
NO-SHOW LETTER MAILED TO Latosha Rahman.  ADDRESS: [address] [place] [phone]

## 2024-04-22 ENCOUNTER — SOCIAL WORK (OUTPATIENT)
Dept: BEHAVIORAL/MENTAL HEALTH CLINIC | Facility: CLINIC | Age: 64
End: 2024-04-22
Payer: COMMERCIAL

## 2024-04-22 DIAGNOSIS — F41.1 GAD (GENERALIZED ANXIETY DISORDER): Chronic | ICD-10-CM

## 2024-04-22 DIAGNOSIS — F33.2 MAJOR DEPRESSIVE DISORDER, RECURRENT SEVERE WITHOUT PSYCHOTIC FEATURES (HCC): Primary | Chronic | ICD-10-CM

## 2024-04-22 PROCEDURE — 90837 PSYTX W PT 60 MINUTES: CPT | Performed by: SOCIAL WORKER

## 2024-04-22 NOTE — BH TREATMENT PLAN
"Outpatient Behavioral Health Psychotherapy Treatment Plan    Latosha Rahman  1960     Date of Initial Psychotherapy Assessment: 1/4/24   Date of Current Treatment Plan: 04/22/24  Treatment Plan Target Date: 10/19/24  Treatment Plan Expiration Date: 10/19/24    Diagnosis:   1. Major depressive disorder, recurrent severe without psychotic features (HCC)        2. JOSE ROBERTO (generalized anxiety disorder)            Area(s) of Need: Depression, anxiety    Long Term Goal 1 (in the client's own words): \"To become more centered and focused so that I can achieve goals.\" Latosha also reports she would like to ultimately work toward stopping her medications.     Stage of Change: Preparation    Target Date for completion: 10/19/24     Anticipated therapeutic modalities:  Individual therapy, client-centered therapy/motivational interviewing/cognitive behavioral therapy and behavioral activation techniques as appropriate      People identified to complete this goal: Latosha [facility]iahChio [facility]       Objective 1: (identify the means of measuring success in meeting the objective): Process potential feelings of grief/resentment/adjustment relating to raising adult son with autism diagnosis.       Objective 2: (identify the means of measuring success in meeting the objective):  Identify habits to build such as deep breathing, waking up earlier, incorporating socialization into weekly routine with the goal of reducing depressive symptoms from 8/10. Utilize behavioral activation techniques. Latosha reports one habit she would like to build is implementing \"some type of exercise in the morning.\"    Objective 3: (identify the means of measuring success in meeting the objective): Improve sleep hygiene. Consider use of sleep diaries in order to reflect on sleep habits and identify areas for change.    Objective 4: (identify the means of measuring success in meeting the objective): Begin to process strain within relationship, communication " "concerns and explore referral for couples therapy.     Objective 5: (identify the means of measuring success in meeting the objective): Maintain scheduled appointments with psychiatry for medication management and continue medication compliance.        I am currently under the care of a [facility] psychiatric provider: yes    My [facility] psychiatric provider is: Dr. Hernandez    I am currently taking psychiatric medications: Yes, as prescribed    I feel that I will be ready for discharge from mental health care when I reach the following (measurable goal/objective): \"When I am in control, have peace and marysol.\"    For children and adults who have a legal guardian:   Has there been any change to custody orders and/or guardianship status? NA. If yes, attach updated documentation.    I have created my Crisis Plan and have been offered a copy of this plan - completed 3/27/24 with Dr. Hernandez    Behavioral Health Treatment Plan [facility]: Diagnosis and Treatment Plan explained to Latosha Rahman acknowledges an understanding of their diagnosis. Latosha Rahman agrees to this treatment plan.    I have been offered a copy of this Treatment Plan. yes        "

## 2024-04-22 NOTE — PSYCH
"Behavioral Health Psychotherapy Progress Note    Psychotherapy Provided: Individual Psychotherapy     1. Major depressive disorder, recurrent severe without psychotic features (HCC)        2. JOSE ROBERTO (generalized anxiety disorder)            Goals addressed in session: Goal 1     DATA: LCSW met with Latosha Rahman. Session was conducted In-person. Previous sessions cancelled 3/6 due to Latosha's  having a procedure, 3/20 due to LCSW being out of office, 4/16 Latosha did not show for her session. Discussed lapse in sessions. Latosha was able to set an agenda which included re-visiting couples counseling; LCSW provided new patient intake line and recommended calling directly as intake team previously reached out to find her voicemail box was full. She will plan to do so. Latosha Rahman reported an increase in stressors surrounding having to plan to her son's guardianship; they have a court date scheduled tomorrow and are working to establish Waiver services/summer programming/working with [facility]. They are working with an  and she briefly took a call about this during session due to court being tomorrow. She also described stress surrounding her marriage and showed LCSW a brief video she found regarding \"mental load.\" She discussed feeling her  does not recognize the mental load she carries within their relationship and can sometimes be dismissive of the work she does for their household and especially for their son's care. She hopes couples counseling will be scheduled and until then will also utilize individual sessions to review her communication with him and how to cope with marital strain. Specific techniques used in this session were: assertiveness skills training, empathetic listening, validation, and relaxation techniques.    During this session, this clinician used the following therapeutic modalities: Client-centered Therapy, Mindfulness-based Strategies, and Supportive Psychotherapy    Substance " "Abuse was not addressed during this session. If the client is diagnosed with a co-occurring substance use disorder, please indicate any changes in the frequency or amount of use: N/A. Stage of change for addressing substance use diagnoses: No substance use/Not applicable    ASSESSMENT:  Latosha Rahman presents with a Depressed mood. Latosha Rahman was oriented to person, place, time, and situation. Grooming and Hygiene were good  and clothing was casually dressed and appropriate for weather. Ability to perform ADLs has not changed. she was cooperative.      her affect is Normal range and intensity, somewhat constricted, which is congruent, with her mood and the content of the session. The client has made some progress on their goals.    Latosha Rahman presents with a minimal risk of suicide, none risk of self-harm, and none risk of harm to others.    For any risk assessment that surpasses a \"low\" rating, a safety plan must be developed.    A safety plan was indicated: no  If yes, describe in detail: N/A    PLAN: Between sessions, Latosha Rahman will continue to work on planning for her son's needs, communicating openly about any stress/other feelings with her spouse, and follow up regarding scheduling couples therapy. At the next session, the therapist will use Client-centered Therapy, Mindfulness-based Strategies, and Supportive Psychotherapy to address symptoms as they arise. Latosha Rahman will return to outpatient therapy on 5/14/24.     Behavioral Health Treatment Plan and Discharge Planning: Latosha Rahman is aware of and agrees to continue to work on their treatment plan. They have identified and are working toward their discharge goals. yes    Visit start and stop times:    04/22/24  Start Time: 0106  Stop Time: 0201  Total Visit Time: 55 minutes  "

## 2024-05-08 ENCOUNTER — TELEPHONE (OUTPATIENT)
Dept: PSYCHIATRY | Facility: CLINIC | Age: 64
End: 2024-05-08

## 2024-05-08 NOTE — TELEPHONE ENCOUNTER
Patient is calling regarding cancelling an appointment.    Date/Time: 5/14,5/28,6/11,6/25,7/9, & 723/2024 all at 11am    Reason: going to see new therapist    Patient was rescheduled: YES [] NO [x]  If yes, when was Patient reschedule for:     Patient requesting call back to reschedule: YES [] NO [x]

## 2024-05-09 ENCOUNTER — TELEPHONE (OUTPATIENT)
Dept: PSYCHIATRY | Facility: CLINIC | Age: 64
End: 2024-05-09

## 2024-05-09 ENCOUNTER — DOCUMENTATION (OUTPATIENT)
Dept: BEHAVIORAL/MENTAL HEALTH CLINIC | Facility: CLINIC | Age: 64
End: 2024-05-09

## 2024-05-09 DIAGNOSIS — F33.2 MAJOR DEPRESSIVE DISORDER, RECURRENT SEVERE WITHOUT PSYCHOTIC FEATURES (HCC): Primary | Chronic | ICD-10-CM

## 2024-05-09 DIAGNOSIS — F41.1 GAD (GENERALIZED ANXIETY DISORDER): Chronic | ICD-10-CM

## 2024-05-09 NOTE — PROGRESS NOTES
Psychotherapy Discharge Summary    Preferred Name: Latosha Rahman  YOB: 1960    Admission date to psychotherapy: 1/4/24    Referred by: Psychiatry/Self    Presenting Problem: Depression, anxiety- Latosha reported stressors including raising her adult son with autism as well as strain in her marriage    Course of treatment included: individual therapy and referral for couples therapy (seen for intake on 1/4/24 and attended four follow up sessions in total)    Progress/Outcome of Treatment Goals (brief summary of course of treatment): Treatment goals were established to include processing feelings around raising her son, habit-building, improving sleep hygiene. Barriers included death of mother-in-law, continued depressive symptoms, and need for simultaneous couples sessions which unfortunately were not scheduled. Latosha remains on the wait list for couples therapy and was provided with the intake line to call.     Treatment Complications (if any): Barriers noted above, no show 4/16    Treatment Progress: fair    Current [facility] Psychiatric Provider: Dr. Regina Hernandez    Discharge Medications include: Continuing med mgmt and currently prescribed Paxil 40 mg, Wellbutrin  mg, Xanax 0.5 three times daily as needed    Discharge Date: 5/9/24 (discharging from therapy only, continuing medication management)    Discharge Diagnosis:   1. Major depressive disorder, recurrent severe without psychotic features (HCC)        2. JOSE ROBERTO (generalized anxiety disorder)            Criteria for Discharge:  Requested by client who transferred to another outpatient therapy provider    Aftercare recommendations include (include specific referral names and phone numbers, if appropriate): None- continuation of outpatient therapy which Latosha reported she has scheduled elsewhere when calling to cancel future sessions    Prognosis: good

## 2024-05-15 NOTE — TELEPHONE ENCOUNTER
DISCHARGE LETTER for  (certified and regular) placed in outgoing mail on 05/15/24.    Article #:  [id number] [phone]    Address:  [address] [place] [id number]

## 2024-08-26 NOTE — PSYCH
"MEDICATION MANAGEMENT NOTE        [facility]      Name and Date of Birth:  Latosha Rahman 64 y.o. 1960 MRN: [id number]    Insurance: Payor: BLUE CROSS / Plan: MISC BLUE CROSS / Product Type: Blue Fee for Service /     Date of Visit: August 28, 2024    Reason for Visit:   Chief Complaint   Patient presents with    Medication Management    Follow-up       SUBJECTIVE:    Latosha is seen today for a follow up for Major Depressive Disorder and Generalized Anxiety Disorder. She has experienced on and off symptoms since the last visit. She was feeling more depressed recently and more anxious due to issues with school for her son \"I went through a trying time when school told me that my son could not attend school until he is 22\". She is glad that now she can work with [facility] to have her son transition to after school activities. She is also looking for a day program for her son once he finishes school. She states that her depressive symptoms have improved recently and rates her mood at 4 on the scale of 1 to 10 (10 -worst mood, 1 -best mood). She reports that anxiety symptoms are relatively well controlled now and has been working with a new outside therapist to learn coping skills. She states that the relationship with her  has improved slightly after she had one couples session with her therapist.    She denies any suicidal ideation, intent or plan at present; denies any homicidal ideation, intent or plan at present.    She has no auditory hallucinations, denies any visual hallucinations, has no delusional thinking.    She denies any side effects from current psychiatric medications.    HPI ROS Appetite Changes and Sleep:     She reports normal sleep, adequate number of sleep hours (8 hours), normal appetite, recent weight loss (9 lbs) - intentional, normal energy level    Current Rating Scores:     Current PHQ-9   PHQ-2/9 Depression Screening    Little interest " or pleasure in doing things: 1 - several days  Feeling down, depressed, or hopeless: 1 - several days  Trouble falling or staying asleep, or sleeping too much: 1 - several days  Feeling tired or having little energy: 2 - more than half the days  Poor appetite or overeatin - not at all  Feeling bad about yourself - or that you are a failure or have let yourself or your family down: 1 - several days  Trouble concentrating on things, such as reading the newspaper or watching television: 3 - nearly every day  Moving or speaking so slowly that other people could have noticed. Or the opposite - being so fidgety or restless that you have been moving around a lot more than usual: 0 - not at all  Thoughts that you would be better off dead, or of hurting yourself in some way: 0 - not at all  PHQ-9 Score: 9  PHQ-9 Interpretation: Mild depression       Current PHQ-9 score is decreased from 21 at the last visit).    Review Of Systems:      Constitutional recent weight loss (9 lbs)   ENT negative   Cardiovascular negative   Respiratory negative   Gastrointestinal negative   Genitourinary negative   Musculoskeletal negative   Integumentary negative   Neurological negative   Endocrine negative   Other Symptoms none, all other systems are negative       Past Psychiatric History: (unchanged information from previous note copied and updated)    Past Inpatient Psychiatric Treatment:   No history of past inpatient psychiatric admissions.  Past Outpatient Psychiatric Treatment:   In outpatient treatment at [facility] for many years.  Past Suicide Attempts: no  Past Violent Behavior: no  Past Psychiatric Medication Trials: Paxil, Prozac, Zoloft, Effexor, Wellbutrin and Xanax    Traumatic History: (unchanged information from previous note copied and updated)    Abuse: no history of physical or sexual abuse  Other Traumatic Events: none     Past Medical History:    Past Medical History:   Diagnosis Date     Allergic rhinitis     Arthritis     Asthma     Atelectasis     Atopic rhinitis     Eczema     Hyperlipidemia     Low back pain     MVP (mitral valve prolapse)     Palpitations     Subconjunctival hemorrhage     Vitamin D deficiency         Past Surgical History:   Procedure Laterality Date    COLONOSCOPY       No Known Allergies    Current Outpatient Medications:    Current Outpatient Medications   Medication Sig Dispense Refill    albuterol (PROVENTIL HFA,VENTOLIN HFA) 90 mcg/act inhaler Inhale 2 puffs every 4 (four) hours as needed      ALPRAZolam (XANAX) 0.5 mg tablet Take 1 tablet (0.5 mg total) by mouth 3 (three) times a day as needed for anxiety 90 tablet 4    buPROPion (Wellbutrin XL) 150 mg 24 hr tablet Take 1 tablet (150 mg total) by mouth daily 90 tablet 2    calcium carbonate (OS-GABE) 1250 (500 Ca) MG tablet Take 1 tablet by mouth daily      Cholecalciferol (VITAMIN D3) 2000 units TABS Take 1 capsule by mouth daily      fexofenadine (ALLEGRA) 180 MG tablet Take 180 mg by mouth daily      fluticasone (FLONASE) 50 mcg/act nasal spray 2 sprays into each nostril daily      hydrocortisone 2.5 % cream Apply topically 2 (two) times a day as needed      hydroquinone 4 % cream Apply topically 2 (two) times a day      multivitamin (THERAGRAN) TABS Take 1 tablet by mouth daily      PARoxetine (PAXIL) 40 MG tablet Take 1 tablet (40 mg total) by mouth every evening 90 tablet 2    verapamil (VERELAN PM) 180 MG 24 hr capsule Take 1 capsule by mouth once a week      amoxicillin (AMOXIL) 500 mg capsule Take 2,000 mg by mouth as needed 1 hour prior to dental appointment (Patient not taking: Reported on 6/17/2023)       No current facility-administered medications for this visit.       Substance Abuse History:    Social History     Substance and Sexual Activity   Alcohol Use Yes    Comment: Social alcohol use     Social History     Substance and Sexual Activity   Drug Use No       Social History:    Social History      Socioeconomic History    Marital status: /Civil Union     Spouse name: Not on file    Number of children: 1    Years of education: bachelor's degree     Highest education level: Bachelor's degree (e.g., BA, AB, BS)   Occupational History    Occupation: On disability; worked in office management   Tobacco Use    Smoking status: Never    Smokeless tobacco: Never   Vaping Use    Vaping status: Never Used   Substance and Sexual Activity    Alcohol use: Yes     Comment: Social alcohol use    Drug use: No    Sexual activity: Yes     Partners: Male   Other Topics Concern    Not on file   Social History Narrative    Education: bachelor's degree in communications    Learning Disabilities: none    Marital History:     Children: 1 adoptive son     Living Arrangement: lives in home with  and son    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, on permanent disability    Functioning Relationships:  is supportive    Legal History: none     History: None     Social Determinants of Health     Financial Resource Strain: Low Risk  (8/28/2024)    Overall Financial Resource Strain (CARDIA)     Difficulty of Paying Living Expenses: Not hard at all   Food Insecurity: No Food Insecurity (8/28/2024)    Hunger Vital Sign     Worried About Running Out of Food in the Last Year: Never true     Ran Out of Food in the Last Year: Never true   Transportation Needs: No Transportation Needs (8/28/2024)    PRAPARE - Transportation     Lack of Transportation (Medical): No     Lack of Transportation (Non-Medical): No   Physical Activity: Sufficiently Active (8/28/2024)    Exercise Vital Sign     Days of Exercise per Week: 5 days     Minutes of Exercise per Session: 50 min   Stress: Stress Concern Present (8/28/2024)    [employer] - Occupational Stress Questionnaire     Feeling of Stress : To some extent   Social Connections: Moderately Isolated (8/28/2024)    Social  "Connection and Isolation Panel [NHANES]     Frequency of Communication with Friends and Family: Never     Frequency of Social Gatherings with Friends and Family: Never     Attends Rastafarian Services: More than 4 times per year     Active Member of Clubs or Organizations: No     Attends Club or Organization Meetings: Never     Marital Status:    Intimate Partner Violence: Not At Risk (8/28/2024)    Humiliation, Afraid, Rape, and Kick questionnaire     Fear of Current or Ex-Partner: No     Emotionally Abused: No     Physically Abused: No     Sexually Abused: No   Housing Stability: Low Risk  (8/28/2024)    Housing Stability Vital Sign     Unable to Pay for Housing in the Last Year: No     Number of Times Moved in the Last Year: 0     Homeless in the Last Year: No       Family Psychiatric History:     Family History   Problem Relation Age of Onset    Depression Maternal Grandmother     Bipolar disorder Paternal Aunt     Substance Abuse Neg Hx     Suicide Attempts Neg Hx        History Review: The following portions of the patient's history were reviewed and updated as appropriate: allergies, current medications, past family history, past medical history, past social history, past surgical history, and problem list.         OBJECTIVE:     Vital signs in last 24 hours:    Vitals:    08/28/24 1529   BP: 104/67   Pulse: 93   Weight: 73.9 kg (163 lb)   Height: 5' 3\" (1.6 m)       Mental Status Evaluation:    Appearance age appropriate, casually dressed   Behavior cooperative, mildly anxious   Speech normal rate, normal volume, normal pitch   Mood mildly anxious, mildly depressed   Affect constricted   Thought Processes organized, goal directed   Associations intact associations   Thought Content no overt delusions   Perceptual Disturbances: no auditory hallucinations, no visual hallucinations   Abnormal Thoughts  Risk Potential Suicidal ideation - None  Homicidal ideation - None  Potential for aggression - No "   Orientation oriented to person, place, time/date, and situation   Memory recent and remote memory grossly intact   Consciousness alert and awake   Attention Span Concentration Span attention span and concentration appear shorter than expected for age   Intellect appears to be of average intelligence   Insight intact   Judgement intact   Muscle Strength and  Gait normal muscle strength and normal muscle tone, normal gait and normal balance   Motor activity no abnormal movements   Language no difficulty naming common objects, no difficulty repeating a phrase, no difficulty writing a sentence   Fund of Knowledge adequate knowledge of current events  adequate fund of knowledge regarding past history  adequate fund of knowledge regarding vocabulary    Pain none   Pain Scale 0       Laboratory Results: I have personally reviewed all pertinent laboratory/tests results    LIPID PANEL  Order: [id number]  Component  Ref Range & Units 4/19/22  9:40 AM   Cholesterol  <200 mg/dL 218 High    Triglycerides  <150 mg/dL 64   Cholesterol, HDL, Direct  >40 mg/dL 65   Cholesterol, Non-HDL  <160 mg/dL 153   Comment: Note: For NCEP interpretive guidelines please refer to the Laboratory Handbook.   LDL Cholesterol  <130 mg/dL 140 High    Comment: LDL Cholesterol was calculated using the Friedewald equation. Direct measurement of LDL is not indicated for this patient based on [facility]'s analytical algorithm for measurement of LDL Cholesterol.   Chol/HDL Ratio 3.35     COMPREHENSIVE METABOLIC PANEL  Order: [id number]   Status: Edited Result - FINAL       Next appt: 08/28/2024 at 03:30 PM in Psychiatry (Regina Hernandez MD)                Component  Ref Range & Units 4/19/22  9:40 AM 3/19/21  1:18 PM 7/18/19 11:58 AM 5/25/18  1:50 PM 5/2/18 11:38 AM 5/13/15 11:47 AM 10/23/13  1:15 PM   Glucose  65 - 99 mg/dL 86   77 88 R, CM 85 R, CM 85 R   BUN  7 - 25 mg/dL 8 10 R 15 R 14 13 R 11 R 10 R   Creatinine  0.40 - 1.10 mg/dL 0.91 1.00 R, CM 1.02 R,  CM 0.89 0.84 R, CM 0.79 R, CM 0.79 R   Sodium  135 - 145 mmol/L 144 138 R 141 R 138 139 R 139 140   Potassium  3.5 - 5.2 mmol/L 4.2 3.7 R 4.1 R 3.6 4.3 R 4.7 R 4.3 R   Chloride  100 - 109 mmol/L 108 102 R 103 R 103 104 R 102 R 102 R   Carbon Dioxide  23 - 31 mmol/L 29 32 R 33 High  R 29 29 R 31 R 34 High  R   Calcium  8.5 - 10.1 mg/dL 9.3 9.2 R 9.5 R 9.0 9.4 R 9.1 R 9.6 R   Alkaline Phosphatase  35 - 120 U/L 67 81 R 81 R 71 76 R 74 R 102 R   ALBUMIN  3.5 - 4.8 g/dL 3.7 3.9 R 3.9 R 4.2 4.2 R 3.9 R 4.0 R   Total Bilirubin  0.2 - 1.0 mg/dL 0.2 0.40 R, CM 0.40 R 0.5 0.40 R     Comment: Use of this assay is not recommended for patients undergoing treatment with eltrombopag due to the potential for falsely elevated results.   Protein, Total  6.3 - 8.3 g/dL 6.7 7.6 R 7.7 R 7.3 7.8 R 7.8 R 8.0 R   AST  <41 U/L 12 10 R, CM 15 R, CM 9 16 R, CM 14 R 16 R   ALT  <56 U/L 18 29 R, CM 23 R, CM 16 26 R, CM 20 R, CM 24 R   ANION GAP  3 - 11 7 4 R 5 R 6 6 R 6 R 4 R   eGFRcr  >59 72 71 R 70 R 72 R, CM 89 R     eGFR Comment Interpretive information: calculated GFR                  Suicide/Homicide Risk Assessment:    Risk of Harm to Self:  Demographic risk factors include: age: over 50 or older  Historical Risk Factors include: chronic depressive symptoms, chronic anxiety symptoms  Recent Specific Risk Factors include: diagnosis of depression, current anxiety symptoms  Protective Factors: no current suicidal ideation, being a parent, being , compliant with medications, compliant with mental health treatment, connection to own children, responsibilities and duties to others, stable living environment, supportive family  Weapons: none. The following steps have been taken to ensure weapons are properly secured: not applicable  Based on today's assessment, Latosha presents the following risk of harm to self: low    Risk of Harm to Others:  The following ratings are based on assessment at the time of the interview  Based on today's  assessment, Latosha presents the following risk of harm to others: none    The following interventions are recommended: no intervention changes needed    Assessment/Plan:       Diagnoses and all orders for this visit:    Major depressive disorder, recurrent severe without psychotic features (HCC)  -     PARoxetine (PAXIL) 40 MG tablet; Take 1 tablet (40 mg total) by mouth every evening  -     buPROPion (Wellbutrin XL) 150 mg 24 hr tablet; Take 1 tablet (150 mg total) by mouth daily    JOSE ROBERTO (generalized anxiety disorder)  -     ALPRAZolam (XANAX) 0.5 mg tablet; Take 1 tablet (0.5 mg total) by mouth 3 (three) times a day as needed for anxiety  -     PARoxetine (PAXIL) 40 MG tablet; Take 1 tablet (40 mg total) by mouth every evening          Treatment Recommendations/Precautions:    Continue Paxil 40 mg every evening to control depressive symptoms  Continue Wellbutrin  mg daily also to control depressive symptoms  Continue Xanax 0.5 mg three times a day as needed to control anxiety symptoms  Medication management every 3 months  Continue psychotherapy with own therapist  Follows with family physician for yearly physical exam, asthma, and hyperlipidemia  Aware of 24 hour and weekend coverage for urgent situations accessed by calling [facility] main practice number  I am scheduling this patient out for greater than 3 months: No    Medications Risks/Benefits      Risks, Benefits And Possible Side Effects Of Medications:    Risks, benefits, and possible side effects of medications explained to Latosha including risk of suicidality and serotonin syndrome related to treatment with antidepressants and risks of misuse, abuse or dependence, sedation and respiratory depression related to treatment with benzodiazepine medications. She verbalizes understanding and agreement for treatment.    Controlled Medication Discussion:     Latosha has been filling controlled prescriptions on time as prescribed according  [facility] Prescription Drug Monitoring Program  Discussed with Ltaosha the risks of sedation, respiratory depression, impairment of ability to drive and potential for abuse and addiction related to treatment with benzodiazepine medications. She understands risk of treatment with benzodiazepine medications, agrees to not drive if feels impaired and agrees to take medications as prescribed    Psychotherapy Provided:     Individual psychotherapy provided: Yes  Counseling was provided during the session today for 14 minutes.  Medications, treatment progress and treatment plan reviewed with Latosha.  Goals discussed during in session: improve control of anxiety and maintain improvement in depression.   Discussed with Latosha coping with family issues, marital problems, son's illness, and chronic anxiety.   Coping mechanisms including taking walks, talking to a therapist, and working with [facility] to help her son  reviewed with Latosha.   Supportive therapy provided.      Treatment Plan:    Completed and signed during the session: Not applicable - Treatment Plan not due at this session    Note Share:    This note was shared with patient.    Visit Time    Visit Start Time: 3:27 PM  Visit Stop Time: 4:00 PM  Total Visit Duration:  33 minutes    Regina Hernandez MD 08/28/24

## 2024-08-28 ENCOUNTER — OFFICE VISIT (OUTPATIENT)
Dept: PSYCHIATRY | Facility: CLINIC | Age: 64
End: 2024-08-28
Payer: COMMERCIAL

## 2024-08-28 VITALS
HEIGHT: 63 IN | BODY MASS INDEX: 28.88 KG/M2 | DIASTOLIC BLOOD PRESSURE: 67 MMHG | SYSTOLIC BLOOD PRESSURE: 104 MMHG | WEIGHT: 163 LBS | HEART RATE: 93 BPM

## 2024-08-28 DIAGNOSIS — F33.2 MAJOR DEPRESSIVE DISORDER, RECURRENT SEVERE WITHOUT PSYCHOTIC FEATURES (HCC): Primary | Chronic | ICD-10-CM

## 2024-08-28 DIAGNOSIS — F41.1 GAD (GENERALIZED ANXIETY DISORDER): Chronic | ICD-10-CM

## 2024-08-28 PROCEDURE — 99214 OFFICE O/P EST MOD 30 MIN: CPT | Performed by: PSYCHIATRY & NEUROLOGY

## 2024-08-28 PROCEDURE — 90833 PSYTX W PT W E/M 30 MIN: CPT | Performed by: PSYCHIATRY & NEUROLOGY

## 2024-08-28 RX ORDER — ALPRAZOLAM 0.5 MG
0.5 TABLET ORAL 3 TIMES DAILY PRN
Qty: 90 TABLET | Refills: 4 | Status: SHIPPED | OUTPATIENT
Start: 2024-08-28 | End: 2025-01-25

## 2024-08-28 RX ORDER — BUPROPION HYDROCHLORIDE 150 MG/1
150 TABLET ORAL DAILY
Qty: 90 TABLET | Refills: 2 | Status: SHIPPED | OUTPATIENT
Start: 2024-08-28 | End: 2025-05-25

## 2024-08-28 RX ORDER — PAROXETINE 40 MG/1
40 TABLET, FILM COATED ORAL EVERY EVENING
Qty: 90 TABLET | Refills: 2 | Status: SHIPPED | OUTPATIENT
Start: 2024-08-28 | End: 2025-05-25

## 2024-10-07 NOTE — PSYCH
"MEDICATION MANAGEMENT NOTE        [facility] - PS[facility]CHIATRIC ASSOCIATES      Name and Date of Birth:  Latosha Rahman 64 y.o. 1960 MRN: [id number]    Insurance: Payor: MEDICARE / Plan: MEDICARE A AND B / Product Type: Medicare A & B Fee for Service /     Date of Visit: October 16, 2024    Reason for Visit:   Chief Complaint   Patient presents with    Medication Management    Follow-up     Assessment & Plan  Major depressive disorder, recurrent severe without psychotic features (HCC)  Continues to experience on and off depressive symptoms  Continue Paxil 40 mg daily to control depressive symptoms and anxiety symptoms  Continue Wellbutrin  mg daily also to control depressive symptoms - she admitted that she was taking Wellbutrin XL \"only once in a while\". Encouraged to take Wellbutrin XL regularly       JOSE ROBERTO (generalized anxiety disorder)  Still has anxiety symptoms  Continue Xanax 0.5 mg three times a day as needed to control anxiety symptoms  Continue Paxil 40 mg daily to control depressive symptoms and anxiety symptoms       Treatment Recommendations/Precautions:    Follows with family physician for yearly physical exam, asthma, and hyperlipidemia  Aware of 24 hour and weekend coverage for urgent situations accessed by calling [facility] main practice number  Medication management every 3 months  Continue psychotherapy with own therapist  I am scheduling this patient out for greater than 3 months: No    Medications Risks/Benefits:      Risks, Benefits And Possible Side Effects Of Medications:    Risks, benefits, and possible side effects of medications explained to Latosha including risk of suicidality and serotonin syndrome related to treatment with antidepressants and risks of misuse, abuse or dependence, sedation and respiratory depression related to treatment with benzodiazepine medications. She verbalizes understanding and agreement for " "treatment.    Controlled Medication Discussion:     Latosha has been filling controlled prescriptions on time as prescribed according to [place] Prescription Drug Monitoring Program  Discussed with Latosha the risks of sedation, respiratory depression, impairment of ability to drive and potential for abuse and addiction related to treatment with benzodiazepine medications. She understands risk of treatment with benzodiazepine medications, agrees to not drive if feels impaired and agrees to take medications as prescribed    SUBJECTIVE:    Latosha is seen today for a follow up for Major Depressive Disorder and Generalized Anxiety Disorder. She has experienced on and off symptoms since the last visit. She still feels depressed and rates mood as 7 on a scale of 1 (best mood) to 10 (worst mood). She continues to experience significant anxiety symptoms. She is glad that her son's school confirmed that son could stay at school until the age of 22, but still worries about the future for her son \"we are still working on a waiver. It is so much. I am just overwhelmed\".    She denies any suicidal ideation, intent or plan at present; denies any homicidal ideation, intent or plan at present.    She has no auditory hallucinations, denies any visual hallucinations, has no delusional thoughts.    She denies any side effects from current psychiatric medications.    HPI ROS Appetite Changes and Sleep:     She reports normal sleep, adequate number of sleep hours (8 hours), normal appetite, recent weight gain (1 lbs), normal energy level    Current Rating Scores:     Current PHQ-9   PHQ-2/9 Depression Screening    Little interest or pleasure in doing things: 3 - nearly every day  Feeling down, depressed, or hopeless: 1 - several days  Trouble falling or staying asleep, or sleeping too much: 0 - not at all  Feeling tired or having little energy: 1 - several days  Poor appetite or overeatin - not at all  Feeling bad about yourself - " or that you are a failure or have let yourself or your family down: 3 - nearly every day  Trouble concentrating on things, such as reading the newspaper or watching television: 3 - nearly every day  Moving or speaking so slowly that other people could have noticed. Or the opposite - being so fidgety or restless that you have been moving around a lot more than usual: 0 - not at all  Thoughts that you would be better off dead, or of hurting yourself in some way: 0 - not at all  PHQ-9 Score: 11  PHQ-9 Interpretation: Moderate depression       Current PHQ-9 score is slightly increased from 9 at the last visit).    Review Of Systems:      Constitutional recent weight gain (1 lbs)   ENT negative   Cardiovascular negative   Respiratory negative   Gastrointestinal negative   Genitourinary negative   Musculoskeletal neck pain   Integumentary negative   Neurological negative   Endocrine negative   Other Symptoms none, all other systems are negative       Past Psychiatric History: (unchanged information from previous note copied and updated)    Past Inpatient Psychiatric Treatment:   No history of past inpatient psychiatric admissions.  Past Outpatient Psychiatric Treatment:   In outpatient treatment at [facility] for many years.  Past Suicide Attempts: no  Past Violent Behavior: no  Past Psychiatric Medication Trials: Paxil, Prozac, Zoloft, Effexor, Wellbutrin and Xanax    Traumatic History: (unchanged information from previous note copied and updated)    Abuse: no history of physical or sexual abuse  Other Traumatic Events: none     Past Medical History:    Past Medical History:   Diagnosis Date    Allergic rhinitis     Arthritis     Asthma     Atelectasis     Atopic rhinitis     Eczema     Hyperlipidemia     Low back pain     MVP (mitral valve prolapse)     Palpitations     Subconjunctival hemorrhage     Vitamin D deficiency         Past Surgical History:   Procedure Laterality Date    COLONOSCOPY        No Known Allergies    Current Outpatient Medications:    Current Outpatient Medications   Medication Sig Dispense Refill    albuterol (PROVENTIL HFA,VENTOLIN HFA) 90 mcg/act inhaler Inhale 2 puffs every 4 (four) hours as needed      ALPRAZolam (XANAX) 0.5 mg tablet Take 1 tablet (0.5 mg total) by mouth 3 (three) times a day as needed for anxiety 90 tablet 4    buPROPion (Wellbutrin XL) 150 mg 24 hr tablet Take 1 tablet (150 mg total) by mouth daily 90 tablet 2    calcium carbonate (OS-GABE) 1250 (500 Ca) MG tablet Take 1 tablet by mouth daily      Cholecalciferol (VITAMIN D3) 2000 units TABS Take 1 capsule by mouth daily      fexofenadine (ALLEGRA) 180 MG tablet Take 180 mg by mouth daily      fluticasone (FLONASE) 50 mcg/act nasal spray 2 sprays into each nostril daily      hydrocortisone 2.5 % cream Apply topically 2 (two) times a day as needed      hydroquinone 4 % cream Apply topically 2 (two) times a day      multivitamin (THERAGRAN) TABS Take 1 tablet by mouth daily      PARoxetine (PAXIL) 40 MG tablet Take 1 tablet (40 mg total) by mouth every evening 90 tablet 2    verapamil (VERELAN PM) 180 MG 24 hr capsule Take 1 capsule by mouth once a week      amoxicillin (AMOXIL) 500 mg capsule Take 2,000 mg by mouth as needed 1 hour prior to dental appointment (Patient not taking: Reported on 6/17/2023)       No current facility-administered medications for this visit.       Substance Abuse History:    Social History     Substance and Sexual Activity   Alcohol Use Yes    Comment: Social alcohol use     Social History     Substance and Sexual Activity   Drug Use No       Social History:    Social History     Socioeconomic History    Marital status: /Civil Union     Spouse name: Not on file    Number of children: 1    Years of education: bachelor's degree     Highest education level: Bachelor's degree (e.g., BA, AB, BS)   Occupational History    Occupation: On disability; worked in office management   Tobacco  Use    Smoking status: Never    Smokeless tobacco: Never   Vaping Use    Vaping status: Never Used   Substance and Sexual Activity    Alcohol use: Yes     Comment: Social alcohol use    Drug use: No    Sexual activity: Yes     Partners: Male   Other Topics Concern    Not on file   Social History Narrative    Education: bachelor's degree in communications    Learning Disabilities: none    Marital History:     Children: 1 adoptive son     Living Arrangement: lives in home with  and son    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, on permanent disability    Functioning Relationships:  is supportive    Legal History: none     History: None     Social Determinants of Health     Financial Resource Strain: Low Risk  (10/16/2024)    Overall Financial Resource Strain (CARDIA)     Difficulty of Paying Living Expenses: Not hard at all   Food Insecurity: No Food Insecurity (10/16/2024)    Hunger Vital Sign     Worried About Running Out of Food in the Last Year: Never true     Ran Out of Food in the Last Year: Never true   Transportation Needs: No Transportation Needs (10/16/2024)    PRAPARE - Transportation     Lack of Transportation (Medical): No     Lack of Transportation (Non-Medical): No   Physical Activity: Sufficiently Active (10/16/2024)    Exercise Vital Sign     Days of Exercise per Week: 6 days     Minutes of Exercise per Session: 50 min   Stress: Stress Concern Present (10/16/2024)    [employer] [place] [employer] - Occupational Stress Questionnaire     Feeling of Stress : To some extent   Social Connections: Moderately Isolated (10/16/2024)    Social Connection and Isolation Panel [NHANES]     Frequency of Communication with Friends and Family: Never     Frequency of Social Gatherings with Friends and Family: Never     Attends Protestant Services: More than 4 times per year     Active Member of Clubs or Organizations: No     Attends Club or Organization  "Meetings: Never     Marital Status:    Intimate Partner Violence: Not At Risk (10/16/2024)    Humiliation, Afraid, Rape, and Kick questionnaire     Fear of Current or Ex-Partner: No     Emotionally Abused: No     Physically Abused: No     Sexually Abused: No   Housing Stability: Low Risk  (10/16/2024)    Housing Stability Vital Sign     Unable to Pay for Housing in the Last Year: No     Number of Times Moved in the Last Year: 0     Homeless in the Last Year: No       Family Psychiatric History:     Family History   Problem Relation Age of Onset    Depression Maternal Grandmother     Bipolar disorder Paternal Aunt     Substance Abuse Neg Hx     Suicide Attempts Neg Hx        History Review: The following portions of the patient's history were reviewed and updated as appropriate: allergies, current medications, past family history, past medical history, past social history, past surgical history, and problem list.         OBJECTIVE:     Vital signs in last 24 hours:    Vitals:    10/16/24 1533   BP: 128/84   Pulse: 80   Weight: 74.4 kg (164 lb)   Height: 5' 3\" (1.6 m)       Mental Status Evaluation:    Appearance age appropriate, casually dressed   Behavior cooperative, appears anxious, limited eye contact   Speech normal rate, normal volume, normal pitch   Mood depressed, anxious   Affect constricted   Thought Processes organized, goal directed   Associations intact associations   Thought Content no overt delusions   Perceptual Disturbances: no auditory hallucinations, no visual hallucinations   Abnormal Thoughts  Risk Potential Suicidal ideation - None  Homicidal ideation - None  Potential for aggression - No   Orientation oriented to person, place, time/date, and situation   Memory recent and remote memory grossly intact   Consciousness alert and awake   Attention Span Concentration Span attention span and concentration appear shorter than expected for age   Intellect appears to be of average intelligence "   Insight intact   Judgement intact   Muscle Strength and  Gait normal muscle strength and normal muscle tone, normal gait and normal balance   Motor activity no abnormal movements   Language no difficulty naming common objects, no difficulty repeating a phrase, no difficulty writing a sentence   Fund of Knowledge adequate knowledge of current events  adequate fund of knowledge regarding past history  adequate fund of knowledge regarding vocabulary    Pain moderate   Pain Scale 6       Laboratory Results: I have personally reviewed all pertinent laboratory/tests results    LIPID PANEL  Order: [id number]  Component  Ref Range & Units 4/19/22  9:40 AM   Cholesterol  <200 mg/dL 218 High    Triglycerides  <150 mg/dL 64   Cholesterol, HDL, Direct  >40 mg/dL 65   Cholesterol, Non-HDL  <160 mg/dL 153   Comment: Note: For NCEP interpretive guidelines please refer to the Laboratory Handbook.   LDL Cholesterol  <130 mg/dL 140 High    Comment: LDL Cholesterol was calculated using the Friedewald equation. Direct measurement of LDL is not indicated for this patient based on [facility]'s analytical algorithm for measurement of LDL Cholesterol.   Chol/HDL Ratio 3.35     COMPREHENSIVE METABOLIC PANEL  Order: [id number]   Status: Edited Result - FINAL       Next appt: 10/16/2024 at 03:30 PM in Psychiatry (Regina Hernandez MD)                Component  Ref Range & Units 4/19/22  9:40 AM 3/19/21  1:18 PM 7/18/19 11:58 AM 5/25/18  1:50 PM 5/2/18 11:38 AM 5/13/15 11:47 AM 10/23/13  1:15 PM   Glucose  65 - 99 mg/dL 86   77 88 R, CM 85 R, CM 85 R   BUN  7 - 25 mg/dL 8 10 R 15 R 14 13 R 11 R 10 R   Creatinine  0.40 - 1.10 mg/dL 0.91 1.00 R, CM 1.02 R, CM 0.89 0.84 R, CM 0.79 R, CM 0.79 R   Sodium  135 - 145 mmol/L 144 138 R 141 R 138 139 R 139 140   Potassium  3.5 - 5.2 mmol/L 4.2 3.7 R 4.1 R 3.6 4.3 R 4.7 R 4.3 R   Chloride  100 - 109 mmol/L 108 102 R 103 R 103 104 R 102 R 102 R   Carbon Dioxide  23 - 31 mmol/L 29 32 R 33 High  R 29 29 R 31 R 34  High  R   Calcium  8.5 - 10.1 mg/dL 9.3 9.2 R 9.5 R 9.0 9.4 R 9.1 R 9.6 R   Alkaline Phosphatase  35 - 120 U/L 67 81 R 81 R 71 76 R 74 R 102 R   ALBUMIN  3.5 - 4.8 g/dL 3.7 3.9 R 3.9 R 4.2 4.2 R 3.9 R 4.0 R   Total Bilirubin  0.2 - 1.0 mg/dL 0.2 0.40 R, CM 0.40 R 0.5 0.40 R     Comment: Use of this assay is not recommended for patients undergoing treatment with eltrombopag due to the potential for falsely elevated results.   Protein, Total  6.3 - 8.3 g/dL 6.7 7.6 R 7.7 R 7.3 7.8 R 7.8 R 8.0 R   AST  <41 U/L 12 10 R, CM 15 R, CM 9 16 R, CM 14 R 16 R   ALT  <56 U/L 18 29 R, CM 23 R, CM 16 26 R, CM 20 R, CM 24 R   ANION GAP  3 - 11 7 4 R 5 R 6 6 R 6 R 4 R   eGFRcr  >59 72 71 R 70 R 72 R, CM 89 R     eGFR Comment Interpretive information: calculated GFR              Suicide/Homicide Risk Assessment:    Risk of Harm to Self:  Demographic risk factors include: age: over 50 or older  Historical Risk Factors include: chronic depressive symptoms, chronic anxiety symptoms  Recent Specific Risk Factors include: diagnosis of depression, current depressive symptoms, current anxiety symptoms  Protective Factors: no current suicidal ideation, being a parent, being , compliant with medications, compliant with mental health treatment, connection to own children, responsibilities and duties to others, stable living environment, supportive family  Weapons: none. The following steps have been taken to ensure weapons are properly secured: not applicable  Based on today's assessment, Latosha presents the following risk of harm to self: low    Risk of Harm to Others:  The following ratings are based on assessment at the time of the interview  Based on today's assessment, Latosha presents the following risk of harm to others: none    The following interventions are recommended: no intervention changes needed    Psychotherapy Provided:     Individual psychotherapy provided: Yes  Counseling was provided during the session today for 15  minutes.  Medications, treatment progress and treatment plan reviewed with Latosha.  Goals discussed during in session: improve control of anxiety and improve control of depression.   Discussed with Latosha coping with son's illness and chronic anxiety.   Coping strategies including exercising, doreen, reducing time spent worrying, taking walks, and talking to a therapist reviewed with Latosha.   Importance of medication and treatment compliance reviewed with Latosha.  Supportive therapy provided.      Treatment Plan:    Completed and signed during the session: Yes - with Latosha    Note Share:    This note was shared with patient.    Visit Time    Visit Start Time: 3:31 PM  Visit Stop Time: 3:56 PM  Total Visit Duration:  25 minutes    Regina Hernandez MD 10/16/24

## 2024-10-16 ENCOUNTER — OFFICE VISIT (OUTPATIENT)
Dept: PSYCHIATRY | Facility: CLINIC | Age: 64
End: 2024-10-16
Payer: MEDICARE

## 2024-10-16 VITALS
DIASTOLIC BLOOD PRESSURE: 84 MMHG | BODY MASS INDEX: 29.06 KG/M2 | HEART RATE: 80 BPM | WEIGHT: 164 LBS | SYSTOLIC BLOOD PRESSURE: 128 MMHG | HEIGHT: 63 IN

## 2024-10-16 DIAGNOSIS — F41.1 GAD (GENERALIZED ANXIETY DISORDER): Chronic | ICD-10-CM

## 2024-10-16 DIAGNOSIS — F33.2 MAJOR DEPRESSIVE DISORDER, RECURRENT SEVERE WITHOUT PSYCHOTIC FEATURES (HCC): Primary | Chronic | ICD-10-CM

## 2024-10-16 PROCEDURE — 99214 OFFICE O/P EST MOD 30 MIN: CPT | Performed by: PSYCHIATRY & NEUROLOGY

## 2024-10-16 PROCEDURE — 90833 PSYTX W PT W E/M 30 MIN: CPT | Performed by: PSYCHIATRY & NEUROLOGY

## 2024-10-16 PROCEDURE — G2211 COMPLEX E/M VISIT ADD ON: HCPCS | Performed by: PSYCHIATRY & NEUROLOGY

## 2024-10-16 NOTE — ASSESSMENT & PLAN NOTE
Still has anxiety symptoms  Continue Xanax 0.5 mg three times a day as needed to control anxiety symptoms  Continue Paxil 40 mg daily to control depressive symptoms and anxiety symptoms

## 2024-10-16 NOTE — BH TREATMENT PLAN
"TREATMENT PLAN (Medication Management Only)        [facility] - PSYCHIATRIC [facility]IATES    Name/Date of Birth/MRN:  Latosha Rahman 64 y.o. 1960 MRN: [id number]  Date of Treatment Plan: October 16, 2024  Diagnosis/Diagnoses:   1. Major depressive disorder, recurrent severe without psychotic features (HCC)    2. JOSE ROBERTO (generalized anxiety disorder)      Strengths/Personal Resources for Self-Care: \"my doreen\".  Area/Areas of need (in own words): \"organization, concentration, friendships\".  1. Long Term Goal:   improve control of anxiety, improve control of depression, help with concentration  Target Date: 3 months - 1/16/2025  Person/Persons responsible for completion of goal: Latosha  2.  Short Term Objective (s) - How will we reach this goal?:   A.  Provider new recommended medication/dosage changes and/or continue medication(s): continue current medications as prescribed (Paxil, Wellbutrin XL, and Xanax).  B.  N/A.  C.  N/A.  Target Date: 3 months - 1/16/2025  Person/Persons Responsible for Completion of Goal: Latosha   Progress Towards Goals: limited progress  Treatment Modality: medication management every 3 months, continue psychotherapy with own therapist  Review due 180 days from date of this plan: 6 months - 4/16/2025  Expected length of service: ongoing treatment unless revised  My Physician/PA/NP and I have developed this plan together and I agree to work on the goals and objectives. I understand the treatment goals that were developed for my treatment.  Electronic Signatures: on file (unless signed below)    Regina Hernandez MD 10/16/24  "

## 2024-10-16 NOTE — ASSESSMENT & PLAN NOTE
"Continues to experience on and off depressive symptoms  Continue Paxil 40 mg daily to control depressive symptoms and anxiety symptoms  Continue Wellbutrin  mg daily also to control depressive symptoms - she admitted that she was taking Wellbutrin XL \"only once in a while\". Encouraged to take Wellbutrin XL regularly       "

## 2024-11-06 LAB
25(OH)D3+25(OH)D2 SERPL-MCNC: 33 NG/ML (ref 30–100)
ALBUMIN SERPL-MCNC: 4.3 G/DL (ref 3.5–5.7)
ALP SERPL-CCNC: 57 U/L (ref 35–120)
ALT SERPL-CCNC: 11 U/L
ANION GAP SERPL CALCULATED.3IONS-SCNC: 7 MMOL/L (ref 3–11)
AST SERPL-CCNC: 13 U/L
BASOPHILS # BLD AUTO: 0 THOU/CMM (ref 0–0.1)
BASOPHILS NFR BLD AUTO: 1 %
BILIRUB SERPL-MCNC: 0.6 MG/DL (ref 0.2–1)
BUN SERPL-MCNC: 13 MG/DL (ref 7–25)
CALCIUM SERPL-MCNC: 9.7 MG/DL (ref 8.5–10.5)
CHLORIDE SERPL-SCNC: 101 MMOL/L (ref 100–109)
CHOLEST SERPL-MCNC: 220 MG/DL
CHOLEST/HDLC SERPL: 3.6 {RATIO}
CO2 SERPL-SCNC: 32 MMOL/L (ref 21–31)
CREAT SERPL-MCNC: 0.94 MG/DL (ref 0.4–1.1)
CYTOLOGY CMNT CVX/VAG CYTO-IMP: ABNORMAL
DIFFERENTIAL METHOD BLD: NORMAL
EOSINOPHIL # BLD AUTO: 0.1 THOU/CMM (ref 0–0.5)
EOSINOPHIL NFR BLD AUTO: 1 %
ERYTHROCYTE [DISTWIDTH] IN BLOOD BY AUTOMATED COUNT: 14.1 % (ref 12–16)
FOLATE SERPL-MCNC: 11.5 NG/ML
GFR/BSA.PRED SERPLBLD CYS-BASED-ARV: 68 ML/MIN/{1.73_M2}
GLUCOSE SERPL-MCNC: 77 MG/DL (ref 65–99)
HCT VFR BLD AUTO: 37.2 % (ref 35–43)
HDLC SERPL-MCNC: 61 MG/DL (ref 23–92)
HGB BLD-MCNC: 12.8 G/DL (ref 11.5–14.5)
LDLC SERPL CALC-MCNC: 144 MG/DL
LYMPHOCYTES # BLD AUTO: 1.7 THOU/CMM (ref 1–3)
LYMPHOCYTES NFR BLD AUTO: 34 %
MCH RBC QN AUTO: 28 PG (ref 26–34)
MCHC RBC AUTO-ENTMCNC: 34.2 G/DL (ref 32–37)
MCV RBC AUTO: 82 FL (ref 80–100)
MONOCYTES # BLD AUTO: 0.4 THOU/CMM (ref 0.3–1)
MONOCYTES NFR BLD AUTO: 9 %
NEUTROPHILS # BLD AUTO: 2.7 THOU/CMM (ref 1.8–7.8)
NEUTROPHILS NFR BLD AUTO: 55 %
NONHDLC SERPL-MCNC: 159 MG/DL
PLATELET # BLD AUTO: 228 THOU/CMM (ref 140–350)
PMV BLD REES-ECKER: 8.4 FL (ref 7.5–11.3)
POTASSIUM SERPL-SCNC: 4.3 MMOL/L (ref 3.5–5.2)
PROT SERPL-MCNC: 7 G/DL (ref 6.3–8.3)
RBC # BLD AUTO: 4.56 MILL/CMM (ref 3.7–4.7)
SODIUM SERPL-SCNC: 140 MMOL/L (ref 135–145)
TRIGL SERPL-MCNC: 75 MG/DL
TSH SERPL-ACNC: 1.52 UIU/ML (ref 0.45–5.33)
VIT B12 SERPL-MCNC: 698 PG/ML (ref 180–914)
WBC # BLD AUTO: 5 THOU/CMM (ref 4–10)

## 2025-01-07 NOTE — PSYCH
MEDICATION MANAGEMENT NOTE        [facility] - PSYCHIATRIC ASSOCIATES      Name and Date of Birth:  Latosha Rahman 64 y.o. 1960 MRN: [id number]    Insurance: Payor: MEDICARE / Plan: MEDICARE A AND B / Product Type: Medicare A & B Fee for Service /     Date of Visit: January 16, 2025    Reason for Visit:   Chief Complaint   Patient presents with    Medication Management    Follow-up     Assessment & Plan  Major depressive disorder, recurrent severe without psychotic features (HCC)  Latosha continues to report depressive symptoms    Continue Paxil 40 mg daily to control depressive symptoms  Continue Wellbutrin  mg daily also to control depressive symptoms. Latosha admitted again to taking Wellbutrin XL only occasionally. After education on importance of compliance she agreed to take Wellbutrin XL on daily basis. Will continue current Wellbutrin XL dose without increase       JOSE ROBERTO (generalized anxiety disorder)  Still has anxiety symptoms    Continue Xanax 0.5 mg three times a day as needed to control anxiety symptoms  Orders:    ALPRAZolam (XANAX) 0.5 mg tablet; Take 1 tablet (0.5 mg total) by mouth 3 (three) times a day as needed for anxiety       Treatment Recommendations/Precautions:    Follows with family physician for yearly physical exam, asthma, and hyperlipidemia  Aware of 24 hour and weekend coverage for urgent situations accessed by calling [facility] main practice number  Medication management every 2 to 3 months  Continue psychotherapy with own therapist  Crisis Plan update was completed during the session and updated Crisis Plan Note was provided to the patient  I am scheduling this patient out for greater than 3 months: No    Medications Risks/Benefits:      Risks, Benefits And Possible Side Effects Of Medications:    Risks, benefits, and possible side effects of medications explained to Latosha including risk of suicidality and serotonin syndrome  "related to treatment with antidepressants and risks of misuse, abuse or dependence, sedation and respiratory depression related to treatment with benzodiazepine medications. She verbalizes understanding and agreement for treatment.    Controlled Medication Discussion:     Latosha has been filling controlled prescriptions on time as prescribed according to [place] Prescription Drug Monitoring Program  Discussed with Latosha the risks of sedation, respiratory depression, impairment of ability to drive and potential for abuse and addiction related to treatment with benzodiazepine medications. She understands risk of treatment with benzodiazepine medications, agrees to not drive if feels impaired and agrees to take medications as prescribed    SUBJECTIVE:    Latosha is seen today for a follow up for Major Depressive Disorder and Generalized Anxiety Disorder. She continues to experience ongoing symptoms since the last visit. She still feels depressed and still rates mood as 7 on a scale of 1 (best mood) to 10 (worst mood). She continues to feel anxious often and overwhelmed with issues at home \"trying to keep everything together\". She worries that her son will be graduating from high school in June and is not sure what is going to happen with him \"there are not too many services to help him with getting work. He needs a lot of support to stay on track. I am still helping him with toileting and getting everywhere on time\". She is concerned that placing son in a group home would prevent him from going to places that he is used to going, but at the same time feels overwhelmed with daily chores and taking care of her son  \"I feel like a maid\".    She denies any suicidal ideation, intent or plan at present; denies any homicidal ideation, intent or plan at present.    She has no auditory hallucinations, denies any visual hallucinations, has no delusional thoughts.    She denies any side effects from current psychiatric " "medications. She admitted that again she has been taking Wellbutrin XL only occasionally \"I though it could damage my organs\". She denies any actual side effects from Wellbutrin XL and after education agrees to take Wellbutrin XL on daily basis.    HPI ROS Appetite Changes and Sleep:     She reports increased sleep, increase in number of sleep hours (12 hours), normal appetite, no weight change, low energy    Current Rating Scores:     Current PHQ-9   PHQ-2/9 Depression Screening    Little interest or pleasure in doing things: 3 - nearly every day  Feeling down, depressed, or hopeless: 3 - nearly every day  Trouble falling or staying asleep, or sleeping too much: 3 - nearly every day  Feeling tired or having little energy: 3 - nearly every day  Poor appetite or overeatin - more than half the days  Feeling bad about yourself - or that you are a failure or have let yourself or your family down: 3 - nearly every day  Trouble concentrating on things, such as reading the newspaper or watching television: 3 - nearly every day  Moving or speaking so slowly that other people could have noticed. Or the opposite - being so fidgety or restless that you have been moving around a lot more than usual: 1 - several days  Thoughts that you would be better off dead, or of hurting yourself in some way: 0 - not at all  PHQ-9 Score: 21  PHQ-9 Interpretation: Severe depression       Current PHQ-9 score is increased from 9 at the last visit).    Review Of Systems:      Constitutional low energy   ENT negative   Cardiovascular negative   Respiratory negative   Gastrointestinal negative   Genitourinary negative   Musculoskeletal negative   Integumentary negative   Neurological negative   Endocrine negative   Other Symptoms none, all other systems are negative       Past Psychiatric History: (unchanged information from previous note copied and updated)    Past Inpatient Psychiatric Treatment:   No history of past inpatient psychiatric " admissions.  Past Outpatient Psychiatric Treatment:   In outpatient treatment at [facility] for many years.  Past Suicide Attempts: no  Past Violent Behavior: no  Past Psychiatric Medication Trials: Paxil, Prozac, Zoloft, Effexor, Wellbutrin and Xanax    Traumatic History: (unchanged information from previous note copied and updated)    Abuse: no history of physical or sexual abuse  Other Traumatic Events: none     Past Medical History:    Past Medical History:   Diagnosis Date    Allergic rhinitis     Arthritis     Asthma     Atelectasis     Atopic rhinitis     Depression     Eczema     Hyperlipidemia     Low back pain     MVP (mitral valve prolapse)     Palpitations     Subconjunctival hemorrhage     Vitamin D deficiency         Past Surgical History:   Procedure Laterality Date    COLONOSCOPY       No Known Allergies    Current Outpatient Medications:    Current Outpatient Medications   Medication Sig Dispense Refill    albuterol (PROVENTIL HFA,VENTOLIN HFA) 90 mcg/act inhaler Inhale 2 puffs every 4 (four) hours as needed      ALPRAZolam (XANAX) 0.5 mg tablet Take 1 tablet (0.5 mg total) by mouth 3 (three) times a day as needed for anxiety 90 tablet 4    amoxicillin (AMOXIL) 500 mg capsule Take 2,000 mg by mouth as needed 1 hour prior to dental appointment (Patient not taking: Reported on 6/17/2023)      buPROPion (Wellbutrin XL) 150 mg 24 hr tablet Take 1 tablet (150 mg total) by mouth daily 90 tablet 2    calcium carbonate (OS-GABE) 1250 (500 Ca) MG tablet Take 1 tablet by mouth daily      Cholecalciferol (VITAMIN D3) 2000 units TABS Take 1 capsule by mouth daily      fexofenadine (ALLEGRA) 180 MG tablet Take 180 mg by mouth daily      fluticasone (FLONASE) 50 mcg/act nasal spray 2 sprays into each nostril daily      hydrocortisone 2.5 % cream Apply topically 2 (two) times a day as needed      hydroquinone 4 % cream Apply topically 2 (two) times a day      multivitamin (THERAGRAN) TABS  Take 1 tablet by mouth daily      PARoxetine (PAXIL) 40 MG tablet Take 1 tablet (40 mg total) by mouth every evening 90 tablet 2    verapamil (VERELAN PM) 180 MG 24 hr capsule Take 1 capsule by mouth once a week       No current facility-administered medications for this visit.       Substance Abuse History:    Social History     Substance and Sexual Activity   Alcohol Use Yes    Comment: Social alcohol use     Social History     Substance and Sexual Activity   Drug Use No       Social History:    Social History     Socioeconomic History    Marital status: /Civil Union     Spouse name: Not on file    Number of children: 1    Years of education: bachelor's degree     Highest education level: Bachelor's degree (e.g., BA, AB, BS)   Occupational History    Occupation: On disability; worked in office management   Tobacco Use    Smoking status: Never    Smokeless tobacco: Never   Vaping Use    Vaping status: Never Used   Substance and Sexual Activity    Alcohol use: Yes     Comment: Social alcohol use    Drug use: No    Sexual activity: Yes     Partners: Male   Other Topics Concern    Not on file   Social History Narrative    Education: bachelor's degree in communications    Learning Disabilities: none    Marital History:     Children: 1 adoptive son     Living Arrangement: lives in home with  and son    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, on permanent disability    Functioning Relationships:  is supportive    Legal History: none     History: None     Social Drivers of Health     Financial Resource Strain: Low Risk  (1/16/2025)    Overall Financial Resource Strain (CARDIA)     Difficulty of Paying Living Expenses: Not hard at all   Food Insecurity: No Food Insecurity (1/16/2025)    Hunger Vital Sign     Worried About Running Out of Food in the Last Year: Never true     Ran Out of Food in the Last Year: Never true   Transportation Needs: No Transportation Needs  "(1/16/2025)    PRAPARE - Transportation     Lack of Transportation (Medical): No     Lack of Transportation (Non-Medical): No   Physical Activity: Insufficiently Active (1/16/2025)    Exercise Vital Sign     Days of Exercise per Week: 2 days     Minutes of Exercise per Session: 30 min   Stress: Stress Concern Present (1/16/2025)    [place] [employer] - Occupational Stress Questionnaire     Feeling of Stress : To some extent   Social Connections: Moderately Isolated (1/16/2025)    Social Connection and Isolation Panel [NHANES]     Frequency of Communication with Friends and Family: Never     Frequency of Social Gatherings with Friends and Family: Never     Attends [employer] Services: More than 4 times per year     Active Member of Clubs or Organizations: No     Attends Club or Organization Meetings: Never     Marital Status:    Intimate Partner Violence: Not At Risk (1/16/2025)    Humiliation, Afraid, Rape, and Kick questionnaire     Fear of Current or Ex-Partner: No     Emotionally Abused: No     Physically Abused: No     Sexually Abused: No   Housing Stability: Low Risk  (1/16/2025)    Housing Stability Vital Sign     Unable to Pay for Housing in the Last Year: No     Number of Times Moved in the Last Year: 0     Homeless in the Last Year: No       Family Psychiatric History:     Family History   Problem Relation Age of Onset    Depression Maternal Grandmother     Bipolar disorder Paternal Aunt     Substance Abuse Neg Hx     Suicide Attempts Neg Hx        History Review: The following portions of the patient's history were reviewed and updated as appropriate: allergies, current medications, past family history, past medical history, past social history, past surgical history, and problem list.         OBJECTIVE:     Vital signs in last 24 hours:    Vitals:    01/16/25 1529   BP: 108/73   Pulse: 76   Weight: 74.4 kg (164 lb)   Height: 5' 3\" (1.6 m)       Mental Status " Evaluation:    Appearance age appropriate, casually dressed   Behavior cooperative, appears anxious, limited eye contact   Speech normal rate, normal volume, normal pitch   Mood depressed, anxious   Affect constricted   Thought Processes organized, goal directed   Associations intact associations   Thought Content no overt delusions   Perceptual Disturbances: no auditory hallucinations, no visual hallucinations   Abnormal Thoughts  Risk Potential Suicidal ideation - None  Homicidal ideation - None  Potential for aggression - No   Orientation oriented to person, place, time/date, and situation   Memory recent and remote memory grossly intact   Consciousness alert and awake   Attention Span Concentration Span decreased attention span  decreased concentration   Intellect appears to be of average intelligence   Insight intact   Judgement intact   Muscle Strength and  Gait normal muscle strength and normal muscle tone, normal gait and normal balance   Motor activity no abnormal movements   Language no difficulty naming common objects, no difficulty repeating a phrase, no difficulty writing a sentence   Fund of Knowledge adequate knowledge of current events  adequate fund of knowledge regarding past history  adequate fund of knowledge regarding vocabulary    Pain none   Pain Scale 0       Laboratory Results: I have personally reviewed all pertinent laboratory/tests results    Recent Labs (last 6 months):   Orders Only on 11/06/2024   Component Date Value    Hemoglobin 11/06/2024 12.8     HCT 11/06/2024 37.2     White Blood Cell Count 11/06/2024 5.0     Red Blood Cell Count 11/06/2024 4.56     Platelet Count 11/06/2024 228     SL AMB MPV 11/06/2024 8.4     MCV 11/06/2024 82     MCH 11/06/2024 28.0     MCHC 11/06/2024 34.2     RDW 11/06/2024 14.1     Differential Type 11/06/2024 AUTO     Neutrophils (Absolute) 11/06/2024 2.7     Lymphocytes (Absolute) 11/06/2024 1.7     Monocytes (Absolute) 11/06/2024 0.4     Eosinophils  (Absolute) 11/06/2024 0.1     Basophils ABS 11/06/2024 0.0     Neutrophils 11/06/2024 55     Lymphocytes 11/06/2024 34     Monocytes 11/06/2024 9     Eosinophils 11/06/2024 1     Basophils PCT 11/06/2024 1     TSH 11/06/2024 1.52     25-HYDROXY VIT D 11/06/2024 33     FOLATE, SERUM 11/06/2024 11.5     Vitamin B-12 11/06/2024 698    Orders Only on 11/06/2024   Component Date Value    Glucose, Random 11/06/2024 77     BUN 11/06/2024 13     Creatinine 11/06/2024 0.94     Sodium 11/06/2024 140     Potassium 11/06/2024 4.3     Chloride 11/06/2024 101     CO2 11/06/2024 32 (H)     Calcium 11/06/2024 9.7     Alkaline Phosphatase 11/06/2024 57     Albumin 11/06/2024 4.3     TOTAL BILIRUBIN 11/06/2024 0.6     Protein, Total 11/06/2024 7.0     AST 11/06/2024 13     ALT 11/06/2024 11     ANION GAP 11/06/2024 7     eGFR 11/06/2024 68     Comment 11/06/2024 (Note)     Cholesterol, Total 11/06/2024 220 (H)     Triglycerides 11/06/2024 75     HDL Cholesterol 11/06/2024 61     Non HDL Chol. (LDL+VLDL) 11/06/2024 159     LDL Calculated 11/06/2024 144 (H)     Chol HDLC Ratio 11/06/2024 3.6        Suicide/Homicide Risk Assessment:    Risk of Harm to Self:  Demographic risk factors include: age: over 50 or older  Historical Risk Factors include: chronic depressive symptoms, chronic anxiety symptoms  Recent Specific Risk Factors include: diagnosis of depression, current depressive symptoms, current anxiety symptoms  Protective Factors: no current suicidal ideation, being a parent, being , compliant with medications, connection to own children, responsibilities and duties to others, stable living environment, supportive family  Weapons: none. The following steps have been taken to ensure weapons are properly secured: not applicable  Based on today's assessment, Latosha presents the following risk of harm to self: low    Risk of Harm to Others:  The following ratings are based on assessment at the time of the interview  Based on  today's assessment, Latosha presents the following risk of harm to others: none    The following interventions are recommended: continue medication management, continue psychotherapy    Psychotherapy Provided:     Individual psychotherapy provided: Yes  Counseling was provided during the session today for 20 minutes.  Medications, treatment progress and treatment plan reviewed with Latosha.  Medication education provided to Latosha.  Goals discussed during in session: improve control of anxiety, improve control of depression, and compliance with medications   Discussed with Latosha coping with son's autistic illness and chronic anxiety.   Coping mechanisms including compliance with medications, doreen, relaxation, taking time for self, and starting the process of group home referral for her son  reviewed with Latosha.   Importance of medication and treatment compliance reviewed with Latosha.  Supportive therapy provided.      Treatment Plan:    Completed and signed during the session: Yes - with Latosha    Depression Follow-up Plan Completed: Yes    Note Share:    This note was shared with patient.    Visit Time    Visit Start Time: 3:30 PM  Visit Stop Time: 4:11 PM  Total Visit Duration:  41 minutes    Regina Hernandez MD 01/16/25

## 2025-01-16 ENCOUNTER — OFFICE VISIT (OUTPATIENT)
Dept: PSYCHIATRY | Facility: CLINIC | Age: 65
End: 2025-01-16
Payer: COMMERCIAL

## 2025-01-16 VITALS
HEART RATE: 76 BPM | WEIGHT: 164 LBS | HEIGHT: 63 IN | BODY MASS INDEX: 29.06 KG/M2 | SYSTOLIC BLOOD PRESSURE: 108 MMHG | DIASTOLIC BLOOD PRESSURE: 73 MMHG

## 2025-01-16 DIAGNOSIS — F33.2 MAJOR DEPRESSIVE DISORDER, RECURRENT SEVERE WITHOUT PSYCHOTIC FEATURES (HCC): Primary | Chronic | ICD-10-CM

## 2025-01-16 DIAGNOSIS — F41.1 GAD (GENERALIZED ANXIETY DISORDER): Chronic | ICD-10-CM

## 2025-01-16 PROCEDURE — 90833 PSYTX W PT W E/M 30 MIN: CPT | Performed by: PSYCHIATRY & NEUROLOGY

## 2025-01-16 PROCEDURE — 99214 OFFICE O/P EST MOD 30 MIN: CPT | Performed by: PSYCHIATRY & NEUROLOGY

## 2025-01-16 RX ORDER — ALPRAZOLAM 0.5 MG
0.5 TABLET ORAL 3 TIMES DAILY PRN
Qty: 90 TABLET | Refills: 4 | Status: SHIPPED | OUTPATIENT
Start: 2025-01-16 | End: 2025-06-15

## 2025-01-16 NOTE — ASSESSMENT & PLAN NOTE
Latosha continues to report depressive symptoms    Continue Paxil 40 mg daily to control depressive symptoms  Continue Wellbutrin  mg daily also to control depressive symptoms. Latosha admitted again to taking Wellbutrin XL only occasionally. After education on importance of compliance she agreed to take Wellbutrin XL on daily basis. Will continue current Wellbutrin XL dose without increase

## 2025-01-16 NOTE — BH TREATMENT PLAN
"TREATMENT PLAN (Medication Management Only)        [facility] - PSYCHIATRIC ASSOCIATES    Name/Date of Birth/MRN:  Latosha Rahman 64 y.o. 1960 MRN: [id number]  Date of Treatment Plan: January 16, 2025  Diagnosis/Diagnoses:   1. Major depressive disorder, recurrent severe without psychotic features (HCC)    2. JOSE ROBERTO (generalized anxiety disorder)      Strengths/Personal Resources for Self-Care: \"my doreen\".  Area/Areas of need (in own words): \"concentration, organization\".  1. Long Term Goal:   improve control of anxiety, improve control of depression  Target Date: 3 months - 4/16/2025  Person/Persons responsible for completion of goal: Latosha  2.  Short Term Objective (s) - How will we reach this goal?:   A.  Provider new recommended medication/dosage changes and/or continue medication(s): continue current medications as prescribed (Paxil, Wellbutrin XL, and Xanax).  B.  N/A.  C.  N/A.  Target Date: 3 months - 4/16/2025  Person/Persons Responsible for Completion of Goal: Latosha   Progress Towards Goals: minimal progress  Treatment Modality: medication management every 3 months, continue psychotherapy with own therapist  Review due 180 days from date of this plan: 6 months - 7/16/2025  Expected length of service: ongoing treatment unless revised  My Physician/PA/NP and I have developed this plan together and I agree to work on the goals and objectives. I understand the treatment goals that were developed for my treatment.  Electronic Signatures: on file (unless signed below)    Regina Hernandez MD 01/16/25  "

## 2025-01-16 NOTE — ASSESSMENT & PLAN NOTE
Still has anxiety symptoms    Continue Xanax 0.5 mg three times a day as needed to control anxiety symptoms  Orders:    ALPRAZolam (XANAX) 0.5 mg tablet; Take 1 tablet (0.5 mg total) by mouth 3 (three) times a day as needed for anxiety

## 2025-01-16 NOTE — BH CRISIS PLAN
Client Name: Latosha Rahman       Client YOB: 1960    [place] Safety Plan      Creation Date: 3/27/24 Update Date: 1/16/25   Created By: Regina Hernandez MD Last Updated By: Regina Hernandez MD      Step 1: Warning Signs:   Warning Signs   get quiet, become more introverted, need to be left alone            Step 2: Internal Coping Strategies:   Internal Coping Strategies   exercise            Step 3: People and social settings that provide distraction:   Name Contact Information    Genaro Rahman in my cell phone   Brother Chirag Bliss in my cell phone            Step 4: People whom I can ask for help during a crisis:      Name Contact Information    As above people       Step 5: Professionals or agencies I can contact during a crisis:      Clinican/Agency Name Phone Emergency Contact    [facility] [phone]       [facility] Emergency Department Emergency Department Phone Emergency Department Address    [facility] [address]         Crisis Phone Numbers:   Suicide Prevention Lifeline: Call or Text  986 Crisis Text Line: Text HOME to 304-198   Please note: Some [facility] do not have a separate number for Child/Adolescent specific crisis. If your county is not listed under Child/Adolescent, please call the adult number for your county      Adult Crisis Numbers: Child/Adolescent Crisis Numbers   [facility]: [phone] [facility]: [phone]   [facility]: [phone] [facility]: [phone]   [facility]: [phone] [place]: [phone]   [facility]: [phone] [place]: [phone]   [place]: [phone]   [facility]: [phone]   [facility]: [phone]   [place] [facility]: [phone] (daytime) [phone] (after hours, weekends, holidays)      Step 6: Making the environment safer (plan for lethal means safety):   Patient did not identify any lethal methods:  Yes     Optional: What is most important to me and worth living for?   My son Francesco Davis-Cornel Safety Plan. Halle Davis and Jose C Unger. Used with permission of the authors.

## 2025-04-03 ENCOUNTER — TELEPHONE (OUTPATIENT)
Age: 65
End: 2025-04-03

## 2025-04-03 NOTE — TELEPHONE ENCOUNTER
Patient called in to confirm when their next upcoming medicaition management appointments are scheduled for.    Writer was able to confirm 4/23 @3 in office    And     7/23 @3:30 in office

## 2025-04-22 NOTE — PSYCH
MEDICATION MANAGEMENT NOTE    Name: Latosha Rahman      : 1960      MRN: [id number]  Encounter Provider: Regina Hernandez MD  Encounter Date: 2025   Encounter department: [facility]    Insurance: Payor: MEDICARE / Plan: MEDICARE A AND B / Product Type: Medicare A & B Fee for Service /      Reason for Visit:   Chief Complaint   Patient presents with    Medication Management    Follow-up   :  Assessment & Plan  Major depressive disorder, recurrent severe without psychotic features (HCC)  Slight improvement in depressive symptoms    Continue Paxil 40 mg daily to control depressive symptoms and anxiety symptoms  Continue Wellbutrin  mg daily to control depressive symptoms  Orders:    PARoxetine (PAXIL) 40 MG tablet; Take 1 tablet (40 mg total) by mouth every evening    buPROPion (Wellbutrin XL) 150 mg 24 hr tablet; Take 1 tablet (150 mg total) by mouth daily    JOSE ROBERTO (generalized anxiety disorder)  Still has anxiety symptoms    Continue Paxil 40 mg daily to control depressive symptoms and anxiety symptoms  Continue Xanax 0.5 mg three times a day as needed to control anxiety symptoms  Orders:    PARoxetine (PAXIL) 40 MG tablet; Take 1 tablet (40 mg total) by mouth every evening    ALPRAZolam (XANAX) 0.5 mg tablet; Take 1 tablet (0.5 mg total) by mouth 3 (three) times a day as needed for anxiety Do not start before May 7, 2025.    Treatment Recommendations:    Educated about diagnosis and treatment modalities. Verbalizes understanding and agreement with the treatment plan.  Discussed self monitoring of symptoms, and symptom monitoring tools.  Discussed medications and if treatment adjustment was needed or desired.  Medication management every 3 months  Does not want any medication changes  Follows with family physician for yearly physical exam, asthma, and hyperlipidemia  Aware of 24 hour and weekend coverage for urgent situations accessed by calling [facility]  [facility] main practice number  Continue psychotherapy with own therapist  I am scheduling this patient out for greater than 3 months: No    Medications Risks/Benefits:      Risks, Benefits And Possible Side Effects Of Medications:    Risks, benefits, and possible side effects of medications explained to Latosha including risk of suicidality and serotonin syndrome related to treatment with antidepressants and risks of misuse, abuse or dependence, sedation and respiratory depression related to treatment with benzodiazepine medications. She (or legal representative) verbalizes understanding and agreement for treatment.    Controlled Medication Discussion:     Latosha has been filling controlled prescriptions on time as prescribed according to [place] Prescription Drug Monitoring Program.  Discussed with Latosha the risks of sedation, respiratory depression, impairment of ability to drive and potential for abuse and addiction related to treatment with benzodiazepine medications. She understands risk of treatment with benzodiazepine medications, agrees to not drive if feels impaired and agrees to take medications as prescribed.      History of Present Illness     Latosha is seen today for a follow up for Major Depressive Disorder and Generalized Anxiety Disorder. She has improved somewhat since the last visit. She feels slightly less depressed and rates mood as 6 on a scale of 1 (best mood) to 10 (worst mood). She continues to experience anxiety symptoms. She still worries frequently about her son who is graduating high school \"we have been looking for respite of job ar [employer]. The day program that we visited was not suitable for him\". She started taking regular walks to help her relax and plans to restart psychotherapy (has not seen her therapist recently).    She denies any suicidal ideation, intent or plan at present; denies any homicidal ideation, intent or plan at present.    She has no auditory " hallucinations, denies any visual hallucinations, has no delusional thoughts.    She denies any side effects from current psychiatric medications.    HPI ROS Appetite Changes and Sleep:     She reports increased sleep, increase in number of sleep hours (10 hours), normal appetite, recent weight loss (2 lbs), low energy    Review Of Systems: A comprehensive review of systems was negative except for: shoulder and neck pain    Current Rating Scores:     Current PHQ-9   PHQ-2/9 Depression Screening    Little interest or pleasure in doing things: 3 - nearly every day  Feeling down, depressed, or hopeless: 3 - nearly every day  Trouble falling or staying asleep, or sleeping too much: 3 - nearly every day  Feeling tired or having little energy: 3 - nearly every day  Poor appetite or overeatin - not at all  Feeling bad about yourself - or that you are a failure or have let yourself or your family down: 2 - more than half the days  Trouble concentrating on things, such as reading the newspaper or watching television: 3 - nearly every day  Moving or speaking so slowly that other people could have noticed. Or the opposite - being so fidgety or restless that you have been moving around a lot more than usual: 0 - not at all  Thoughts that you would be better off dead, or of hurting yourself in some way: 0 - not at all  PHQ-9 Score: 17  PHQ-9 Interpretation: Moderately severe depression       Current PHQ-9 score is decreased from 21 at the last visit.    Areas of Improvement: reviewed in HPI/Subjective Section and reviewed in Assessment and Plan Section    Past Psychiatric History: (unchanged information from previous note copied and updated)     Past Inpatient Psychiatric Treatment:   No history of past inpatient psychiatric admissions.  Past Outpatient Psychiatric Treatment:   In outpatient treatment at [facility] for many years.  Past Suicide Attempts: no  Past Violent Behavior: no  Past Psychiatric  Medication Trials: Paxil, Prozac, Zoloft, Effexor, Wellbutrin and Xanax     Traumatic History: (unchanged information from previous note copied and updated)     Abuse: no history of physical or sexual abuse  Other Traumatic Events: none     Past Medical History:   Diagnosis Date    Allergic rhinitis     Arthritis     Asthma     Atelectasis     Atopic rhinitis     Depression     Eczema     Hyperlipidemia     Low back pain     MVP (mitral valve prolapse)     Palpitations     Subconjunctival hemorrhage     Vitamin D deficiency      Past Surgical History:   Procedure Laterality Date    COLONOSCOPY       Allergies: No Known Allergies    Current Outpatient Medications   Medication Instructions    albuterol (PROVENTIL HFA,VENTOLIN HFA) 90 mcg/act inhaler inhale 2 puffs every 6 hours as needed for wheezing    [START ON 5/7/2025] ALPRAZolam (XANAX) 0.5 mg, Oral, 3 times daily PRN    amoxicillin (AMOXIL) 2,000 mg, As needed    buPROPion (WELLBUTRIN XL) 150 mg, Oral, Daily    calcium carbonate (OS-GABE) 1250 (500 Ca) MG tablet 1 tablet, Daily    Cholecalciferol (VITAMIN D3) 2000 units TABS 1 capsule, Daily    fexofenadine (ALLEGRA) 180 mg, Daily    fluticasone (FLONASE) 50 mcg/act nasal spray 2 sprays, Daily    hydrocortisone 2.5 % cream 2 times daily PRN    hydroquinone 4 % cream 2 times daily    multivitamin (THERAGRAN) TABS 1 tablet, Daily    PARoxetine (PAXIL) 40 mg, Oral, Every evening    verapamil (VERELAN PM) 180 MG 24 hr capsule 1 capsule, Weekly        Substance Abuse History:    Tobacco, Alcohol and Drug Use History     Tobacco Use    Smoking status: Never    Smokeless tobacco: Never   Vaping Use    Vaping status: Never Used   Substance Use Topics    Alcohol use: Yes     Comment: Social alcohol use    Drug use: No     Alcohol Use: Not At Risk (4/24/2025)    AUDIT-C     Frequency of Alcohol Consumption: Monthly or less     Average Number of Drinks: 1 or 2     Frequency of Binge Drinking: Never       Social  "History:    Social History     Socioeconomic History    Marital status: /Civil Union     Spouse name: Not on file    Number of children: 1    Years of education: bachelor's degree     Highest education level: Bachelor's degree (e.g., BA, AB, BS)   Occupational History    Occupation: On disability; worked in office management   Other Topics Concern    Not on file   Social History Narrative    Education: bachelor's degree in communications    Learning Disabilities: none    Marital History:     Children: 1 adoptive son     Living Arrangement: lives in home with  and son    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, on permanent disability    Functioning Relationships:  is supportive    Legal History: none     History: None        Family Psychiatric History:     Family History   Problem Relation Age of Onset    Depression Maternal Grandmother     Bipolar disorder Paternal Aunt     Substance Abuse Neg Hx     Suicide Attempts Neg Hx        Medical History Reviewed by provider this encounter:  Tobacco  Allergies  Meds  Problems  Med Hx  Surg Hx  Fam Hx  Soc   Hx         Objective   /81   Pulse 81   Ht 5' 3\" (1.6 m)   Wt 73.5 kg (162 lb)   BMI 28.70 kg/m²      Mental Status Evaluation:    Appearance age appropriate, casually dressed   Behavior cooperative, appears anxious   Speech normal rate, normal volume, normal pitch, spontaneous   Mood anxious, slightly less depressed   Affect constricted   Thought Processes organized, goal directed   Thought Content no overt delusions   Perceptual Disturbances: no auditory hallucinations, no visual hallucinations   Abnormal Thoughts  Risk Potential Suicidal ideation - None  Homicidal ideation - None  Potential for aggression - No   Orientation oriented to person, place, time/date, and situation   Memory recent and remote memory grossly intact   Consciousness alert and awake   Attention Span Concentration Span " attention span and concentration appear shorter than expected for age   Intellect appears to be of average intelligence   Insight intact   Judgement intact   Muscle Strength and  Gait normal muscle strength and normal muscle tone, normal gait and normal balance   Motor activity no abnormal movements   Language no difficulty naming common objects, no difficulty repeating a phrase, no difficulty writing a sentence   Fund of Knowledge adequate knowledge of current events  adequate fund of knowledge regarding past history  adequate fund of knowledge regarding vocabulary        Laboratory Results: I have personally reviewed all pertinent laboratory/tests results    Recent Labs (last 6 months):   Orders Only on 11/06/2024   Component Date Value    Hemoglobin 11/06/2024 12.8     HCT 11/06/2024 37.2     White Blood Cell Count 11/06/2024 5.0     Red Blood Cell Count 11/06/2024 4.56     Platelet Count 11/06/2024 228     SL AMB MPV 11/06/2024 8.4     MCV 11/06/2024 82     MCH 11/06/2024 28.0     MCHC 11/06/2024 34.2     RDW 11/06/2024 14.1     Differential Type 11/06/2024 AUTO     Neutrophils (Absolute) 11/06/2024 2.7     Lymphocytes (Absolute) 11/06/2024 1.7     Monocytes (Absolute) 11/06/2024 0.4     Eosinophils (Absolute) 11/06/2024 0.1     Basophils ABS 11/06/2024 0.0     Neutrophils 11/06/2024 55     Lymphocytes 11/06/2024 34     Monocytes 11/06/2024 9     Eosinophils 11/06/2024 1     Basophils PCT 11/06/2024 1     TSH 11/06/2024 1.52     25-HYDROXY VIT D 11/06/2024 33     FOLATE, SERUM 11/06/2024 11.5     Vitamin B-12 11/06/2024 698    Orders Only on 11/06/2024   Component Date Value    Glucose, Random 11/06/2024 77     BUN 11/06/2024 13     Creatinine 11/06/2024 0.94     Sodium 11/06/2024 140     Potassium 11/06/2024 4.3     Chloride 11/06/2024 101     CO2 11/06/2024 32 (H)     Calcium 11/06/2024 9.7     Alkaline Phosphatase 11/06/2024 57     Albumin 11/06/2024 4.3     TOTAL BILIRUBIN 11/06/2024 0.6     Protein, Total  11/06/2024 7.0     AST 11/06/2024 13     ALT 11/06/2024 11     ANION GAP 11/06/2024 7     eGFR 11/06/2024 68     Comment 11/06/2024 (Note)     Cholesterol, Total 11/06/2024 220 (H)     Triglycerides 11/06/2024 75     HDL Cholesterol 11/06/2024 61     Non HDL Chol. (LDL+VLDL) 11/06/2024 159     LDL Calculated 11/06/2024 144 (H)     Chol HDLC Ratio 11/06/2024 3.6        Suicide/Homicide Risk Assessment:    Risk of Harm to Self:  Demographic Risk Factors include: age: over 50 or older  Historical Risk Factors include: chronic depressive symptoms, chronic anxiety symptoms  Recent Specific Risk Factors include: diagnosis of depression, current depressive symptoms, current anxiety symptoms  Protective Factors: no current suicidal ideation, being a parent, being , compliant with medications, compliant with mental health treatment, connection to own children, responsibilities and duties to others, stable living environment, supportive family  Weapons/Firearms: none. The following steps have been taken to ensure weapons are properly secured: not applicable  Based on today's assessment, Latosha presents the following risk of harm to self: low    Risk of Harm to Others:  The following ratings are based on assessment at the time of the interview  Based on today's assessment, Latosha presents the following risk of harm to others: none    The following interventions are recommended: Continue medication management. Continue psychotherapy. No other intervention changes indicated at this time.    Psychotherapy Provided:     Individual psychotherapy provided: Yes    Counseling was provided during the session today for 17 minutes.  Medications, treatment progress and treatment plan reviewed with Latosha.  Medication education provided to Latosha.  Goals discussed during in session: improve control of anxiety and improve control of depression.  Discussed with Latosha coping with son's illness and ongoing anxiety.  Coping techniques  including exercising, reducing time spent worrying, taking walks, and working with [employer] on finding additional services for her son  reviewed with Latosha.   Supportive therapy provided.     Treatment Plan:    Completed and signed during the session: Yes - with Latosha.    Goals: Progress towards Treatment Plan goals - Yes, moderate progress, as evidenced by subjective findings in HPI/Subjective Section and in Assessment and Plan Section    Depression Follow-up Plan Completed: Yes    Note Share:    This note was shared with patient.    Administrative Statements       Visit Time  Visit Start Time: 3:03  Visit Stop Time: 3:30  Total Visit Duration:  27 minutes        Regina Hernandez MD 04/24/25

## 2025-04-24 ENCOUNTER — OFFICE VISIT (OUTPATIENT)
Dept: PSYCHIATRY | Facility: CLINIC | Age: 65
End: 2025-04-24
Payer: COMMERCIAL

## 2025-04-24 VITALS
WEIGHT: 162 LBS | SYSTOLIC BLOOD PRESSURE: 132 MMHG | BODY MASS INDEX: 28.7 KG/M2 | DIASTOLIC BLOOD PRESSURE: 81 MMHG | HEART RATE: 81 BPM | HEIGHT: 63 IN

## 2025-04-24 DIAGNOSIS — F33.2 MAJOR DEPRESSIVE DISORDER, RECURRENT SEVERE WITHOUT PSYCHOTIC FEATURES (HCC): Primary | Chronic | ICD-10-CM

## 2025-04-24 DIAGNOSIS — F41.1 GAD (GENERALIZED ANXIETY DISORDER): Chronic | ICD-10-CM

## 2025-04-24 PROCEDURE — 99214 OFFICE O/P EST MOD 30 MIN: CPT | Performed by: PSYCHIATRY & NEUROLOGY

## 2025-04-24 PROCEDURE — G2211 COMPLEX E/M VISIT ADD ON: HCPCS | Performed by: PSYCHIATRY & NEUROLOGY

## 2025-04-24 PROCEDURE — 90833 PSYTX W PT W E/M 30 MIN: CPT | Performed by: PSYCHIATRY & NEUROLOGY

## 2025-04-24 RX ORDER — ALBUTEROL SULFATE 90 UG/1
INHALANT RESPIRATORY (INHALATION)
COMMUNITY
Start: 2025-02-04

## 2025-04-24 RX ORDER — PAROXETINE 40 MG/1
40 TABLET, FILM COATED ORAL EVERY EVENING
Qty: 90 TABLET | Refills: 2 | Status: SHIPPED | OUTPATIENT
Start: 2025-04-24 | End: 2026-01-19

## 2025-04-24 RX ORDER — BUPROPION HYDROCHLORIDE 150 MG/1
150 TABLET ORAL DAILY
Qty: 90 TABLET | Refills: 2 | Status: SHIPPED | OUTPATIENT
Start: 2025-04-24 | End: 2026-01-19

## 2025-04-24 RX ORDER — ALPRAZOLAM 0.5 MG
0.5 TABLET ORAL 3 TIMES DAILY PRN
Qty: 90 TABLET | Refills: 4 | Status: SHIPPED | OUTPATIENT
Start: 2025-05-07 | End: 2025-10-04

## 2025-04-24 NOTE — BH TREATMENT PLAN
"TREATMENT PLAN (Medication Management Only)        [facility]    Name/Date of Birth/MRN:  Latosha Rahman 64 y.o. 1960 MRN: [id number]  Date of Treatment Plan: April 24, 2025  Diagnosis/Diagnoses:   1. Major depressive disorder, recurrent severe without psychotic features (HCC)    2. JOSE ROBERTO (generalized anxiety disorder)      Strengths/Personal Resources for Self-Care: \"my doreen\".  Area/Areas of need (in own words): \"focus, exercise, organization\".  1. Long Term Goal:   improve control of anxiety, improve control of depression  Target Date: 3 months - 7/24/2025  Person/Persons responsible for completion of goal: Latosha  2.  Short Term Objective (s) - How will we reach this goal?:   A.  Provider new recommended medication/dosage changes and/or continue medication(s): continue current medications as prescribed (Paxil, Wellbutrin XL, and Xanax).  B.  N/A.  C.  N/A.  Target Date: 3 months - 7/24/2025  Person/Persons Responsible for Completion of Goal: Latosha   Progress Towards Goals: progressing slowly  Treatment Modality: medication management every 3 months, continue psychotherapy with own therapist  Review due 180 days from date of this plan: 6 months - 10/24/2025  Expected length of service: ongoing treatment unless revised  My Physician/PA/NP and I have developed this plan together and I agree to work on the goals and objectives. I understand the treatment goals that were developed for my treatment.  Electronic Signatures: on file (unless signed below)    Regina Hernandez MD 04/24/25  "

## 2025-04-24 NOTE — ASSESSMENT & PLAN NOTE
Still has anxiety symptoms    Continue Paxil 40 mg daily to control depressive symptoms and anxiety symptoms  Continue Xanax 0.5 mg three times a day as needed to control anxiety symptoms  Orders:    PARoxetine (PAXIL) 40 MG tablet; Take 1 tablet (40 mg total) by mouth every evening    ALPRAZolam (XANAX) 0.5 mg tablet; Take 1 tablet (0.5 mg total) by mouth 3 (three) times a day as needed for anxiety Do not start before May 7, 2025.

## 2025-04-24 NOTE — ASSESSMENT & PLAN NOTE
Slight improvement in depressive symptoms    Continue Paxil 40 mg daily to control depressive symptoms and anxiety symptoms  Continue Wellbutrin  mg daily to control depressive symptoms  Orders:    PARoxetine (PAXIL) 40 MG tablet; Take 1 tablet (40 mg total) by mouth every evening    buPROPion (Wellbutrin XL) 150 mg 24 hr tablet; Take 1 tablet (150 mg total) by mouth daily

## 2025-05-19 DIAGNOSIS — F33.2 MAJOR DEPRESSIVE DISORDER, RECURRENT SEVERE WITHOUT PSYCHOTIC FEATURES (HCC): Chronic | ICD-10-CM

## 2025-05-19 DIAGNOSIS — F41.1 GAD (GENERALIZED ANXIETY DISORDER): Chronic | ICD-10-CM

## 2025-05-19 RX ORDER — PAROXETINE 40 MG/1
40 TABLET, FILM COATED ORAL EVERY EVENING
Qty: 90 TABLET | Refills: 1 | Status: SHIPPED | OUTPATIENT
Start: 2025-05-19 | End: 2025-11-15

## 2025-05-19 NOTE — TELEPHONE ENCOUNTER
Reason for call:   [x] Refill   [] Prior Auth  [x] Other: Not a duplicate. Sent to the wrong pharmacy.     Office:   [] PCP/Provider -   [x] Specialty/Provider -     Medication: PARoxetine (PAXIL) 40 MG tablet     Dose/Frequency: Take 1 tablet (40 mg total) by mouth every evening     Quantity: 90    Pharmacy: [facility] [place] [place] [facility]    [facility]   Does the patient have enough for 3 days?   [] Yes   [x] No - Send as HP to POD

## 2025-07-22 NOTE — PSYCH
"MEDICATION MANAGEMENT NOTE    Name: Latosha Rahman      : 1960      MRN: [id number]  Encounter Provider: Regina Hernandez MD  Encounter Date: 2025   Encounter department: [facility]    Insurance: Payor: BLUE CROSS / Plan: MISC BLUE CROSS / Product Type: Blue Fee for Service /      Reason for Visit:   Chief Complaint   Patient presents with    Medication Management    Follow-up   :  Assessment & Plan  Major depressive disorder, recurrent severe without psychotic features (HCC)  On and off depressive symptoms. Latosha does not want any medication adjustment at this time    Continue Paxil 40 mg daily to control depressive symptoms and anxiety symptoms  Continue Wellbutrin  mg daily to control depressive symptoms        JOSE ROBERTO (generalized anxiety disorder)  Continues to experience anxiety symptoms    Continue Paxil 40 mg daily to control depressive symptoms and anxiety symptoms  Continue Xanax 0.5 mg three times a day as needed to control anxiety symptoms        Treatment Recommendations:    Educated about diagnosis and treatment modalities. Verbalizes understanding and agreement with the treatment plan.  Discussed self monitoring of symptoms, and symptom monitoring tools.  Discussed medications and if treatment adjustment was needed or desired.  Medication management every 3 months  Follows with family physician for yearly physical exam, asthma, and hyperlipidemia  Aware of 24 hour and weekend coverage for urgent situations accessed by calling [facility] main practice number  No longer sees a therapist and states that she does not want to restart therapy at this time \"I don't think it was helping\"  I am scheduling this patient out for greater than 3 months: No    Medications Risks/Benefits:      Risks, Benefits And Possible Side Effects Of Medications:    Risks, benefits, and possible side effects of medications explained to Latosha including risk " "of suicidality and serotonin syndrome related to treatment with antidepressants and risks of misuse, abuse or dependence, sedation and respiratory depression related to treatment with benzodiazepine medications. She (or legal representative) verbalizes understanding and agreement for treatment.    Controlled Medication Discussion:     Latosha has been filling controlled prescriptions on time as prescribed according to [place] Prescription Drug Monitoring Program.  Discussed with Latosha the risks of sedation, respiratory depression, impairment of ability to drive and potential for abuse and addiction related to treatment with benzodiazepine medications. She understands risk of treatment with benzodiazepine medications, agrees to not drive if feels impaired and agrees to take medications as prescribed.  Reviewed with Latosha recommendations to taper off benzodiazepine due to prolonged therapy. She is not agreeable at this time for a taper and prefers to continue his current benzodiazepine dosing due to concerns about potential decompensation or recurrence of psychiatric symptoms. She understands the risks of long term treatment with benzodiazepines as specified above.    History of Present Illness     Latosha is seen today for a follow up for Major Depressive Disorder and Generalized Anxiety Disorder. She has experienced on and off symptoms since the last visit. She feels depressed at times and rates mood as 5 on a scale of 1 (best mood) to 10 (worst mood). She still has significant anxiety symptoms. She still worries about her son with autism who is graduating from high school this year. She has been trying to arrange in-home and community services form him \"we are also looking at day program for him\". She sometimes feels overwhelmed with this situation and states that she is experiencing increased shoulder and neck muscle tension because of her stress level. She has been trying to cope with stress by taking walks and " relying on her doreen.    She denies any suicidal ideation, intent or plan at present; denies any homicidal ideation, intent or plan at present.    She has no auditory hallucinations, denies any visual hallucinations, has no delusional thoughts.    She denies any side effects from current psychiatric medications.    HPI ROS Appetite Changes and Sleep:     She reports normal sleep, adequate number of sleep hours (8 hours), normal appetite, recent weight loss (2 lbs), fluctuating energy levels    Review Of Systems: A comprehensive review of systems was negative except for: shoulder pain    Current Rating Scores:     Current PHQ-9   PHQ-2/9 Depression Screening    Little interest or pleasure in doing things: 3 - nearly every day  Feeling down, depressed, or hopeless: 3 - nearly every day  Trouble falling or staying asleep, or sleeping too much: 3 - nearly every day  Feeling tired or having little energy: 3 - nearly every day  Poor appetite or overeatin - not at all  Feeling bad about yourself - or that you are a failure or have let yourself or your family down: 0 - not at all  Trouble concentrating on things, such as reading the newspaper or watching television: 3 - nearly every day  Moving or speaking so slowly that other people could have noticed. Or the opposite - being so fidgety or restless that you have been moving around a lot more than usual: 0 - not at all  Thoughts that you would be better off dead, or of hurting yourself in some way: 0 - not at all  PHQ-9 Score: 15  PHQ-9 Interpretation: Moderately severe depression       Current PHQ-9 score is slightly decreased from 17 at the last visit.    Areas of Improvement: reviewed in HPI/Subjective Section and reviewed in Assessment and Plan Section    Past Psychiatric History: (unchanged information from previous note copied and updated)     Past Inpatient Psychiatric Treatment:   No history of past inpatient psychiatric admissions.  Past Outpatient Psychiatric  Treatment:   In outpatient treatment at [facility] for many years.  Past Suicide Attempts: no  Past Violent Behavior: no  Past Psychiatric Medication Trials: Paxil, Prozac, Zoloft, Effexor, Wellbutrin and Xanax     Traumatic History: (unchanged information from previous note copied and updated)     Abuse: no history of physical or sexual abuse  Other Traumatic Events: none     Past Medical History:   Diagnosis Date    Allergic rhinitis     Arthritis     Asthma     Atelectasis     Atopic rhinitis     Depression     Eczema     Hyperlipidemia     Low back pain     MVP (mitral valve prolapse)     Palpitations     Subconjunctival hemorrhage     Vitamin D deficiency      Past Surgical History:   Procedure Laterality Date    COLONOSCOPY       Allergies: No Known Allergies    Current Outpatient Medications   Medication Instructions    albuterol (PROVENTIL HFA,VENTOLIN HFA) 90 mcg/act inhaler     ALPRAZolam (XANAX) 0.5 mg, Oral, 3 times daily PRN    amoxicillin (AMOXIL) 2,000 mg, As needed    buPROPion (WELLBUTRIN XL) 150 mg, Oral, Daily    calcium carbonate (OS-GABE) 1250 (500 Ca) MG tablet 1 tablet, Daily    Cholecalciferol (VITAMIN D3) 2000 units TABS 1 capsule, Daily    fexofenadine (ALLEGRA) 180 mg, Daily    fluticasone (FLONASE) 50 mcg/act nasal spray 2 sprays, Daily    hydrocortisone 2.5 % cream 2 times daily PRN    hydroquinone 4 % cream 2 times daily    lidocaine (LIDODERM) 5 % 1 patch    multivitamin (THERAGRAN) TABS 1 tablet, Daily    PARoxetine (PAXIL) 40 mg, Oral, Every evening    verapamil (VERELAN PM) 180 MG 24 hr capsule 1 capsule, Weekly        Substance Abuse History:    Tobacco, Alcohol and Drug Use History     Tobacco Use    Smoking status: Never    Smokeless tobacco: Never   Vaping Use    Vaping status: Never Used   Substance Use Topics    Alcohol use: Yes     Comment: Social alcohol use    Drug use: No     Alcohol Use: Not At Risk (7/23/2025)    AUDIT-C     Frequency of Alcohol  "Consumption: Monthly or less     Average Number of Drinks: 1 or 2     Frequency of Binge Drinking: Never       Social History:    Social History     Socioeconomic History    Marital status: /Civil Union     Spouse name: Not on file    Number of children: 1    Years of education: bachelor's degree     Highest education level: Bachelor's degree (e.g., BA, AB, BS)   Occupational History    Occupation: On disability; worked in office management   Other Topics Concern    Not on file   Social History Narrative    Education: bachelor's degree in communications    Learning Disabilities: none    Marital History:     Children: 1 adoptive son     Living Arrangement: lives in home with  and son    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, on permanent disability    Functioning Relationships:  is supportive    Legal History: none     History: None        Family Psychiatric History:     Family History   Problem Relation Name Age of Onset    Depression Maternal Grandmother      Bipolar disorder Paternal Aunt      Substance Abuse Neg Hx      Suicide Attempts Neg Hx         Medical History Reviewed by provider this encounter:  Tobacco  Allergies  Meds  Problems  Med Hx  Surg Hx  Fam Hx  Soc   Hx         Objective   /73   Pulse 81   Ht 5' 3\" (1.6 m)   Wt 72.6 kg (160 lb)   BMI 28.34 kg/m²      Mental Status Evaluation:    Appearance age appropriate, casually dressed   Behavior cooperative, appears anxious, limited eye contact   Speech normal rate, normal volume, normal pitch, spontaneous   Mood depressed, anxious   Affect constricted   Thought Processes organized, goal directed   Thought Content no overt delusions   Perceptual Disturbances: no auditory hallucinations, no visual hallucinations   Abnormal Thoughts  Risk Potential Suicidal ideation - None  Homicidal ideation - None  Potential for aggression - No   Orientation oriented to person, place, time/date, " and situation   Memory recent and remote memory grossly intact   Consciousness alert and awake   Attention Span Concentration Span attention span and concentration appear shorter than expected for age   Intellect appears to be of average intelligence   Insight intact   Judgement intact   Muscle Strength and  Gait normal muscle strength and normal muscle tone, normal gait and normal balance   Motor activity no abnormal movements   Language no difficulty naming common objects, no difficulty repeating a phrase, no difficulty writing a sentence   Fund of Knowledge adequate knowledge of current events  adequate fund of knowledge regarding past history  adequate fund of knowledge regarding vocabulary        Laboratory Results: I have personally reviewed all pertinent laboratory/tests results    Recent Labs (last 12 months):   Orders Only on 11/06/2024   Component Date Value    Hemoglobin 11/06/2024 12.8     HCT 11/06/2024 37.2     White Blood Cell Count 11/06/2024 5.0     Red Blood Cell Count 11/06/2024 4.56     Platelet Count 11/06/2024 228     SL AMB MPV 11/06/2024 8.4     MCV 11/06/2024 82     MCH 11/06/2024 28.0     MCHC 11/06/2024 34.2     RDW 11/06/2024 14.1     Differential Type 11/06/2024 AUTO     Neutrophils (Absolute) 11/06/2024 2.7     Lymphocytes (Absolute) 11/06/2024 1.7     Monocytes (Absolute) 11/06/2024 0.4     Eosinophils (Absolute) 11/06/2024 0.1     Basophils ABS 11/06/2024 0.0     Neutrophils 11/06/2024 55     Lymphocytes 11/06/2024 34     Monocytes 11/06/2024 9     Eosinophils 11/06/2024 1     Basophils PCT 11/06/2024 1     TSH 11/06/2024 1.52     25-HYDROXY VIT D 11/06/2024 33     FOLATE, SERUM 11/06/2024 11.5     Vitamin B-12 11/06/2024 698    Orders Only on 11/06/2024   Component Date Value    Glucose, Random 11/06/2024 77     BUN 11/06/2024 13     Creatinine 11/06/2024 0.94     Sodium 11/06/2024 140     Potassium 11/06/2024 4.3     Chloride 11/06/2024 101     CO2 11/06/2024 32 (H)     Calcium  11/06/2024 9.7     Alkaline Phosphatase 11/06/2024 57     Albumin 11/06/2024 4.3     TOTAL BILIRUBIN 11/06/2024 0.6     Protein, Total 11/06/2024 7.0     AST 11/06/2024 13     ALT 11/06/2024 11     ANION GAP 11/06/2024 7     eGFR 11/06/2024 68     Comment 11/06/2024 (Note)     Cholesterol, Total 11/06/2024 220 (H)     Triglycerides 11/06/2024 75     HDL Cholesterol 11/06/2024 61     Non HDL Chol. (LDL+VLDL) 11/06/2024 159     LDL Calculated 11/06/2024 144 (H)     Chol HDLC Ratio 11/06/2024 3.6        Suicide/Homicide Risk Assessment:    Risk of Harm to Self:  Demographic Risk Factors include: age: over 50 or older  Historical Risk Factors include: chronic depressive symptoms, chronic anxiety symptoms  Recent Specific Risk Factors include: diagnosis of depression, current depressive symptoms, current anxiety symptoms  Protective Factors: no current suicidal ideation, being a parent, being , compliant with medications, compliant with mental health treatment, connection to own children, responsibilities and duties to others, stable living environment, supportive family  Weapons/Firearms: none. The following steps have been taken to ensure weapons are properly secured: not applicable  Based on today's assessment, Latosha presents the following risk of harm to self: low    Risk of Harm to Others:  The following ratings are based on assessment at the time of the interview  Based on today's assessment, Latosha presents the following risk of harm to others: none    The following interventions are recommended: Continue medication management. No other intervention changes indicated at this time.    Psychotherapy Provided:     Individual psychotherapy provided: Yes    Counseling was provided during the session today for 16 minutes.  Medications, treatment progress and treatment plan reviewed with Latosha.  Goals discussed during in session: alleviate anxiety and remission of depression.  Discussed with Latosha coping with  family issues, son's illness, chronic anxiety, and chronic mental illness.  Coping skills including doreen, keeping busy at home, taking time for self, taking walks, and potential benefits of restarting psychotherapy reviewed with Latosha.   Supportive therapy provided.     Treatment Plan:    Completed and signed during the session: Yes - with Latosha.    Goals: Progress towards Treatment Plan goals - Yes, moderate progress, as evidenced by subjective findings in HPI/Subjective Section and in Assessment and Plan Section    Depression Follow-up Plan Completed: Yes    Note Share:    This note was shared with patient.    Administrative Statements       Visit Time  Visit Start Time: 3:36 PM  Visit Stop Time: 3:58 PM  Total Visit Duration: 22 minutes    Regina Hernandez MD 07/23/25

## 2025-07-23 ENCOUNTER — OFFICE VISIT (OUTPATIENT)
Dept: PSYCHIATRY | Facility: CLINIC | Age: 65
End: 2025-07-23
Payer: COMMERCIAL

## 2025-07-23 VITALS
HEART RATE: 81 BPM | DIASTOLIC BLOOD PRESSURE: 73 MMHG | HEIGHT: 63 IN | BODY MASS INDEX: 28.35 KG/M2 | WEIGHT: 160 LBS | SYSTOLIC BLOOD PRESSURE: 112 MMHG

## 2025-07-23 DIAGNOSIS — F33.2 MAJOR DEPRESSIVE DISORDER, RECURRENT SEVERE WITHOUT PSYCHOTIC FEATURES (HCC): Primary | Chronic | ICD-10-CM

## 2025-07-23 DIAGNOSIS — F41.1 GAD (GENERALIZED ANXIETY DISORDER): Chronic | ICD-10-CM

## 2025-07-23 PROCEDURE — 90833 PSYTX W PT W E/M 30 MIN: CPT | Performed by: PSYCHIATRY & NEUROLOGY

## 2025-07-23 PROCEDURE — 99214 OFFICE O/P EST MOD 30 MIN: CPT | Performed by: PSYCHIATRY & NEUROLOGY

## 2025-07-23 RX ORDER — LIDOCAINE 50 MG/G
1 PATCH TOPICAL
COMMUNITY
Start: 2025-05-12

## 2025-07-23 NOTE — ASSESSMENT & PLAN NOTE
On and off depressive symptoms. Latosha does not want any medication adjustment at this time    Continue Paxil 40 mg daily to control depressive symptoms and anxiety symptoms  Continue Wellbutrin  mg daily to control depressive symptoms

## 2025-07-23 NOTE — ASSESSMENT & PLAN NOTE
Continues to experience anxiety symptoms    Continue Paxil 40 mg daily to control depressive symptoms and anxiety symptoms  Continue Xanax 0.5 mg three times a day as needed to control anxiety symptoms

## 2025-07-23 NOTE — BH TREATMENT PLAN
"TREATMENT PLAN (Medication Management Only)        [facility]    Name/Date of Birth/MRN:  Latosha Rahman 64 y.o. 1960 MRN: [id number]  Date of Treatment Plan: July 23, 2025  Diagnosis/Diagnoses:   1. Major depressive disorder, recurrent severe without psychotic features (HCC)    2. JOSE ROBERTO (generalized anxiety disorder)      Strengths/Personal Resources for Self-Care: \"doreen in [employer]\".  Area/Areas of need (in own words): \"organization, focus, exercise\".  1. Long Term Goal:   improve control of anxiety, improve control of depression  Target Date: 3 months - 10/23/2025  Person/Persons responsible for completion of goal: Latosha  2.  Short Term Objective (s) - How will we reach this goal?:   A.  Provider new recommended medication/dosage changes and/or continue medication(s): continue current medications as prescribed (Paxil, Wellbutrin XL, and Xanax).  B.  N/A.  C.  N/A.  Target Date: 3 months - 10/23/2025  Person/Persons Responsible for Completion of Goal: Latosha   Progress Towards Goals: limited progress  Treatment Modality: medication management every 3 months  Review due 180 days from date of this plan: 6 months - 1/23/2026  Expected length of service: ongoing treatment unless revised  My Physician/PA/NP and I have developed this plan together and I agree to work on the goals and objectives. I understand the treatment goals that were developed for my treatment.  Electronic Signatures: on file (unless signed below)    Regina Hernandez MD 07/23/25  "

## 2025-07-29 ENCOUNTER — TELEPHONE (OUTPATIENT)
Dept: PSYCHIATRY | Facility: CLINIC | Age: 65
End: 2025-07-29

## 2025-08-13 ENCOUNTER — TELEPHONE (OUTPATIENT)
Dept: PSYCHIATRY | Facility: CLINIC | Age: 65
End: 2025-08-13